# Patient Record
Sex: MALE | Race: BLACK OR AFRICAN AMERICAN | Employment: OTHER | URBAN - METROPOLITAN AREA
[De-identification: names, ages, dates, MRNs, and addresses within clinical notes are randomized per-mention and may not be internally consistent; named-entity substitution may affect disease eponyms.]

---

## 2017-06-19 PROBLEM — R10.30 LOWER ABDOMINAL PAIN: Status: ACTIVE | Noted: 2017-06-19

## 2017-06-28 PROBLEM — K76.0 FATTY LIVER: Status: ACTIVE | Noted: 2017-06-28

## 2017-08-15 PROBLEM — R11.0 NAUSEA: Status: ACTIVE | Noted: 2017-08-15

## 2017-10-23 PROBLEM — R14.0 ABDOMINAL BLOATING: Status: ACTIVE | Noted: 2017-10-23

## 2017-10-30 ENCOUNTER — HOSPITAL ENCOUNTER (OUTPATIENT)
Dept: LAB | Age: 82
Discharge: HOME | End: 2017-10-30

## 2017-10-30 PROCEDURE — 88305 TISSUE EXAM BY PATHOLOGIST: CPT | Performed by: INTERNAL MEDICINE

## 2017-10-30 PROCEDURE — 88312 SPECIAL STAINS GROUP 1: CPT | Performed by: INTERNAL MEDICINE

## 2017-11-09 ENCOUNTER — HOSPITAL ENCOUNTER (INPATIENT)
Age: 82
LOS: 13 days | Discharge: HOME | DRG: 057 | End: 2017-11-22
Attending: PHYSICAL MEDICINE & REHABILITATION | Admitting: PHYSICAL MEDICINE & REHABILITATION
Payer: MEDICARE

## 2017-11-09 DIAGNOSIS — R10.30 LOWER ABDOMINAL PAIN: ICD-10-CM

## 2017-11-09 DIAGNOSIS — M54.2 NECK PAIN: ICD-10-CM

## 2017-11-09 DIAGNOSIS — R14.0 ABDOMINAL BLOATING: ICD-10-CM

## 2017-11-09 DIAGNOSIS — I63.00 CEREBROVASCULAR ACCIDENT (CVA) DUE TO THROMBOSIS OF PRECEREBRAL ARTERY (HCC): ICD-10-CM

## 2017-11-09 DIAGNOSIS — E78.2 MIXED HYPERLIPIDEMIA: ICD-10-CM

## 2017-11-09 DIAGNOSIS — E11.9 TYPE 2 DIABETES MELLITUS WITHOUT COMPLICATION, WITHOUT LONG-TERM CURRENT USE OF INSULIN (HCC): Primary | ICD-10-CM

## 2017-11-09 DIAGNOSIS — J30.2 SEASONAL ALLERGIC RHINITIS, UNSPECIFIED CHRONICITY, UNSPECIFIED TRIGGER: ICD-10-CM

## 2017-11-09 DIAGNOSIS — I10 ESSENTIAL HYPERTENSION: ICD-10-CM

## 2017-11-09 DIAGNOSIS — R11.0 NAUSEA: ICD-10-CM

## 2017-11-09 DIAGNOSIS — K21.9 GASTROESOPHAGEAL REFLUX DISEASE, ESOPHAGITIS PRESENCE NOT SPECIFIED: ICD-10-CM

## 2017-11-09 DIAGNOSIS — J45.909 MILD ASTHMA WITHOUT COMPLICATION, UNSPECIFIED WHETHER PERSISTENT: ICD-10-CM

## 2017-11-09 DIAGNOSIS — K76.0 FATTY LIVER: ICD-10-CM

## 2017-11-09 DIAGNOSIS — F32.A DEPRESSION, UNSPECIFIED DEPRESSION TYPE: ICD-10-CM

## 2017-11-09 DIAGNOSIS — N40.1 BENIGN PROSTATIC HYPERPLASIA WITH NOCTURIA: ICD-10-CM

## 2017-11-09 DIAGNOSIS — G47.01 INSOMNIA DUE TO MEDICAL CONDITION: ICD-10-CM

## 2017-11-09 DIAGNOSIS — R35.1 BENIGN PROSTATIC HYPERPLASIA WITH NOCTURIA: ICD-10-CM

## 2017-11-09 PROBLEM — I63.9 STROKE (CEREBRUM) (HCC): Status: ACTIVE | Noted: 2017-11-09

## 2017-11-09 LAB
GLUCOSE BLD STRIP.AUTO-MCNC: 130 MG/DL (ref 65–100)
GLUCOSE BLD STRIP.AUTO-MCNC: 165 MG/DL (ref 65–100)
GLUCOSE BLD STRIP.AUTO-MCNC: 206 MG/DL (ref 65–100)

## 2017-11-09 PROCEDURE — 97116 GAIT TRAINING THERAPY: CPT

## 2017-11-09 PROCEDURE — 74011250637 HC RX REV CODE- 250/637: Performed by: PHYSICAL MEDICINE & REHABILITATION

## 2017-11-09 PROCEDURE — 65310000000 HC RM PRIVATE REHAB

## 2017-11-09 PROCEDURE — 97530 THERAPEUTIC ACTIVITIES: CPT

## 2017-11-09 PROCEDURE — 74011636637 HC RX REV CODE- 636/637: Performed by: PHYSICAL MEDICINE & REHABILITATION

## 2017-11-09 PROCEDURE — 99223 1ST HOSP IP/OBS HIGH 75: CPT | Performed by: PHYSICAL MEDICINE & REHABILITATION

## 2017-11-09 PROCEDURE — 82962 GLUCOSE BLOOD TEST: CPT

## 2017-11-09 PROCEDURE — 97162 PT EVAL MOD COMPLEX 30 MIN: CPT

## 2017-11-09 RX ORDER — TRAZODONE HYDROCHLORIDE 50 MG/1
25 TABLET ORAL
Status: DISCONTINUED | OUTPATIENT
Start: 2017-11-09 | End: 2017-11-11

## 2017-11-09 RX ORDER — AMOXICILLIN 500 MG/1
1000 CAPSULE ORAL EVERY 12 HOURS
Status: DISCONTINUED | OUTPATIENT
Start: 2017-11-09 | End: 2017-11-16

## 2017-11-09 RX ORDER — HYDRALAZINE HYDROCHLORIDE 25 MG/1
25 TABLET, FILM COATED ORAL
Status: DISCONTINUED | OUTPATIENT
Start: 2017-11-09 | End: 2017-11-22 | Stop reason: HOSPADM

## 2017-11-09 RX ORDER — ACETAMINOPHEN 325 MG/1
650 TABLET ORAL
Status: DISCONTINUED | OUTPATIENT
Start: 2017-11-09 | End: 2017-11-22 | Stop reason: HOSPADM

## 2017-11-09 RX ORDER — MAG HYDROX/ALUMINUM HYD/SIMETH 200-200-20
30 SUSPENSION, ORAL (FINAL DOSE FORM) ORAL
Status: DISCONTINUED | OUTPATIENT
Start: 2017-11-09 | End: 2017-11-22 | Stop reason: HOSPADM

## 2017-11-09 RX ORDER — PANTOPRAZOLE SODIUM 40 MG/1
40 TABLET, DELAYED RELEASE ORAL
Status: DISCONTINUED | OUTPATIENT
Start: 2017-11-09 | End: 2017-11-22 | Stop reason: HOSPADM

## 2017-11-09 RX ORDER — FACIAL-BODY WIPES
10 EACH TOPICAL DAILY PRN
Status: DISCONTINUED | OUTPATIENT
Start: 2017-11-09 | End: 2017-11-22 | Stop reason: HOSPADM

## 2017-11-09 RX ORDER — DOCUSATE SODIUM 100 MG/1
100 CAPSULE, LIQUID FILLED ORAL DAILY
Status: DISCONTINUED | OUTPATIENT
Start: 2017-11-10 | End: 2017-11-22 | Stop reason: HOSPADM

## 2017-11-09 RX ORDER — FINASTERIDE 5 MG/1
5 TABLET, FILM COATED ORAL DAILY
Status: DISCONTINUED | OUTPATIENT
Start: 2017-11-10 | End: 2017-11-22 | Stop reason: HOSPADM

## 2017-11-09 RX ORDER — LORATADINE 10 MG/1
10 TABLET ORAL EVERY EVENING
Status: DISCONTINUED | OUTPATIENT
Start: 2017-11-09 | End: 2017-11-22 | Stop reason: HOSPADM

## 2017-11-09 RX ORDER — ATORVASTATIN CALCIUM 40 MG/1
80 TABLET, FILM COATED ORAL DAILY
Status: DISCONTINUED | OUTPATIENT
Start: 2017-11-10 | End: 2017-11-22 | Stop reason: HOSPADM

## 2017-11-09 RX ORDER — INSULIN LISPRO 100 [IU]/ML
INJECTION, SOLUTION INTRAVENOUS; SUBCUTANEOUS
Status: DISCONTINUED | OUTPATIENT
Start: 2017-11-09 | End: 2017-11-22 | Stop reason: HOSPADM

## 2017-11-09 RX ORDER — GUAIFENESIN 100 MG/5ML
81 LIQUID (ML) ORAL DAILY
Status: DISCONTINUED | OUTPATIENT
Start: 2017-11-10 | End: 2017-11-22 | Stop reason: HOSPADM

## 2017-11-09 RX ORDER — ONDANSETRON 4 MG/1
4 TABLET, ORALLY DISINTEGRATING ORAL
Status: DISCONTINUED | OUTPATIENT
Start: 2017-11-09 | End: 2017-11-22 | Stop reason: HOSPADM

## 2017-11-09 RX ORDER — CLARITHROMYCIN 500 MG/1
500 TABLET, FILM COATED ORAL 2 TIMES DAILY
Status: DISCONTINUED | OUTPATIENT
Start: 2017-11-09 | End: 2017-11-16

## 2017-11-09 RX ORDER — FLUTICASONE PROPIONATE 50 MCG
2 SPRAY, SUSPENSION (ML) NASAL DAILY
Status: DISCONTINUED | OUTPATIENT
Start: 2017-11-10 | End: 2017-11-22 | Stop reason: HOSPADM

## 2017-11-09 RX ORDER — GLIPIZIDE 5 MG/1
20 TABLET ORAL
Status: DISCONTINUED | OUTPATIENT
Start: 2017-11-10 | End: 2017-11-22 | Stop reason: HOSPADM

## 2017-11-09 RX ORDER — ADHESIVE BANDAGE
30 BANDAGE TOPICAL DAILY PRN
Status: DISCONTINUED | OUTPATIENT
Start: 2017-11-09 | End: 2017-11-22 | Stop reason: HOSPADM

## 2017-11-09 RX ORDER — TAMSULOSIN HYDROCHLORIDE 0.4 MG/1
0.4 CAPSULE ORAL DAILY
Status: DISCONTINUED | OUTPATIENT
Start: 2017-11-10 | End: 2017-11-22 | Stop reason: HOSPADM

## 2017-11-09 RX ORDER — METFORMIN HYDROCHLORIDE 500 MG/1
500 TABLET ORAL
Status: DISCONTINUED | OUTPATIENT
Start: 2017-11-09 | End: 2017-11-22 | Stop reason: HOSPADM

## 2017-11-09 RX ORDER — SUCRALFATE 1 G/1
1 TABLET ORAL
Status: DISCONTINUED | OUTPATIENT
Start: 2017-11-09 | End: 2017-11-22 | Stop reason: HOSPADM

## 2017-11-09 RX ADMIN — LORATADINE 10 MG: 10 TABLET ORAL at 17:40

## 2017-11-09 RX ADMIN — HYDRALAZINE HYDROCHLORIDE 25 MG: 25 TABLET, FILM COATED ORAL at 15:43

## 2017-11-09 RX ADMIN — INSULIN LISPRO 4 UNITS: 100 INJECTION, SOLUTION INTRAVENOUS; SUBCUTANEOUS at 16:41

## 2017-11-09 RX ADMIN — PANTOPRAZOLE SODIUM 40 MG: 40 TABLET, DELAYED RELEASE ORAL at 15:46

## 2017-11-09 RX ADMIN — CLARITHROMYCIN 500 MG: 500 TABLET, FILM COATED ORAL at 17:41

## 2017-11-09 RX ADMIN — AMOXICILLIN 1000 MG: 500 CAPSULE ORAL at 20:24

## 2017-11-09 RX ADMIN — SUCRALFATE 1 G: 1 TABLET ORAL at 20:24

## 2017-11-09 RX ADMIN — METFORMIN HYDROCHLORIDE 500 MG: 500 TABLET, FILM COATED ORAL at 16:43

## 2017-11-09 RX ADMIN — INSULIN LISPRO 4 UNITS: 100 INJECTION, SOLUTION INTRAVENOUS; SUBCUTANEOUS at 21:31

## 2017-11-09 RX ADMIN — SUCRALFATE 1 G: 1 TABLET ORAL at 15:46

## 2017-11-09 NOTE — IP AVS SNAPSHOT
Gavin Oconnorshari 
 
 
 [address] [facility] [address] [facility] 
[phone] Patient: Miri Prescott MRN: [id number] EYE:[id number] About your hospitalization You were admitted on:  November 9, 2017 You last received care in the:  [facility] INPATIENT REHAB UNIT You were discharged on:  November 22, 2017 Why you were hospitalized Your primary diagnosis was:  Not on File Your diagnoses also included:  Stroke (Cerebrum) (Hcc) Things You Need To Do (next 8 weeks) [facility] with [facility] will contact you with appointment times. The appointment will probably be sometime the end of next week. Where:  [address] [facility]   (Also known [address]) [facility] 
[phone] Wednesday Dec 20, 2017 Go to Kelsey Calix MD  
Appt time-1:00pm with Nurse Practitioner Where:  [facility] [address] Thursday Dec 21, 2017 Office Visit with Rigo Leiva MD at 11:30 AM  
Where:  [facility] ([address] [place] [address]) Wednesday Jan 03, 2018 Follow Up with Darshan Butler MD at 11:00 AM  
[facility] Discharge Orders None A check deidre indicates which time of day the medication should be taken. My Medications STOP taking these medications   
 pravastatin 40 mg tablet Commonly known as:  PRAVACHOL  
   
  
  
TAKE these medications as instructed Instructions Each Dose to Equal  
 Morning Noon Evening Bedtime  
 amLODIPine 5 mg tablet Commonly known as:  Nadeen Eric Take 1 Tab by mouth daily. 5 mg  
    
  
   
   
   
  
 aspirin 81 mg chewable tablet Take 1 Tab by mouth daily. 81 mg  
    
  
   
   
   
  
 atorvastatin 80 mg tablet Commonly known as:  LIPITOR Take 1 Tab by mouth daily. 80 mg  
    
  
   
   
   
  
 escitalopram oxalate 10 mg tablet Commonly known as:  Wyn Stacie Take 1 Tab by mouth daily. 10 mg  
    
  
   
   
   
  
 finasteride 5 mg tablet Commonly known as:  PROSCAR  
   
 TAKE ONE TABLET BY MOUTH ONCE DAILY  
     
  
   
   
   
  
 glipiZIDE SR 10 mg CR tablet Commonly known as:  GLUCOTROL XL  
   
 TAKE TWO TABLETS BY MOUTH ONCE DAILY  
     
  
   
   
   
  
 glucose blood VI test strips strip Commonly known as:  FREESTYLE INSULINX Check sugars 1 time daily or as directed. lisinopril-hydroCHLOROthiazide 10-12.5 mg per tablet Commonly known as:  Floyce Plenty Take 1 Tab by mouth daily. TAKE ONE TABLET BY MOUTH ONCE DAILY 1 Tab  
    
  
   
   
   
  
 metFORMIN 500 mg tablet Commonly known as:  GLUCOPHAGE  
   
 TAKE ONE TABLET BY MOUTH THREE TIMES DAILY pantoprazole 40 mg tablet Commonly known as:  PROTONIX Take 1 Tab by mouth Before breakfast and dinner. Indications: gastroesophageal reflux disease 40 mg  
    
   
   
   
  
 sucralfate 1 gram tablet Commonly known as:  Charm Heber Take 1 Tab by mouth Before breakfast, lunch, dinner and at bedtime. 1 g  
    
  
   
   
   
  
 tamsulosin 0.4 mg capsule Commonly known as:  FLOMAX Take 2 Caps by mouth daily. 0.8 mg  
    
  
   
   
   
  
 triamcinolone 55 mcg nasal inhaler Commonly known as:  NASACORT AQ  
   
 2 Sprays daily. 2 Spray ZyrTEC 10 mg tablet Generic drug:  cetirizine Take  by mouth daily. Where to Get Your Medications These medications were sent to [address] [place] [address] Phone:  [phone]  
  amLODIPine 5 mg tablet  
 aspirin 81 mg chewable tablet atorvastatin 80 mg tablet  
 escitalopram oxalate 10 mg tablet  
 pantoprazole 40 mg tablet  
 sucralfate 1 gram tablet Discharge Instructions DISCHARGE SUMMARY from Nurse PATIENT INSTRUCTIONS: 
 
What to do at Home: 
Recommended activity: Follow activity recommendations of physical and occupational therapy. If you experience any of the following symptoms:  
· Excessive pain, swelling, redness or odor of or around the surgical area · Temperature over 100.5 · Nausea and vomiting lasting longer than 4 hours or if unable to take medications · Any signs of decreased circulation or nerve impairment to extremity: change in color, persistent  numbness, tingling, coldness or increase pain · Any questions Please follow up with your primary care provider. *  Please give a list of your current medications to your Primary Care Provider. *  Please update this list whenever your medications are discontinued, doses are 
    changed, or new medications (including over-the-counter products) are added. *  Please carry medication information at all times in case of emergency situations. These are general instructions for a healthy lifestyle: No smoking/ No tobacco products/ Avoid exposure to second hand smoke Surgeon General's Warning:  Quitting smoking now greatly reduces serious risk to your health. Obesity, smoking, and sedentary lifestyle greatly increases your risk for illness A healthy diet, regular physical exercise & weight monitoring are important for maintaining a healthy lifestyle You may be retaining fluid if you have a history of heart failure or if you experience any of the following symptoms:  Weight gain of 3 pounds or more overnight or 5 pounds in a week, increased swelling in our hands or feet or shortness of breath while lying flat in bed. Please call your doctor as soon as you notice any of these symptoms; do not wait until your next office visit. Recognize signs and symptoms of STROKE: 
 
F-face looks uneven A-arms unable to move or move unevenly S-speech slurred or non-existent T-time-call 911 as soon as signs and symptoms begin-DO NOT go Back to bed or wait to see if you get better-TIME IS BRAIN. Warning Signs of HEART ATTACK Call 911 if you have these symptoms: 
? Chest discomfort. Most heart attacks involve discomfort in the center of the chest that lasts more than a few minutes, or that goes away and comes back. It can feel like uncomfortable pressure, squeezing, fullness, or pain. ? Discomfort in other areas of the upper body. Symptoms can include pain or discomfort in one or both arms, the back, neck, jaw, or stomach. ? Shortness of breath with or without chest discomfort. ? Other signs may include breaking out in a cold sweat, nausea, or lightheadedness. Don't wait more than five minutes to call [address]! Fast action can save your life. Calling 911 is almost always the fastest way to get lifesaving treatment. Emergency Medical Services staff can begin treatment when they arrive  up to an hour sooner than if someone gets to the hospital by car. The discharge information has been reviewed with the patient. The patient verbalized understanding. Discharge medications reviewed with the patient and appropriate educational materials and side effects teaching were provided. ___________________________________________________________________________________________________________________________________ ACO Transitions of Care Introducing [employer] [address] Jasmin Brown offers a voluntary care coordination program to provide high quality service and care to [facility] fee-for-service beneficiaries. Luma Verduzco was designed to help you enhance your health and well-being through the following services: ? Transitions of Care  support for individuals who are transitioning from one care setting to another (example: Hospital to home). ? Chronic and Complex Care Coordination  support for individuals and caregivers of those with serious or chronic illnesses or with more than one chronic (ongoing) condition and those who take a number of different medications. If you meet specific medical criteria, a [facility] [address] may call you directly to coordinate your care with your primary care physician and your other care providers. For questions about the [facility] programs, please, contact your physicians office. For general questions or additional information about Accountable Care Organizations: 
Please visit www.medicare.gov/acos. html or call 1-800-MEDICARE (1-270.922.4038) TTY users should call 1-372.181.4898. at[employer]enu Announcement We are excited to announce that we are making your provider's discharge notes available to you in atVenu. You will see these notes when they are completed and signed by the physician that discharged you from your recent hospital stay. If you have any questions or concerns about any information you see in atVenu, please call the Health Information Department where you were seen or reach out to your Primary Care Provider for more information about your plan of care. Introducing [facility]! Arianna Jimenez introduces at[employer]enu patient portal. Now you can access parts of your medical record, email your doctor's office, and request medication refills online. 1. In your internet browser, go to [email or web address] 2. Click on the First Time User? Click Here link in the Sign In box. You will see the New Member Sign Up page. 3. Enter your at[employer] Access Code exactly as it appears below. You will not need to use this code after youve completed the sign-up process.  If you do not sign up before the expiration date, you must request a new code. · [employer] Access Code: [email or web address] Expires: 12/25/2017 10:45 AM 
 
4. Enter the last four digits of your Social Security Number (xxxx) and Date of Birth (mm/dd/yyyy) as indicated and click Submit. You will be taken to the next sign-up page. 5. Create a [employer] ID. This will be your [employer] login ID and cannot be changed, so think of one that is secure and easy to remember. 6. Create a [employer] password. You can change your password at any time. 7. Enter your Password Reset Question and Answer. This can be used at a later time if you forget your password. 8. Enter your e-mail address. You will receive e-mail notification when new information is available in [address] 9. Click Sign Up. You can now view and download portions of your medical record. 10. Click the Download Summary menu link to download a portable copy of your medical information. If you have questions, please visit the Frequently Asked Questions section of the [employer] website. Remember, [employer] is NOT to be used for urgent needs. For medical emergencies, dial 911. Now available from your iPhone and Android! Providers Seen During Your Hospitalization Provider Specialty Primary office phone [address] [facility] MD [facility] [phone] Your Primary Care Physician (PCP) Primary Care Physician Office Phone Office Fax [address] [place] [address], [address] [phone] You are allergic to the following Allergen Reactions Hay Fever And Allergy Relief Runny Nose Recent Documentation Height Weight BMI Smoking Status 1.88 m 89.4 kg 25.29 kg/m2 Former Smoker Emergency Contacts Name Discharge Info Relation Home Work Mobile Charity Zuleta  Spouse [4] [phone] Patient Belongings The following personal items are in your possession at time of discharge: Dental Appliances: None  Visual Aid: Glasses      Home Medications: None   Jewelry: Ring, Watch  Clothing: Footwear, Pants, Socks, Slippers, Shirt, Undergarments, With patient    Other Valuables: Cell Phone  Personal Items Sent to Safe: no 
 
  
  
 Please provide this summary of care documentation to your next provider. Signatures-by signing, you are acknowledging that this After Visit Summary has been reviewed with you and you have received a copy. Patient Signature:  ____________________________________________________________ Date:  ____________________________________________________________  
  
Juliann Herndon Provider Signature:  ____________________________________________________________ Date:  ____________________________________________________________

## 2017-11-09 NOTE — PROGRESS NOTES
TRANSFER - IN REPORT:  Della Rosa RN     received verbal report from Martir Fontaine RN on Vallie Medin  being received from Vladislav White (unit) for routine progression of care      Report consisted of patients Situation, Background, Assessment and   Recommendations(SBAR). Information from the following report(s) SBAR was reviewed with the receiving nurse. Opportunity for questions and clarification was provided. Assessment completed upon patients arrival to unit and care assumed.

## 2017-11-09 NOTE — H&P
[address], [address] [facility]  Tel: [phone]  Fax: [phone]      HISTORY AND PHYSICAL  IRC       Admit Date: 11/9/2017    Primary Care Physician: Fatou Brown MD  Specialty Group: Dr Caitlin Baxter  Neurology; Dr. Faith Negro    Chief Complaint : Gait dysfunction secondary to below. Admit Diagnosis: Left CVA; Stroke (cerebrum) (HCC)  Acute Rehab Dx:  Gait impairment/ gait dysfunction  Debility    deconditioning  Mobility and ambulation deficits  Self Care/ADL deficits    Medical Dx:  Past Medical History:   Diagnosis Date    Abnormal weight gain     Acute bronchitis     Acute sinusitis, unspecified     Allergic rhinitis, cause unspecified 2/25/2015    Backache, unspecified 2/25/2015    Bladder neck obstruction     Carpal tunnel syndrome     Conjunctivitis unspecified     Cough     Delusional disorder(297.1) 2/25/2015    Dermatophytosis of nail     Dizziness and giddiness     Dyspepsia and other specified disorders of function of stomach     Hemorrhage of rectum and anus     Hypertrophy of prostate with urinary obstruction and other lower urinary tract symptoms (LUTS) 2/25/2015    Impotence of organic origin     Lipoma of unspecified site     Nausea alone     Nocturia     Nonspecific abnormal finding in stool contents     Other and unspecified hyperlipidemia 2/25/2015    Other malaise and fatigue     Other persistent mental disorders due to conditions classified elsewhere     Pain in joint, lower leg     Rash and other nonspecific skin eruption     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 or other cause, without mention of thyrotoxic crisis or storm     Type II or unspecified type diabetes mellitus without mention of complication, not stated as uncontrolled 2/25/2015    Unspecified asthma(493.90) 2/25/2015    Unspecified essential hypertension 2/25/2015    Unspecified hereditary and idiopathic peripheral neuropathy     Unspecified paranoid state     Unspecified vertiginous syndromes and labyrinthine disorders     Urinary frequency     Urinary tract infection, site not specified 2/25/2015       Date of Evaluation:  November 9, 2017    HPI: Jeannie Sy is a 81 y.o. male patient at [address] [place] [address] who was admitted on 11/9/2017  by Karena Munroe MD with below mentioned medical history ,is being seen for Physical Medicine and Rehabilitation. Mr. Ana Luisa Courtney is an extremely pleasant right handed 82yo AA male with htn, peripheral neuropathy, vertigo, Type 2 DM, thyroid disease and asthma, recent dx of H pylori who was admitted to [facility] on 11/6 with right sided weakness and slurred speech. One week prior to presentation he had an EGD and was placed on carafate and prilosec. He noticed the weakness of 11/1 but though it was maybe a side effect of the new medications. The following day he was dragging his right foot more and had slurring speech. He started using a walker at home due to RLE weakness. He finally decided to present to the ED on 11/6 due to the fact that the symptoms had not improved. Head CT was negative. He received notice that day from his GIs office regarding H pylori and was started on amoxil and biaxin for 14d. MRI confirmed an acute left pontine ischemic stroke. He was placed on Lipitor and ASA. Carotid duplex revealed elevated flow velocities in the common carotid arteries suggesting proximal stenosis, thus a CT angio of the carotids was performed, revealing moderate atherosclerotic calcification involving thoracic aorta with ectasia of the descending aorta. ( this will require monitoring by PCP as an outpt.) There was no appreciable stenosis of the subclavian or common carotid arteries. There was no appreciable stenosis in either carotid artery.  I do not have the ECHO report documented in the records from [facility]. His BP has been poorly controlled and his bs have varied. He is tolerating a NDD3 diet with thin liquids. He is CGA to min A with mobility and ADLs. Physical therapy was initiated and patient was starting to mobilize. However, he shows significant functional deficits due to prolonged immobility and hospitalization. Our service was consulted for rehab needs and we recommended inpatient hospital rehabilitation is reasonable and necessary due to ongoing acute medical issues which have not changed since initial pre-admission evaluation. and rehab needs still likely best managed in IRU setting. The patient was evaluated by [facility] admissions coordinators. I reviewed the preadmission screening and have approved for admission to the [facility]. The patient was cleared for transfer to rehab today. Patient continues to have ongoing  medical issues outlined above requiring physician medical supervision and functional deficits which would benefit from continued intensive therapies. Current Level of Function: (evaluated by acute therapy staff, with bed mobility, transfers, balance personally observed post-admission in the IRF setting minutes prior to submission of document) CGA to min A with mobility and self care    Prior Level of Function/Home Situation:    , lives with spouse in a one story home with 1 step to enter. Patient normally ambulated at an independent level, he is normally ind with ADLs. He enjoys working on the lawn. Will return home with supportive wife. He has 2 sons locally?     Past Medical History:   Diagnosis Date    Abnormal weight gain     Acute bronchitis     Acute sinusitis, unspecified     Allergic rhinitis, cause unspecified 2/25/2015    Backache, unspecified 2/25/2015    Bladder neck obstruction     Carpal tunnel syndrome     Conjunctivitis unspecified     Cough     Delusional disorder(297.1) 2/25/2015    Dermatophytosis of nail     Dizziness and giddiness     Dyspepsia and other specified disorders of function of stomach     Hemorrhage of rectum and anus     Hypertrophy of prostate with urinary obstruction and other lower urinary tract symptoms (LUTS) 2/25/2015    Impotence of organic origin     Lipoma of unspecified site     Nausea alone     Nocturia     Nonspecific abnormal finding in stool contents     Other and unspecified hyperlipidemia 2/25/2015    Other malaise and fatigue     Other persistent mental disorders due to conditions classified elsewhere     Pain in joint, lower leg     Rash and other nonspecific skin eruption     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 or other cause, without mention of thyrotoxic crisis or storm     Type II or unspecified type diabetes mellitus without mention of complication, not stated as uncontrolled 2/25/2015    Unspecified asthma(493.90) 2/25/2015    Unspecified essential hypertension 2/25/2015    Unspecified hereditary and idiopathic peripheral neuropathy     Unspecified paranoid state     Unspecified vertiginous syndromes and labyrinthine disorders     Urinary frequency     Urinary tract infection, site not specified 2/25/2015      Past Surgical History:   Procedure Laterality Date    HX HEMORRHOIDECTOMY      HX OTHER SURGICAL      Keloid Surgery     HX OTHER SURGICAL      Hydrocele     Allergies   Allergen Reactions    Hay Fever And Allergy Relief Runny Nose      Family History   Problem Relation Age of Onset    Diabetes Mother      DM    Heart Disease Father      CHF    Heart Disease Sister      heart trouble of some sort    Cancer Sister      BREAST CANCER      Social History   Substance Use Topics    Smoking status: Former Smoker    Smokeless tobacco: Never Used    Alcohol use No      Comment: rare      Current Facility-Administered Medications   Medication Dose Route Frequency    alum-mag hydroxide-simeth (MYLANTA) oral suspension 30 mL  30 mL Oral Q4H PRN    magnesium hydroxide (MILK OF MAGNESIA) 400 mg/5 mL oral suspension 30 mL 30 mL Oral DAILY PRN    acetaminophen (TYLENOL) tablet 650 mg  650 mg Oral Q6H PRN    traZODone (DESYREL) tablet 25 mg  25 mg Oral QHS PRN    [START ON 11/10/2017] aspirin chewable tablet 81 mg  81 mg Oral DAILY    hydrALAZINE (APRESOLINE) tablet 25 mg  25 mg Oral QID PRN    [START ON 11/10/2017] docusate sodium (COLACE) capsule 100 mg  100 mg Oral DAILY    bisacodyl (DULCOLAX) suppository 10 mg  10 mg Rectal DAILY PRN    pantoprazole (PROTONIX) tablet 40 mg  40 mg Oral ACB&D    [START ON 11/10/2017] atorvastatin (LIPITOR) tablet 80 mg  80 mg Oral DAILY    amoxicillin (AMOXIL) capsule 1,000 mg  1,000 mg Oral Q12H    clarithromycin (BIAXIN) tablet 500 mg  500 mg Oral BID    [START ON 11/10/2017] finasteride (PROSCAR) tablet 5 mg  5 mg Oral DAILY    [START ON 11/10/2017] glipiZIDE (GLUCOTROL) tablet 20 mg  20 mg Oral ACB    [START ON 11/10/2017] benazepril/hydroCHLOROthiazide (LOTENSIN HCT) 10/12.5 mg   Oral DAILY    [START ON 11/10/2017] fluticasone (FLONASE) 50 mcg/actuation nasal spray 2 Spray  2 Spray Both Nostrils DAILY    loratadine (CLARITIN) tablet 10 mg  10 mg Oral QPM    sucralfate (CARAFATE) tablet 1 g  1 g Oral AC&HS    [START ON 11/10/2017] tamsulosin (FLOMAX) capsule 0.4 mg  0.4 mg Oral DAILY    metFORMIN (GLUCOPHAGE) tablet 500 mg  500 mg Oral TID WITH MEALS       Review of Systems:  Denies: fevers, chills, sweats, fatigue, malaise, anorexia, weight loss   Denies: blurry vision, loss of vision, eye pain, photophobia ; positive; blurred vision due to cataracts  Denies: ringing in the ears, earache, epistaxis; positive; [place]  Denies: chest pain, palpitations, syncope, orthopnea, paroxysmal nocturnal dyspnea, claudication   Denies: dysphagia, odynophagia, nausea, vomiting, diarrhea, constipation, abdominal pain, jaundice, melena Positive ; GERD and increased gas and feeling of fullness  Denies: frequency, dysuria,  urinary incontinence, stones, hematuria ; Positive; nocturia, BPH  Denies: polydipsia/polyuria, skin changes, temperature intolerance, unexpected weight gain   Denies: back pain, joint pain, joint swelling, muscle pain, Positive; muscle weakness   Denies: bleeding problems, blood transfusions, bruising, pallor, swollen lymph nodes   Denies: headache, diplopia,seizure Positive; right sided weakness, dysarthria, blurred vision      Objective:     Visit Vitals    /82    Pulse 76    Temp 97.8 °F (36.6 °C)    Resp 18    SpO2 96%      Physical Exam:   Psych: Patient was oriented to person, place, and time. Without hallucinations, abnormal affect or abnormal behaviors during the examination. (hx of delusions; not noted)   General:   Alert, appears a bit younger than stated age, No acute distress. WDWN AA male   HEENT:  Normocephalic, EOMI, right lower facial weaknes. Oral mucosa pink and moist. No nasal discharge. Lungs:  CTA Bilaterally,Respiration even and unlabored   No apparent use of accessory muscles for respiration. No nasal alar flaring. Heart:  Regular rate and rhythm, S1, S2, No obvious murmur, click, rub or gallop. Genitourinary: defered   Abdomen:  Soft, non-tender to palpation in all four quadrants. Bowel sounds present. No obvious masses palpated. Neuromuscular:  cataracts, EOMI  LUE     Shoulder abduction   4+/5              Elbow flexion:  5-/5               Wrist extension:   4/5              Finger flexion;   4/5   ADMQ:   5-/5  RUE    Shoulder abduction:  4/5                Elbow flexion:  4+ /5    Wrist extension:  3+/5   Finger flexion:   3+/5   ADMQ:  3 /5  LLE     Hip flexion:   4+/5              Knee extension:   5/5    Ankle dorsiflexion:   5/5   Ankle plantarflexion:   5/5        RLE     Hip flexion:   4/5   Knee extension:   4+/5    Ankle dorsiflexion:   5-/5   Ankle plantarflexion:   5/5  Sensory - intact  Plantars - down going  DTR 1+, toes down  Speech dysarthric, word finding deficits.  Oriented, fair wealth of knowledge x did initially tell me he only had one son, but soon self corrected and said 2. Fair historian. Recall 1/3 with vc     Skin:  Intact, dry, good skin turgor, age related changes present   Edema: none            Lab Review:    Recent Results (from the past 72 hour(s))   GLUCOSE, POC    Collection Time: 11/09/17 11:28 AM   Result Value Ref Range    Glucose (POC) 165 (H) 65 - 100 mg/dL     Condition on Admission: Good      Assessment: Left pontine CVA    The Post Assessment Physician Evaluation (ROWAN) found the current functional status to be comparable with the Pre-admission Screening. The Patient is a good candidate for acute inpatient rehabilitation. Nothing since the Pre-admission screen has changed that determination. Rehabilitation Plan  The patient has shown the ability to tolerate and benefit from 3 hours of therapy daily and is being admitted to a comprehensive acute inpatient rehabilitation program consisting of at least 3 hours of combined physical and occupational therapies. Begin intensive Physical Therapy for a minimum of 1.5 hours a day, at least 5 out of 7 days per week to address bed mobility, transfers, ambulation, strengthening, balance, and endurance. Begin intensive Occupational Therapy for a minimum of 1.5 hours a day, at least 5 out of 7 days per week to address ADL ( bathing, LE dressing, toileting) and adaptive equipment as needed. The patient will also require 24-hour skilled rehabilitation nursing for bowel and bladder management, skin care for decubitus ulcer prevention , pain management and ongoing medication administration    Continue ST for: dysarthria, impaired communication skills, vital stimulation for R. facial weakness.     Continue daily physician medical management:  Pneumonia prophylaxis- Incentive spirometer every hour while awake    Ischemic CVA; cont asa and lipitor; permissive htn    DVT risk / DVT Prophylaxis- Will require daily physician exam to assess for signs and symptoms as patient is at increased risk for of thromboembolism. Mobilization as tolerated. Intermittent pneumatic compression devices when in bed Thigh-high or knee-high thromboembolic deterrent hose when out of bed. Add subcut heparin     Pain Control: no current joint or muscle symptoms, essentially pain-free. Will require regular pain assessment and comprenhensive pain management,     Hypertension - BP uncontrolled, fluctuating, managed medically. Continue Lotensin and add prn hydralazine. His SBP upon arrival was >220 and DBP >110   monitor closely, consider adding metoprolol; want permissive htn for cerebral perfusion but goal 120-140s    Diabetes mellitus - Uncontrolled. poor glycemic control. Will require daily, close FSG monitoring and medication adjustment to optimize glycemic control in setting of acute illness and hospitalization. Cont metformin and glucotrol 20mg. Add SSI. Cont carb restricted diet, accuchecks qac and qhs    Urinary retention/ neurogenic bladder - schedule voids q6-8 hrs. Check post-void residual as needed; In and Out catheterize if post-void residual is more than 400 cc. Pt with hx of prostate issues . Cont flomax and proscar    bowel program - add colace and prn bowel program    GERD - resume PPI. At times may need additional antacids, Maalox prn. On omeprazole which is nonformulary, thus change to protonix 40mg bid.  -H pylori; cont Biaxin and amoxil and carafate. Will treat with dual abx for 2 weeks. Will f/u with GI post dc    Asthma/allergic rhinitis; cont flonase and zyrtec. The patient's prognosis for significant practical improvement within a reasonable period of time appears good and the estimated length of stay is 14-20 days and he is expected to return home with spouse and continued rehabilitation with home health therapy.      Given the patient's complex neurologic/medical condition and the risk of further medical complications including: DVT, PE, skin breakdown, pneumonia due to decreased mobility,   recurrent CVA, MI, cardiac arrhythmias due to HTN, increased risk of thromboembolism secondary to decreased blood flow due to immobility and hemiparesis  could potentially impact the IRF program with decreased cardiovascular efficiency, postural hypotension and cardiac arrhythmia. For these ongoing medical issues, rehabilitation services could not be safely provided at a lower level of care such as a skilled nursing facility or nursing home.     70 min    Signed By: Brittani Knapp MD     November 9, 2017

## 2017-11-09 NOTE — PROGRESS NOTES
End Of Shift Functional Summary, Nursing      TOILETING:  Does patient need assist with clothing management and/or pericare? Yes: Comment: SBA    TOILET TRANSFER:  Pt requires minimal assistance. Pt uses walker. BLADDER:  Pt does not have a plunkett catheter that staff manages. Pt does not take medication. Pt is continent. of bladder and voids in toilet  Pt has had 0 bladder accidents . (An accident is when the episode is not contained in a brief AND/OR the clothing/linen requires changing/cleaning up.)    BOWEL:  Pt does take medication. Pt is continent of bowel and uses toilet. Pt has had 0 bowel accidents during this shift  (An accident is when the episode is not contained in a brief AND/OR the clothing/linen requires changing/cleaning up.)    BED/CHAIR TRANSFER  Pt requires standby assistance/setup. Patient requires the assistance of 1 staff member(s). Pt uses walker        EATING  Pt requires setup. Pt does not wear dentures. TUBE FEEDINGS:  Pt does not  receive nutrition through tube feedings. Patient requires no assistance with feedings. Documentation reviewed and plan of care discussed/reviewed with   patient during the shift.

## 2017-11-09 NOTE — PROGRESS NOTES
PHYSICAL THERAPY EXAMINATION    Time in: [address]  Time out: 7[address] [facility]    Patient Name: Fiona Dinero  Patient Age: 81 y.o.   Past Medical History:   Past Medical History:   Diagnosis Date    Abnormal weight gain     Acute bronchitis     Acute sinusitis, unspecified     Allergic rhinitis, cause unspecified 2/25/2015    Backache, unspecified 2/25/2015    Bladder neck obstruction     Carpal tunnel syndrome     Conjunctivitis unspecified     Cough     Delusional disorder(297.1) 2/25/2015    Dermatophytosis of nail     Dizziness and giddiness     Dyspepsia and other specified disorders of function of stomach     Hemorrhage of rectum and anus     Hypertrophy of prostate with urinary obstruction and other lower urinary tract symptoms (LUTS) 2/25/2015    Impotence of organic origin     Lipoma of unspecified site     Nausea alone     Nocturia     Nonspecific abnormal finding in stool contents     Other and unspecified hyperlipidemia 2/25/2015    Other malaise and fatigue     Other persistent mental disorders due to conditions classified elsewhere     Pain in joint, lower leg     Rash and other nonspecific skin eruption     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 or other cause, without mention of thyrotoxic crisis or storm     Type II or unspecified type diabetes mellitus without mention of complication, not stated as uncontrolled 2/25/2015    Unspecified asthma(493.90) 2/25/2015    Unspecified essential hypertension 2/25/2015    Unspecified hereditary and idiopathic peripheral neuropathy     Unspecified paranoid state     Unspecified vertiginous syndromes and labyrinthine disorders     Urinary frequency     Urinary tract infection, site not specified 2/25/2015       Medical Diagnosis:  Left CVA  Stroke (cerebrum) (HCC) <principal problem not specified>    Precautions at Admission: Other (comment) (Fall risk)    Therapy Diagnosis:   Difficulty with bed mobility  []     Difficulty with functional transfers  [x]     Difficulty with ambulation  [x]     Difficulty with stair negotiations  [x]       Problem List:    Decreased strength B LE  [x]     Decreased strength trunk/core  [x]     Decreased AROM   []     Decreased PROM  []    Decreased endurance  [x]     Decreased balance sitting  []     Decreased balance standing  [x]     Pain   []     Slow ambulation velocity  [x]    Decreased coordination  [x]    Decreased safety awareness  [x]      Functional Limitations:   Decreased independence with bed mobility  [x]     Decreased independence with functional transfers  [x]     Decreased independence with ambulation  [x]     Decreased independence with stair negotiation  [x]       Previous Functional Level: Patient ambulated without use of AD, operated motor vehicle, performed all self-care tasks independently    Home Environment: Home Environment: Private residence  # Steps to Enter: 1  One/Two Story Residence: One story  Living Alone: No  Support Systems: Child(jagdeep), Spouse/Significant Other/Partner  Patient Expects to be Discharged to[de-identified] Private residence  Current DME Used/Available at Home: Grab bars, Raised toilet seat, Cane, straight, Walker         Outcome Measures: Vital Signs:   Patient Vitals for the past 8 hrs:   Temp Pulse Resp BP SpO2   11/09/17 1543 - 69 - (!) 174/113 -   11/09/17 1513 97.9 °F (36.6 °C) 69 18 (!) 174/113 96 %   11/09/17 1130 97.8 °F (36.6 °C) 76 18 168/82 96 %           Pain level: No pain reported. Pain location: n/a  Pain interventions: n/a    Patient education: Oriented patient to [facility] and daily schedule.     Interdisciplinary Communication: Communicated with Norm Klinefelter, RN regarding patient's        MMT Initial Asssessment   Right Lower Extremity Left Lower Extremity   Hip Flexion 4- 4+   Knee Extension 4 4+   Knee Flexion 4- 4+   Ankle Dorsiflexion 4- 4+   0/5 No palpable muscle contraction  1/5 Palpable muscle contraction, no joint movement  2-/5 Less than full range of motion in gravity eliminated position  2/5 Able to complete full range of motion in gravity eliminated position  2+/5 Able to initiate movement against gravity  3-/5 More than half but not full range of motion against gravity  3/5 Able to complete full range of motion against gravity  3+/5 Completes full range of motion against gravity with minimal resistance  4-/5 Completes full range of motion against gravity with minimal-moderate resistance  4/5 Completes full range of motion against gravity with moderate resistance  4+/5 Completes full range of motion against gravity with moderate-maximum resistance  5/5 Completes full range of motion against gravity with maximum resistance     AROM: Generally decreased R LE, functional    FIM SCORES Initial Assessment   Bed/Chair/Wheelchair Transfers 4   Wheelchair Mobility 5   Walking [place] 1   Steps/Stairs 0 (Secondary to decreased dynamic standing balance.)   PRIMARY MODE OF LOCOMOTION: Ambulation  Please see C Interdisciplinary Eval: Coordination/Balance Section for details regarding FIM score description.     BED/CHAIR/WHEELCHAIR TRANSFERS Initial Assessment   Rolling Right 0 (Not tested)   Rolling Left 0 (Not tested)   Supine to Sit 0 (Not tested)   Sit to Stand Minimal assistance   Sit to Supine 0 (Not tested)   Transfer Assist Score 4   Transfer Type SPT with walker   Comments Lifting and steadying assist.   Car Transfer Not tested   Car Type         WHEELCHAIR MOBILITY/MANAGEMENT Initial Assessment   Able to Propel 165 feet   Functional Level 5   Curbs/ramps assistance required 0 (Not tested)   Wheelchair set up assistance required 0 (Not tested)   Wheelchair management         WALKING INDEPENDENCE Initial Assessment   Assistive device     Ambulation assistance - level surface 3 (Moderate assistance)   Distance 20 Feet (ft)   Functional Level 1   Comments Patient ambulated with decreased step length B LE, increased postural sway, altered center of gravity   Ambulation assistance - unlevel surface 0 (Not tested)     Patient ambulated with RW 65 ft with MIN A with decreased step clearance R LE, decreased step length B LE and mild forward head posture. STEPS/STAIRS Initial Assessment   Steps/Stairs ambulated 0   Rail Use     Functional Level 0 (Secondary to decreased dynamic standing balance.)   Comments     Curbs/Ramps         QUALITY INDICATOR ASSIST COMMENTS   Walk 10 feet MOD A    Walk 50 feet with 2 turns NT    Walk 150 feet NT    Walk 10 feet on uneven  NT    1 step/curb NT    4 steps NT    12 steps NT     object NT    Wheel 50' w/2 turns NT    Wheel 150' NT      Patient returned to room sitting in recliner with all needs in reach. PHYSICAL THERAPY PLAN OF CARE    Therapy Diagnosis:   Please see table above    Order received from MD for physical therapy services and chart reviewed. Pt to be seen at least 5 times per week for at least 1.5 hours of physical therapy per day for 10 days. Thank you for the referral.    LTGs:  LTG 1. Patient transfer supine<>sit with MOD I in 10 days  LTG 2. Patient transfer sit<>stand and perform stand pivot transfer with RW and modified independence in 10 days  LTG 3. Patient ambulate 200 feet with RW and MOD I in 10 days  LTG 4. Patient ambulate up/down 12 steps with handrails and MOD I in 10 days  LTG 5. Patient perform HEP with MOD I in 10 days. Pt would benefit from skilled physical therapy in order to improve independent functional mobility within the home. Interventions may include range of motion (AROM, PROM B LE/trunk), motor function (B LE/trunk strengthening/coordination), activity tolerance (vitals, oxygen saturation levels), bed mobility training, balance activities, gait training (progressive ambulation program), and functional transfer training. Please see IRC; Interdisciplinary Eval, Care Plan, and Patient Education for further information regarding physical therapy examination and plan of care.      Kasandra Salvador Dorothy Butts  11/9/2017

## 2017-11-09 NOTE — PROGRESS NOTES
Patient received to room 916, pt informed of safety precautions and nurse call light. Pt voiced understanding and demonstrated how to use nurse call light. Pt is a Left CVA with RT side weakness. Pt ambulates with 1 person assist. Dual Skin assessment performed by Sadi Colon and Mami MCNAIR. Pt skin is intact, no breakdown seen on patient skin RT inner  Arm/wrist has old scaring where pt had a tattoo removed. Pt is A/O x 4,, Pt denies pain, denies shortness of breath and is on RA. Pt encourage to call for any needs. Call light with  Reach. Family at bedside.

## 2017-11-09 NOTE — PROGRESS NOTES
Pt sitting in recliner, voices no needs, takes medication whole with sips of water. Pt encourage to call for any needs. Call light with in reach.

## 2017-11-10 LAB
ALBUMIN SERPL-MCNC: 3.5 G/DL (ref 3.2–4.6)
ALBUMIN/GLOB SERPL: 0.9 {RATIO} (ref 1.2–3.5)
ALP SERPL-CCNC: 65 U/L (ref 50–136)
ALT SERPL-CCNC: 25 U/L (ref 12–65)
ANION GAP SERPL CALC-SCNC: 8 MMOL/L (ref 7–16)
AST SERPL-CCNC: 13 U/L (ref 15–37)
BILIRUB SERPL-MCNC: 0.4 MG/DL (ref 0.2–1.1)
BUN SERPL-MCNC: 12 MG/DL (ref 8–23)
CALCIUM SERPL-MCNC: 8.8 MG/DL (ref 8.3–10.4)
CHLORIDE SERPL-SCNC: 102 MMOL/L (ref 98–107)
CO2 SERPL-SCNC: 30 MMOL/L (ref 21–32)
CREAT SERPL-MCNC: 1.17 MG/DL (ref 0.8–1.5)
ERYTHROCYTE [DISTWIDTH] IN BLOOD BY AUTOMATED COUNT: 12.9 % (ref 11.9–14.6)
GLOBULIN SER CALC-MCNC: 3.9 G/DL (ref 2.3–3.5)
GLUCOSE BLD STRIP.AUTO-MCNC: 108 MG/DL (ref 65–100)
GLUCOSE BLD STRIP.AUTO-MCNC: 138 MG/DL (ref 65–100)
GLUCOSE BLD STRIP.AUTO-MCNC: 140 MG/DL (ref 65–100)
GLUCOSE BLD STRIP.AUTO-MCNC: 179 MG/DL (ref 65–100)
GLUCOSE BLD STRIP.AUTO-MCNC: 194 MG/DL (ref 65–100)
GLUCOSE BLD STRIP.AUTO-MCNC: 50 MG/DL (ref 65–100)
GLUCOSE BLD STRIP.AUTO-MCNC: 78 MG/DL (ref 65–100)
GLUCOSE SERPL-MCNC: 166 MG/DL (ref 65–100)
HCT VFR BLD AUTO: 41 % (ref 41.1–50.3)
HGB BLD-MCNC: 14.2 G/DL (ref 13.6–17.2)
MAGNESIUM SERPL-MCNC: 2.4 MG/DL (ref 1.8–2.4)
MCH RBC QN AUTO: 26.8 PG (ref 26.1–32.9)
MCHC RBC AUTO-ENTMCNC: 34.6 G/DL (ref 31.4–35)
MCV RBC AUTO: 77.4 FL (ref 79.6–97.8)
PLATELET # BLD AUTO: 238 K/UL (ref 150–450)
PMV BLD AUTO: 10.1 FL (ref 10.8–14.1)
POTASSIUM SERPL-SCNC: 3.7 MMOL/L (ref 3.5–5.1)
PROT SERPL-MCNC: 7.4 G/DL (ref 6.3–8.2)
RBC # BLD AUTO: 5.3 M/UL (ref 4.23–5.67)
SODIUM SERPL-SCNC: 140 MMOL/L (ref 136–145)
WBC # BLD AUTO: 7.1 K/UL (ref 4.3–11.1)

## 2017-11-10 PROCEDURE — 99232 SBSQ HOSP IP/OBS MODERATE 35: CPT | Performed by: PHYSICAL MEDICINE & REHABILITATION

## 2017-11-10 PROCEDURE — 36415 COLL VENOUS BLD VENIPUNCTURE: CPT | Performed by: PHYSICAL MEDICINE & REHABILITATION

## 2017-11-10 PROCEDURE — 97530 THERAPEUTIC ACTIVITIES: CPT

## 2017-11-10 PROCEDURE — 74011250637 HC RX REV CODE- 250/637: Performed by: PHYSICAL MEDICINE & REHABILITATION

## 2017-11-10 PROCEDURE — 96125 COGNITIVE TEST BY HC PRO: CPT

## 2017-11-10 PROCEDURE — 74011636637 HC RX REV CODE- 636/637: Performed by: PHYSICAL MEDICINE & REHABILITATION

## 2017-11-10 PROCEDURE — 97116 GAIT TRAINING THERAPY: CPT

## 2017-11-10 PROCEDURE — 97535 SELF CARE MNGMENT TRAINING: CPT

## 2017-11-10 PROCEDURE — 97165 OT EVAL LOW COMPLEX 30 MIN: CPT

## 2017-11-10 PROCEDURE — 85027 COMPLETE CBC AUTOMATED: CPT | Performed by: PHYSICAL MEDICINE & REHABILITATION

## 2017-11-10 PROCEDURE — 80053 COMPREHEN METABOLIC PANEL: CPT | Performed by: PHYSICAL MEDICINE & REHABILITATION

## 2017-11-10 PROCEDURE — 65310000000 HC RM PRIVATE REHAB

## 2017-11-10 PROCEDURE — 82962 GLUCOSE BLOOD TEST: CPT

## 2017-11-10 PROCEDURE — 92610 EVALUATE SWALLOWING FUNCTION: CPT

## 2017-11-10 PROCEDURE — 83735 ASSAY OF MAGNESIUM: CPT | Performed by: PHYSICAL MEDICINE & REHABILITATION

## 2017-11-10 PROCEDURE — 97110 THERAPEUTIC EXERCISES: CPT

## 2017-11-10 RX ORDER — AMLODIPINE BESYLATE 5 MG/1
5 TABLET ORAL DAILY
Status: DISCONTINUED | OUTPATIENT
Start: 2017-11-10 | End: 2017-11-22 | Stop reason: HOSPADM

## 2017-11-10 RX ADMIN — AMOXICILLIN 1000 MG: 500 CAPSULE ORAL at 21:04

## 2017-11-10 RX ADMIN — SUCRALFATE 1 G: 1 TABLET ORAL at 06:08

## 2017-11-10 RX ADMIN — FLUTICASONE PROPIONATE 2 SPRAY: 50 SPRAY, METERED NASAL at 08:53

## 2017-11-10 RX ADMIN — SUCRALFATE 1 G: 1 TABLET ORAL at 16:58

## 2017-11-10 RX ADMIN — AMLODIPINE BESYLATE 5 MG: 5 TABLET ORAL at 12:03

## 2017-11-10 RX ADMIN — ASPIRIN 81 MG CHEWABLE TABLET 81 MG: 81 TABLET CHEWABLE at 08:49

## 2017-11-10 RX ADMIN — SUCRALFATE 1 G: 1 TABLET ORAL at 21:05

## 2017-11-10 RX ADMIN — TRAZODONE HYDROCHLORIDE 25 MG: 50 TABLET ORAL at 23:12

## 2017-11-10 RX ADMIN — CLARITHROMYCIN 500 MG: 500 TABLET, FILM COATED ORAL at 08:50

## 2017-11-10 RX ADMIN — PANTOPRAZOLE SODIUM 40 MG: 40 TABLET, DELAYED RELEASE ORAL at 16:58

## 2017-11-10 RX ADMIN — METFORMIN HYDROCHLORIDE 500 MG: 500 TABLET, FILM COATED ORAL at 16:55

## 2017-11-10 RX ADMIN — GLIPIZIDE 20 MG: 5 TABLET ORAL at 07:53

## 2017-11-10 RX ADMIN — DOCUSATE SODIUM 100 MG: 100 CAPSULE, LIQUID FILLED ORAL at 08:50

## 2017-11-10 RX ADMIN — ATORVASTATIN CALCIUM 80 MG: 40 TABLET, FILM COATED ORAL at 08:50

## 2017-11-10 RX ADMIN — METFORMIN HYDROCHLORIDE 500 MG: 500 TABLET, FILM COATED ORAL at 07:54

## 2017-11-10 RX ADMIN — INSULIN LISPRO 2 UNITS: 100 INJECTION, SOLUTION INTRAVENOUS; SUBCUTANEOUS at 21:20

## 2017-11-10 RX ADMIN — LORATADINE 10 MG: 10 TABLET ORAL at 17:44

## 2017-11-10 RX ADMIN — FINASTERIDE 5 MG: 5 TABLET, FILM COATED ORAL at 08:50

## 2017-11-10 RX ADMIN — TAMSULOSIN HYDROCHLORIDE 0.4 MG: 0.4 CAPSULE ORAL at 08:50

## 2017-11-10 RX ADMIN — AMOXICILLIN 1000 MG: 500 CAPSULE ORAL at 08:50

## 2017-11-10 RX ADMIN — METFORMIN HYDROCHLORIDE 500 MG: 500 TABLET, FILM COATED ORAL at 12:03

## 2017-11-10 RX ADMIN — SUCRALFATE 1 G: 1 TABLET ORAL at 12:03

## 2017-11-10 RX ADMIN — PANTOPRAZOLE SODIUM 40 MG: 40 TABLET, DELAYED RELEASE ORAL at 06:07

## 2017-11-10 RX ADMIN — CLARITHROMYCIN 500 MG: 500 TABLET, FILM COATED ORAL at 17:44

## 2017-11-10 RX ADMIN — HYDROCHLOROTHIAZIDE: 12.5 CAPSULE ORAL at 08:49

## 2017-11-10 NOTE — PROGRESS NOTES
Patient had a restless night and did not sleep; refused offer of sleep medication. Ambulated to the bathroom twice and urinated well. BS was 78 during the night; a snack was provided and BS vito to 140 this morning. Hourly rounds were performed throughout the shift. All needs met at this time. Report given to oncoming nurse.

## 2017-11-10 NOTE — PROGRESS NOTES
End Of Shift Functional Summary, Nursing      TOILETING:  Does patient need assist with clothing management and/or pericare? Yes: Comment: SBA     TOILET TRANSFER:  Pt requires standby assistance/setup. Pt uses grab bars and walker . BLADDER:  Pt does not have a plunkett catheter that staff manages. Pt does take medication. Pt is continent. of bladder and voids in toilet  Pt has had 0 bladder accidents . (An accident is when the episode is not contained in a brief AND/OR the clothing/linen requires changing/cleaning up.)    BOWEL:  Pt does take medication. Pt is continent of bowel and uses toilet. Pt has had 0 bowel accidents during this shift  . (An accident is when the episode is not contained in a brief AND/OR the clothing/linen requires changing/cleaning up.)      EATING  Pt requires no assistance. Pt does not wear dentures. TUBE FEEDINGS:  Pt does not  receive nutrition through tube feedings. Patient requires no assistance with feedings. Documentation reviewed and plan of care discussed/reviewed with   patient during the shift.

## 2017-11-10 NOTE — PROGRESS NOTES
Problem: Nutrition Deficit  Goal: *Optimize nutritional status  Nutrition:  Malnutrition Screening Tool Referral for 14-23 pound weight loss without trying and eating poorly due to decreased appetite. Assessment:  Anthropometrics:  Ht - 6'0\", wgt - 89.5 kg (9th floor standing scale 11/10/17), BMI - 26.7 c/w \"overweight\", edema - none reported. Macronutrient Needs:  Estimated calorie needs - 2837-8197 myles/day (20-25 myles/kg/day)   Estimated protein needs - 81-97 gm pro/day (1-1.2 gm pro/kgIBW/day) (GFR >60 ml/min)  Intake/Comparative Standards: This patient's average intake of Concurrent ThinkingO mechanical soft diet for the past 2 days/4 meals: 100%. This potentially meets 98% of calorie and 100% of protein goals. Pertinent Labs:   AM glucose 166; POC glucose (11/10) 140,138,108. Pertinent Medications:   Glucophage, Glucotrol, SSI coverage. Diet:   CCHO mechanical soft. Food/Nutrition History:   81year old gentleman with a h/o diabetes and stroke admitted to rehab with reported weight loss. He reports that he lost his appetite, however it appears that his current intake is close to adequate. He could not recall his UBW. He reports no change in his sense of taste or smell. Diagnosis (Nutrition):  No acute nutrition diagnosis. Intervention:  Meals and Snacks: CCHO mechanical soft. Medical Food Supplement Therapy: Commercial Beverage: Butter pecan Glucerna Shake with all trays. Nutrition Discharge Plan: Too soon to determine. Kartik Brock  [phone]

## 2017-11-10 NOTE — PROGRESS NOTES
PHYSICAL THERAPY DAILY NOTE  Time In: 1430  Time Out: 1530  Patient Seen For: PM;Balance activities;Gait training; Therapeutic exercise;Transfer training    Subjective: \"I am happy to do whatever you want me to do\"         Objective:  Vital Signs:    Patient Vitals for the past 12 hrs:   Temp Pulse Resp BP SpO2   11/10/17 1203 - 70 - 134/73 -   11/10/17 0807 99.7 °F (37.6 °C) 77 16 174/86 96 %   11/10/17 0354 98.1 °F (36.7 °C) 71 18 162/79 96 %       Pain level: Patient had no complaints of pain in therapy this afternoon. Patient education: safety with ambulation using the r/walker and fall prevention - calling for assistance. Other (comment) (Fall risk)    TRANSFERS Daily Assessment   Verbal cues needed at times to ensure safety with turning and maneuvering r/walker during transfers. Transfer Type: SPT with walker  Other: toilet  Transfer Assistance : 4 (Contact guard assistance)  Sit to Stand Assistance: Minimal assistance  Car Transfers: Not tested       GAIT Daily Assessment   Patient can get distracted during ambulation and loose his focus on staying closer to the r/walker as he walks and slowing down his gia. With RLE quad fatigue the patient's knee tends to go back into extension more. Practiced ambulation without the r/walker with manual assist working on forward/backward and side stepping with minimal assist.   Amount of Assistance: 4 (Minimal assistance)  Distance (ft): 150 Feet (ft) (150 x 2 including carpet incline)  Assistive Device: Walker, rolling    Ambulated 4 x 5' forward/backward/side stepping with manual minimal assist.       BALANCE Daily Assessment    Sitting - Static: Good (unsupported)  Sitting - Dynamic: Good (unsupported)  Standing - Static: Fair  Standing - Dynamic : Impaired       LOWER EXTREMITY EXERCISES Daily Assessment   Patient showed improved RLE timing with step up exercise this afternoon.    Extremity: Both  Exercise Type #1: Seated lower extremity strengthening (1.5# for knee ext and hip flexion)  Sets Performed: 2  Reps Performed: 10  Level of Assist: Supervision  Exercise Type #2: Standing lower extremity strengthening (Alternate r/l step up exercises)  Sets Performed: 3  Reps Performed: 10  Level of Assist: Contact guard assistance  Exercise Type #3: Seated lower extremity strengthening (Motomed x 10 level 3)  Sets Performed: 2  Reps Performed: 10  Level of Assist: Supervision          Assessment: Patient appears to be making good progress and is improving his gait endurance and quality of ambulation. Patient was taken to his room in his w/c and was assisted to the toilet using the r/walker with CGA. Once complete he was assisted to the recliner chair and set up for comfort with the call light within reach. Plan of Care: Continue with his current POC.       Aden Hernadez, [address] [place] [address]  11/10/2017

## 2017-11-10 NOTE — PROGRESS NOTES
OT Daily Note  Time In 1115   Time Out 1202     Subjective: \"You were the one who asked me all those questions? \"   Pain: none reported  Education: re-educated on OT Role and POC, yellow sock policy, use of call bell   Interdisciplinary Communication: with   Precautions: Other (comment) (Fall risk)  Location on arrival: up in recliner    BUE Assessment and Interview Daily Assessment   Completed BUE assessment; see IRC for details. Completed interview portion of evaluation; see IRC for details. Noted RUE coordination impairments; would benefit from RUE gross/fine motor coordination tasks to address impairments. Most notably, 9 Hole Peg test:   L: 34 seconds  R: 4:49     Education Daily Assessment   Educated patient on using RUE as much as possible during functional tasks such as eating and reaching for paper towels after washing hands. Good understanding but would benefit from further education and training. Self-Care Daily Assessment   Self Care Task: Toileting  Level of Assist: 5 (Supervision) (close SBA to stand to urinate )  Adaptive Equipment:    Required toileting; see above. Patient stood for duration of urination. Good progress noted. Patient was left seated up in recliner with call light/all needs in reach and in no distress. Assessment: See above. Patient is a great candidate for therapy. Plan: Continue OT POC with focus on ADL/IADL skills, functional transfers, functional mobility, coordination, strength, static and dynamic balance, and activity tolerance to maximize safety and independence with ADLs and functional transfers.      Olamide Franks MS, OTR/L  11/10/2017

## 2017-11-10 NOTE — PROGRESS NOTES
Subjective \"I want to do things to get out of here. \"   Activity Evaluation   Strength/Endurance Patient with right sided weakness and with rest breaks needed. Balance Sit<->stand:  Min (A) with RW   Social Interaction Patient was friendly and conversational during the session. Cognitive A&O X4   Comments Patient was assited to [facility] PT at the end of the session. Patient's Recreational Therapy evaluation completed under the [facility] navigation tool on 11/10/17. Please see for specifics regarding plan of care and goals. Patient prefers to be called \"Omega. \" Thank you for the referral.  Perez [place], CTRS

## 2017-11-10 NOTE — PROGRESS NOTES
[facility] OCCUPATIONAL THERAPY INITIAL EVALUATION    Time In: 0700  Time Out: 0745    Precautions: Falls    Vitals:   Patient Vitals for the past 8 hrs:   Temp Pulse Resp BP SpO2   11/10/17 1203 - 70 - 134/73 -   11/10/17 0807 99.7 °F (37.6 °C) 77 16 174/86 96 %         Pain: Patient rated pain 0 out of 10. History of Presenting Illness (per previous reports): Mr. Tosin Chen is an extremely pleasant right handed 82yo AA male with htn, peripheral neuropathy, vertigo, Type 2 DM, thyroid disease and asthma, recent dx of H pylori who was admitted to [facility] on 11/6 with right sided weakness and slurred speech. One week prior to presentation he had an EGD and was placed on carafate and prilosec. He noticed the weakness of 11/1 but though it was maybe a side effect of the new medications. The following day he was dragging his right foot more and had slurring speech. He started using a walker at home due to RLE weakness. He finally decided to present to the ED on 11/6 due to the fact that the symptoms had not improved. Head CT was negative. He received notice that day from his GIs office regarding H pylori and was started on amoxil and biaxin for 14d. MRI confirmed an acute left pontine ischemic stroke. He was placed on Lipitor and ASA. Carotid duplex revealed elevated flow velocities in the common carotid arteries suggesting proximal stenosis, thus a CT angio of the carotids was performed, revealing moderate atherosclerotic calcification involving thoracic aorta with ectasia of the descending aorta. ( this will require monitoring by PCP as an outpt.) There was no appreciable stenosis of the subclavian or common carotid arteries. There was no appreciable stenosis in either carotid artery. I do not have the ECHO report documented in the records from [facility]. His BP has been poorly controlled and his bs have varied. He is tolerating a NDD3 diet with thin liquids.  He is CGA to min A with mobility and ADLs.    Past Medical History/ Co-morbidities:   Past Medical History:   Diagnosis Date    Abnormal weight gain     Acute bronchitis     Acute sinusitis, unspecified     Allergic rhinitis, cause unspecified 2/25/2015    Backache, unspecified 2/25/2015    Bladder neck obstruction     Carpal tunnel syndrome     Conjunctivitis unspecified     Cough     Delusional disorder(297.1) 2/25/2015    Dermatophytosis of nail     Dizziness and giddiness     Dyspepsia and other specified disorders of function of stomach     Hemorrhage of rectum and anus     Hypertrophy of prostate with urinary obstruction and other lower urinary tract symptoms (LUTS) 2/25/2015    Impotence of organic origin     Lipoma of unspecified site     Nausea alone     Nocturia     Nonspecific abnormal finding in stool contents     Other and unspecified hyperlipidemia 2/25/2015    Other malaise and fatigue     Other persistent mental disorders due to conditions classified elsewhere     Pain in joint, lower leg     Rash and other nonspecific skin eruption     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 or other cause, without mention of thyrotoxic crisis or storm     Type II or unspecified type diabetes mellitus without mention of complication, not stated as uncontrolled 2/25/2015    Unspecified asthma(493.90) 2/25/2015    Unspecified essential hypertension 2/25/2015    Unspecified hereditary and idiopathic peripheral neuropathy     Unspecified paranoid state     Unspecified vertiginous syndromes and labyrinthine disorders     Urinary frequency     Urinary tract infection, site not specified 2/25/2015       Patient's Goal: \"Get outta here! \"     Previous Level of Function: Patient was previously independent with all ADL and IADL tasks. Patient's wife assisted wtih cooking/cleaning. Patient completed own medication management. Patient ambulated without AE. Patient was driving prior to admission.      Home Situation    Environment Comments   House Situation Private residence    Lives Alone No    Support Systems Spouse/Significant Other/Partner, Family member(s) (Patient's sister and sister-in-law live close and can assist)    Shower Situation Shower    Current DME Walker, Grab bars, Glucometer, Blood pressure cuff (grab bars in shower and by toilet)    Lift Chair      Stairs to [employer] 1       Rails         W/C Ramp No    Interior Steps        Upper Extremity Function   LUE RUNAV   FMC  Intact  Impaired   GMC  Intact  Intact   Light Touch No apparent deficit Partial deficit (intact testing but patient reports sensation is \"different\" than LUE )   Sharp/Dull Discrimination Not tested Not tested   Hot/Cold No apparent deficit Not tested   Proprioception No apparent deficit No apparent deficit   Stereognosis No apparent deficit     9 Hole Peg Test Normal (34 seconds) Impaired (4:49 )   General Comments        LARRY MAC   General Evalutaion       AROM Within defined limits Within defined limits   Shoulder Flexion 5 4-   Shoulder Extension  5 4+   Shoulder Abduction 5 4-   Shoulder Adduction 5 4+   Elbow Flexion 5 3+   Elbow Extension  5 4   Wrist Flexion/Extension        5 (79 pounds) 4- (21 pounds)   General Comments     0/5 No palpable muscle contraction  1/5 Palpable muscle contraction, no joint movement  2-/5 Less than full range of motion in gravity eliminated position  2/5 Able to complete full range of motion in gravity eliminated position  2+/5 Able to initiate movement against gravity  3-/5 More than half but not full range of motion against gravity  3/5 Able to complete full range of motion against gravity  3+/5 Completes full range of motion against gravity with minimal resistance  4-/5 Completes full range of motion against gravity with minimal-moderate resistance  4/5 Completes full range of motion against gravity with moderate resistance  4+/5 Completes full range of motion against gravity with moderate-maximum resistance  5/5 Completes full range of motion against gravity with maximum resistance    Cognition   Performance Comments   Orientation Level      Comprehension Level 7 Mode: Auditory  Hearing Aid:None  Glasses:Glasses (glasses and reading glasses)   Expression (Native Language) 5 Mode: Verbal  some difficulty with articulation; had to repeat some words    Social Interaction/Pragmatics 7 very pleasant    Problem Solving 6     Memory 5 difficulty recalling people frequently seen; recalled 3/3 words   Comments       Activities of Daily Living    Score Comments   Self-Feeding 5 verbal cues to use RUE; setup assist to open packages    Grooming 5 Tasks completed by patient: Washed face, Washed hands  setup    Bathing 4 Body Parts Bathe: Abdomen, Arm, left, Arm, right, Buttocks, Chest, Lower leg and foot, left, Mena area, Lower leg and foot, right, Thigh, left, Thigh, right  CGA when standing for mena care    Tub/Shower Transfer [place] 4 Type of Shower: Shower  Adaptive  Equipment:Tub transfer bench, Grab bars and Walker   Upper Body  Dressing/Undressing 5 Items Applied:Pullover (4 steps)  setup assist; able to identify and correct problem of putting RUE through head hole    Lower Body Dressing/Undressing 4 Items Applied:Elastic waist pants (3 steps), Sock, left (1 step), Sock, right (1 step), Underpants (3 steps)  CGA when standing for clothing management up    Toileting 5 SBA   Toilet Transfer 4 CGA with RW      Vision/Perception: No changes from pre-morbid function where patient had B cataracts, wore glasses to drive, and wore reading glasses. Petersen appear intact; tracking appears intact. No nystagmus or diplopia noted. .    Instrumental Activities of Daily Living   Performance Comments   Meal Preperation Maximum assistance    Homemaking Maximum assistance    Medication Management Moderate assistance    Financial Management Moderate assistance        Session: Patient seated up in recliner on arrival to room. Agreeable to OT. Completed ADL; see above for FIMs. Interdisciplinary Communication: with PT regarding LOS and functional status. Patient/Family Education: Patient was/were educated On the role of OT, On POC, On IRC expectations and yellow sock policy, use of call bell. Problem List: [facility], [address], Activity Tolerance, Safety Awareness, Strength, Standing Balance and Cognition    Functional Limitations: ADL, IADL, Functional Transfers and Functional Mobility    Goals: Please see Care Plan    OT order received and chart reviewed. OT orders have been acknowledged. Patient will benefit from skilled OT services to address ADL, functional transfers, UE strength, UE coordination, balance, cognition and activity tolerance to maximize functional performance with daily self-care tasks and functional mobility. Treatment is likely to include ADL, Balance, Strength, Activity tolerance, Neuro-muscular re-education, Cognitive, DME, AE, Family  and Safety awareness training to increase independence with self-care. Patient will be seen for 1.5-2 hours of skilled OT services 5-6 days a week.      Yemi Wallace MS, OTR/L  11/10/2017

## 2017-11-10 NOTE — PROGRESS NOTES
PHYSICAL THERAPY DAILY NOTE  Time In: 0915  Time Out: 1000    Subjective: \"I just got here yesterday and have been really busy\"         Objective:  Vital Signs:    Patient Vitals for the past 12 hrs:   Temp Pulse Resp BP SpO2   11/10/17 0807 99.7 °F (37.6 °C) 77 16 174/86 96 %   11/10/17 0354 98.1 °F (36.7 °C) 71 18 162/79 96 %       Pain level:  Patient had no complaints of pain during therapy this morning. Patient education:  Continue with patient education regarding gait training with RW,energy conservation techniques and fall precautions    Interdisciplinary Communication:  Discussed initial eval with evaluating PT. Other (comment) (Fall risk)    TRANSFERS Daily Assessment    Transfer Type: SPT with walker  Other: Toilet  Transfer Assistance : 4 (Contact guard assistance)  Sit to Stand Assistance: Minimal assistance  Car Transfers: Not tested       GAIT Daily Assessment   Patient continues to need verbal cues for speed control, upright posture and staying closer to the r/walker during gait. Amount of Assistance: 4 (Minimal assistance)  Distance (ft): 75 Feet (ft) (75 x 3)  Assistive Device: Walker, rolling         BALANCE Daily Assessment   Patient is steady with standing balance with BUE support. Sitting - Static: Good (unsupported)  Sitting - Dynamic: Good (unsupported)  Standing - Static: Fair to Good         LOWER EXTREMITY EXERCISES Daily Assessment    Extremity: Both  Exercise Type #1: Seated lower extremity strengthening (1.5# used for knee ext and hip flexion)  Sets Performed: 2  Reps Performed: 10  Level of Assist: Supervision  Exercise Type #2: Seated lower extremity strengthening (NuStep x 10 at level 3)  Level of Assist: Supervision  Exercise Type #3: Standing lower extremity strengthening (Step exercise up/down left/right)  Sets Performed: 2  Reps Performed: 10  Level of Assist: Contact guard assistance          Assessment: Patient participated and tolerated therapy well today.   Need to focus more on safety with ambulation in that he tends to move fast with his activities at times without regard for his safety. Patient is very pleasant and cooperative. Patient was ambulated back to his room with the r/walker with CGA and verbal cues for speed control and upright posture. Plan of Care: Continue to progress as indicated.     Dev Lima, [address] [facility]  11/10/2017

## 2017-11-10 NOTE — PROGRESS NOTES
11/10/17 0908   Time Spent With Patient   Time In 0833   Time Out 0908   Mental Status   Neurologic State Alert   Orientation Level Oriented X4   Cognition Appropriate decision making;Memory loss; Follows commands   Perception Appears intact   Perseveration No perseveration noted   Safety/Judgement Fall prevention   Psychosocial   Patient Behaviors Calm   Reading Comprehension   Visual Impairment No impairment   Pre-Morbid Reading Status Literate   Overall Impairment Severity None   Written Expression   Pre-Morbid Dominant Hand Unknown/unable to assess      11/10/17 0909   Verbal Expression   Primary Mode of Expression Verbal   Initiation No impairment   Automatic Speech Task No impairment   Repetition No impairment   Naming No impairment   Conversation Dysarthric   Overall Impairment Mild   FIM Score (Verbal Expression) 5   Oral-Motor Structure/Motor Speech   Face No impairment   Labial Right droop; Decreased rate   Oral Hygiene adequate   Lingual Decreased rate   Apraxic Characteristics None   Dysarthric Characteristics Blended word boundaries   Overall Impairment Severity Mild   Neuro-Linguistics/Cognitive Function   Reasoning  No impairment   Organizational No impairment   Problem Solving No impairment   FIM Score (Problem Solving) 6   Mathematics No impairment   Memory  Short-term   FIM Score (Memory) 3   Attention  No impairment   Overall Impairment Severity Mild-moderate   Pragmatics   Pragmatics Impairment No impairment   Pragmatics Impairment Severity None   FIM Score (Pragmatics/Social Interaction) 7      11/10/17 0910   Mental Status   Neurologic State Alert   Orientation Level Oriented X4   Cognition Memory loss   Perception Appears intact   Perseveration No perseveration noted   Safety/Judgement Fall prevention   Oral Assessment   Labial Right droop   Oral Hygiene adequate   Lingual Decreased rate   PO Trials   Assessment Method(s) Observation   Patient Position up in recliner   Vocal Quality No impairment   Consistency Presented Puree; Solid; Thin liquid;Mixed consistency;Pill/Tablet   How Presented Straw;Spoon;Cup/sip; Self-fed/presented   Bolus Acceptance No impairment   Bolus Formation/Control Impaired   Propulsion No impairment   Oral Residue Less than 10% of bolus   Initiation of Swallow No impairment   Aspiration Signs/Symptoms Delayed cough/throat clear   Pharyngeal Phase Characteristics No impairment, issues, or problems    Effective Modifications Cup/sip   Findings and Recommendations   Oral Phase Severity Mild   Pharyngeal Phase Severity  Mild   Treatment Diagnosis   Treatment Diagnosis R13.12 oropharyngeal phase dysphagia     Pt completed basic cognitive-linguistic evaluation with performance as follows: simple yes/no questions 5/5, complex yes/no questions 5/5, 3 step commands 5/5, named 8 items per minute in food & animal categories, problem solving questions 5/5, reasoning questions 5/5 and recalled 1/3 items in short term memory task. Pt presents with noted cognitive-linguistic deficits in memory. Pt would benefit from ST 5 times a week to address this cognitive-linguistic deficits and assess higher level cognitive abilities secondary to prior level of function. Pt with mild dysarthria. Will complete oral motor exercises. Pt with positive signs/sx of aspiration with thin liquids via a straw and mixed; cough. Pt tolerated thin liquids via a cup. Pt tolerated pureed, solid and multiple medications with thin liquid via cup with no overt signs/sx of aspiration. Slow mastication with chewable textures. Recommend mechanical soft textures with no mixed with thin liquids with no straws.       Christin Tabor MA/CCC/SLP

## 2017-11-10 NOTE — PROGRESS NOTES
OT Daily Note  Time In 1300   Time Out 1340     Subjective: \"Sure, I'm ready. \"   Pain: none reported  Education: PRN medications not going into medication minder; hand placement during transfers; Saturday therapy schedule  Interdisciplinary Communication: nurse aware that patient out to therapy  Precautions: Other (comment) (Fall risk)  Location on arrival: up in recliner    Transfers Daily Assessment   Patient transferred recliner <>toilet<> WC using SPT with CGA. Progressing well with transfers. Improved stability since ADL transfers/mobility this AM.      Coordination Daily Assessment   Patient completed simulated medication management task x 5 medications, with focus on using RUE for fine motor coordination. Completed with one error for twice a day medication, but verbalized understanding after discussing how to manage twice a day medicine. Patient replaced all beads using RUE with good quality. Required increased time for task but completed with good quality. Good performance with RUE coordination during task. Patient was left seated up in recliner with call light/all needs in reach and in no distress. Assessment: Making great progress. Plan: Continue OT POC with focus on ADL/IADL skills, functional transfers, functional mobility, coordination, strength, static and dynamic balance, and activity tolerance to maximize safety and independence with ADLs and functional transfers.      Yemi Wallace MS, OTR/L  11/10/2017

## 2017-11-10 NOTE — PROGRESS NOTES
Pt sitting in recliner eating breakfast, he denies pain, shortness of breath. He has active bowel sounds in quadrants. Pt needs no assist with breakfast, open his own package of syrup, pt shows no signs fo swallowing difficulty. Pt ambulates with walker and SBA. Pt encourage to call for any needs. Call light within reach.

## 2017-11-10 NOTE — PROGRESS NOTES
William Li MD,   Medical Director  [address] [facility], [address] [facility]  Tel: [phone]       D PROGRESS NOTE    Fiona Dinero  Admit Date: 11/9/2017  Admit Diagnosis: Left CVA; Stroke (cerebrum) (HCC)    Subjective     A bit confused. Didn't rest well last night. No complaints otherwise. \" I think I need to shave, my razor is right there. \" ( there was nothing there)    Objective:     Current Facility-Administered Medications   Medication Dose Route Frequency    alum-mag hydroxide-simeth (MYLANTA) oral suspension 30 mL  30 mL Oral Q4H PRN    magnesium hydroxide (MILK OF MAGNESIA) 400 mg/5 mL oral suspension 30 mL  30 mL Oral DAILY PRN    acetaminophen (TYLENOL) tablet 650 mg  650 mg Oral Q6H PRN    traZODone (DESYREL) tablet 25 mg  25 mg Oral QHS PRN    aspirin chewable tablet 81 mg  81 mg Oral DAILY    hydrALAZINE (APRESOLINE) tablet 25 mg  25 mg Oral QID PRN    docusate sodium (COLACE) capsule 100 mg  100 mg Oral DAILY    bisacodyl (DULCOLAX) suppository 10 mg  10 mg Rectal DAILY PRN    pantoprazole (PROTONIX) tablet 40 mg  40 mg Oral ACB&D    atorvastatin (LIPITOR) tablet 80 mg  80 mg Oral DAILY    amoxicillin (AMOXIL) capsule 1,000 mg  1,000 mg Oral Q12H    clarithromycin (BIAXIN) tablet 500 mg  500 mg Oral BID    finasteride (PROSCAR) tablet 5 mg  5 mg Oral DAILY    glipiZIDE (GLUCOTROL) tablet 20 mg  20 mg Oral ACB    benazepril/hydroCHLOROthiazide (LOTENSIN HCT) 10/12.5 mg   Oral DAILY    fluticasone (FLONASE) 50 mcg/actuation nasal spray 2 Spray  2 Spray Both Nostrils DAILY    loratadine (CLARITIN) tablet 10 mg  10 mg Oral QPM    sucralfate (CARAFATE) tablet 1 g  1 g Oral AC&HS    tamsulosin (FLOMAX) capsule 0.4 mg  0.4 mg Oral DAILY    metFORMIN (GLUCOPHAGE) tablet 500 mg  500 mg Oral TID WITH MEALS    insulin lispro (HUMALOG) injection   SubCUTAneous AC&HS    ondansetron (ZOFRAN ODT) tablet 4 mg  4 mg Oral Q6H PRN Review of Systems:Denies chest pain, shortness of breath, cough, headache, visual problems, abdominal pain, dysurea, calf pain. Pertinent positives are as noted in the medical records and unremarkable otherwise. Visit Vitals    /86    Pulse 77    Temp 99.7 °F (37.6 °C)    Resp 16    SpO2 96%        Physical Exam:   General: Alert and oriented x3  No acute cardio respiratory distress. HEENT: Normocephalic,no scleral icterus  Oral mucosa moist without cyanosis; right facial weakness   Lungs: Clear to auscultation  bilaterally. Respiration even and unlabored   Heart: Regular rate and rhythm, S1, S2   No  murmurs, clicks, rub or gallops   Abdomen: Soft, non-tender, nondistended. Bowel sounds present. No organomegaly. Genitourinary: Benign . Neuromuscular:      RLE 4 to 4-  RUE 4-, dysmetric, pronator drift  Right facial weakness with noted dysarthria, EOMI   Skin/extremity: No rashes, no erythema. No calf tenderness BLE  No edema. Functional Assessment:          Balance  Sitting - Static: Good (unsupported) (11/09/17 1600)  Sitting - Dynamic: Good (unsupported) (11/09/17 1600)  Standing - Static: Fair (11/09/17 1600)                     [facility] Fall Risk Assessment:  [facility] Fall Risk  Mobility: Ambulates or transfers with assist devices or assistance/unsteady gait (11/09/17 1943)  Mobility Interventions: Communicate number of staff needed for ambulation/transfer;Patient to call before getting OOB;Utilize walker, cane, or other assitive device;Utilize gait belt for transfers/ambulation (11/09/17 1943)  Mentation: Alert, oriented x 3 (11/09/17 1943)  Medication: Patient receiving anticonvulsants, sedatives(tranquilizers), psychotropics or hypnotics, hypoglycemics, narcotics, sleep aids, antihypertensives, laxatives, or diuretics (11/09/17 1943)  Medication Interventions: Bed/chair exit alarm; Patient to call before getting OOB;Teach patient to arise slowly;Utilize gait belt for transfers/ambulation (11/09/17 1943)  Elimination: Needs assistance with toileting (11/09/17 1943)  Elimination Interventions: Call light in reach; Patient to call for help with toileting needs; Toilet paper/wipes in reach; Toileting schedule/hourly rounds;Urinal in reach (11/09/17 1943)  Prior Fall History: No (11/09/17 1943)  Total Score: 3 (11/09/17 1943)  Standard Fall Precautions: Yes (11/09/17 1132)  High Fall Risk: Yes (11/09/17 1943)     Speech Assessment:  Aspiration Signs/Symptoms: Delayed cough/throat clear (11/10/17 0910)      Ambulation:  Gait  Distance (ft): 20 Feet (ft) (11/09/17 1600)     Labs/Studies:  Recent Results (from the past 72 hour(s))   GLUCOSE, POC    Collection Time: 11/09/17 11:28 AM   Result Value Ref Range    Glucose (POC) 165 (H) 65 - 100 mg/dL   GLUCOSE, POC    Collection Time: 11/09/17  3:57 PM   Result Value Ref Range    Glucose (POC) 206 (H) 65 - 100 mg/dL   GLUCOSE, POC    Collection Time: 11/09/17  8:39 PM   Result Value Ref Range    Glucose (POC) 130 (H) 65 - 100 mg/dL   GLUCOSE, POC    Collection Time: 11/10/17  1:27 AM   Result Value Ref Range    Glucose (POC) 78 65 - 100 mg/dL   GLUCOSE, POC    Collection Time: 11/10/17  6:06 AM   Result Value Ref Range    Glucose (POC) 140 (H) 65 - 100 mg/dL   GLUCOSE, POC    Collection Time: 11/10/17  7:38 AM   Result Value Ref Range    Glucose (POC) 138 (H) 65 - 100 mg/dL   CBC W/O DIFF    Collection Time: 11/10/17  8:13 AM   Result Value Ref Range    WBC 7.1 4.3 - 11.1 K/uL    RBC 5.30 4.23 - 5.67 M/uL    HGB 14.2 13.6 - 17.2 g/dL    HCT 41.0 (L) 41.1 - 50.3 %    MCV 77.4 (L) 79.6 - 97.8 FL    MCH 26.8 26.1 - 32.9 PG    MCHC 34.6 31.4 - 35.0 g/dL    RDW 12.9 11.9 - 14.6 %    PLATELET 041 337 - 851 K/uL    MPV 10.1 (L) 10.8 - 14.1 FL       Assessment:     Problem List as of 11/10/2017  Date Reviewed: 10/23/2017          Codes Class Noted - Resolved    Stroke (cerebrum) ([facility] ICD-10-CM: I63.9  ICD-9-CM: 434.91  11/9/2017 - Present        Abdominal bloating ICD-10-CM: R14.0  ICD-9-CM: 787.3  10/23/2017 - Present        Nausea ICD-10-CM: R11.0  ICD-9-CM: 787.02  8/15/2017 - Present        Fatty liver ICD-10-CM: K76.0  ICD-9-CM: 571.8  6/28/2017 - Present        Lower abdominal pain ICD-10-CM: R10.30  ICD-9-CM: 789.09  6/19/2017 - Present        Neck pain ICD-10-CM: M54.2  ICD-9-CM: 723.1  12/27/2016 - Present        GERD (gastroesophageal reflux disease) ICD-10-CM: K21.9  ICD-9-CM: 530.81  6/23/2016 - Present        Type 2 diabetes mellitus ([facility] ICD-10-CM: E11.9  ICD-9-CM: 250.00  2/25/2015 - Present        Allergic rhinitis ICD-10-CM: J30.9  ICD-9-CM: 477.9  2/25/2015 - Present        HLD (hyperlipidemia) ICD-10-CM: E78.5  ICD-9-CM: 272.4  2/25/2015 - Present        Enlarged prostate with lower urinary tract symptoms (LUTS) ICD-10-CM: N40.1  ICD-9-CM: 600.01  2/25/2015 - Present        Essential hypertension ICD-10-CM: I10  ICD-9-CM: 401.9  2/25/2015 - Present        Asthma ICD-10-CM: J45.909  ICD-9-CM: 493.90  2/25/2015 - Present        RESOLVED: Del[facility] ([facility] ICD-10-CM: F22  ICD-9-CM: 297.1  2/25/2015 - 6/28/2017        RESOLVED: Backache, unspecified ICD-10-CM: M54.9  ICD-9-CM: 724.5  2/25/2015 - 10/23/2017        RESOLVED: Urinary tract infection, site not specified ICD-10-CM: N39.0  ICD-9-CM: 599.0  2/25/2015 - 10/23/2017            Pontine Infarct, ischemic       Continue daily physician medical management:  Pneumonia prophylaxis- Incentive spirometer every hour while awake     Ischemic CVA; cont asa and lipitor; permissive htn     DVT risk / DVT Prophylaxis- Will require daily physician exam to assess for signs and symptoms as patient is at increased risk for of thromboembolism. Mobilization as tolerated. Intermittent pneumatic compression devices when in bed Thigh-high or knee-high thromboembolic deterrent hose when out of bed.  Add subcut heparin      Pain Control: no current joint or muscle symptoms, essentially pain-free. Will require regular pain assessment and comprenhensive pain management,      Hypertension - BP uncontrolled, fluctuating, managed medically. Continue Lotensin and add prn hydralazine. His SBP upon arrival was >220 and DBP >110   monitor closely, consider adding metoprolol; want permissive htn for cerebral perfusion but goal 120-140s  -11/10 174/86 add norvasc     Diabetes mellitus - Uncontrolled. poor glycemic control. Will require daily, close FSG monitoring and medication adjustment to optimize glycemic control in setting of acute illness and hospitalization. Cont metformin and glucotrol 20mg. Add SSI. Cont carb restricted diet, accuchecks qac and qhs  -11/10 BS fine     Urinary retention/ neurogenic bladder - schedule voids q6-8 hrs. Check post-void residual as needed; In and Out catheterize if post-void residual is more than 400 cc. Pt with hx of prostate issues . Cont flomax and proscar     bowel program - add colace and prn bowel program     GERD - resume PPI. At times may need additional antacids, Maalox prn. On omeprazole which is nonformulary, thus change to protonix 40mg bid.  -H pylori; cont Biaxin and amoxil and carafate. Will treat with dual abx for 2 weeks. Will f/u with GI post dc     Asthma/allergic rhinitis; cont flonase and zyrtec.          Time spent was 25 minutes with over 1/2 in direct patient care/examination, consultation and coordination of care.      Signed By: Karena Munroe MD     November 10, 2017

## 2017-11-10 NOTE — PROGRESS NOTES
Problem: Falls - Risk of  Goal: *Absence of Falls  Document Jaiden Fall Risk and appropriate interventions in the flowsheet.    Outcome: Progressing Towards Goal  Fall Risk Interventions:  Mobility Interventions: Communicate number of staff needed for ambulation/transfer, Patient to call before getting OOB, Utilize walker, cane, or other assitive device, Utilize gait belt for transfers/ambulation         Medication Interventions: Bed/chair exit alarm, Patient to call before getting OOB, Teach patient to arise slowly, Utilize gait belt for transfers/ambulation    Elimination Interventions: Call light in reach, Patient to call for help with toileting needs, Toilet paper/wipes in reach, Toileting schedule/hourly rounds, Urinal in reach

## 2017-11-10 NOTE — PROGRESS NOTES
End Of Shift Functional Summary, Nursing      TOILETING:  Does patient need assist with clothing management and/or pericare? No    TOILET TRANSFER:  Pt requires moderate assistance. Pt uses walker. BLADDER:  Pt does not have a plunkett catheter that staff manages. Pt does not take medication. Pt is continent. of bladder and voids in toilet   Pt has had 0 bladder accidents during this shift  (An accident is when the episode is not contained in a brief AND/OR the clothing/linen requires changing/cleaning up.)        BED/CHAIR TRANSFER  Pt requires moderate assistance. Patient requires the assistance of 1 staff member(s). Pt uses walker        EATING  Pt requires no assistance. Pt does not wear dentures. TUBE FEEDINGS:  Pt does not  receive nutrition through tube feedings. Documentation reviewed and plan of care discussed/reviewed with   patient assistant during the shift.

## 2017-11-10 NOTE — PROGRESS NOTES
End Of Shift Functional Summary, Nursing      TOILETING:  Does patient need assist with clothing management and/or pericare? yes    TOILET TRANSFER:  Pt requires minimal assistance. Pt uses walker. BLADDER:  Pt does not have a plunkett catheter that staff manages. Pt is continent. of bladder and voids in toilet. Pt has had 0 bladder accidents during this shift.(An accident is when the episode is not contained in a brief AND/OR the clothing/linen requires changing/cleaning up.)    BOWEL:   Pt is continent of bowel and uses toilet. Pt. Has not had a bm             Documentation reviewed and plan of care discussed/reviewed with   patient assistant during the shift.

## 2017-11-11 LAB
GLUCOSE BLD STRIP.AUTO-MCNC: 111 MG/DL (ref 65–100)
GLUCOSE BLD STRIP.AUTO-MCNC: 121 MG/DL (ref 65–100)
GLUCOSE BLD STRIP.AUTO-MCNC: 134 MG/DL (ref 65–100)
GLUCOSE BLD STRIP.AUTO-MCNC: 138 MG/DL (ref 65–100)
GLUCOSE BLD STRIP.AUTO-MCNC: 165 MG/DL (ref 65–100)

## 2017-11-11 PROCEDURE — 97116 GAIT TRAINING THERAPY: CPT

## 2017-11-11 PROCEDURE — 74011636637 HC RX REV CODE- 636/637: Performed by: PHYSICAL MEDICINE & REHABILITATION

## 2017-11-11 PROCEDURE — 65310000000 HC RM PRIVATE REHAB

## 2017-11-11 PROCEDURE — 82962 GLUCOSE BLOOD TEST: CPT

## 2017-11-11 PROCEDURE — 97530 THERAPEUTIC ACTIVITIES: CPT

## 2017-11-11 PROCEDURE — 97535 SELF CARE MNGMENT TRAINING: CPT

## 2017-11-11 PROCEDURE — 74011250637 HC RX REV CODE- 250/637: Performed by: PHYSICAL MEDICINE & REHABILITATION

## 2017-11-11 RX ORDER — TRAZODONE HYDROCHLORIDE 50 MG/1
50 TABLET ORAL
Status: DISCONTINUED | OUTPATIENT
Start: 2017-11-11 | End: 2017-11-12

## 2017-11-11 RX ADMIN — INSULIN LISPRO 2 UNITS: 100 INJECTION, SOLUTION INTRAVENOUS; SUBCUTANEOUS at 12:52

## 2017-11-11 RX ADMIN — GLIPIZIDE 20 MG: 5 TABLET ORAL at 09:28

## 2017-11-11 RX ADMIN — SUCRALFATE 1 G: 1 TABLET ORAL at 12:52

## 2017-11-11 RX ADMIN — FLUTICASONE PROPIONATE 2 SPRAY: 50 SPRAY, METERED NASAL at 12:53

## 2017-11-11 RX ADMIN — ATORVASTATIN CALCIUM 80 MG: 40 TABLET, FILM COATED ORAL at 09:29

## 2017-11-11 RX ADMIN — AMOXICILLIN 1000 MG: 500 CAPSULE ORAL at 09:29

## 2017-11-11 RX ADMIN — SUCRALFATE 1 G: 1 TABLET ORAL at 17:06

## 2017-11-11 RX ADMIN — FINASTERIDE 5 MG: 5 TABLET, FILM COATED ORAL at 09:27

## 2017-11-11 RX ADMIN — TAMSULOSIN HYDROCHLORIDE 0.4 MG: 0.4 CAPSULE ORAL at 09:29

## 2017-11-11 RX ADMIN — AMOXICILLIN 1000 MG: 500 CAPSULE ORAL at 22:20

## 2017-11-11 RX ADMIN — METFORMIN HYDROCHLORIDE 500 MG: 500 TABLET, FILM COATED ORAL at 09:28

## 2017-11-11 RX ADMIN — CLARITHROMYCIN 500 MG: 500 TABLET, FILM COATED ORAL at 09:29

## 2017-11-11 RX ADMIN — METFORMIN HYDROCHLORIDE 500 MG: 500 TABLET, FILM COATED ORAL at 17:06

## 2017-11-11 RX ADMIN — METFORMIN HYDROCHLORIDE 500 MG: 500 TABLET, FILM COATED ORAL at 12:52

## 2017-11-11 RX ADMIN — LORATADINE 10 MG: 10 TABLET ORAL at 17:07

## 2017-11-11 RX ADMIN — DOCUSATE SODIUM 100 MG: 100 CAPSULE, LIQUID FILLED ORAL at 09:28

## 2017-11-11 RX ADMIN — AMLODIPINE BESYLATE 5 MG: 5 TABLET ORAL at 09:29

## 2017-11-11 RX ADMIN — SUCRALFATE 1 G: 1 TABLET ORAL at 05:47

## 2017-11-11 RX ADMIN — PANTOPRAZOLE SODIUM 40 MG: 40 TABLET, DELAYED RELEASE ORAL at 05:47

## 2017-11-11 RX ADMIN — HYDROCHLOROTHIAZIDE: 12.5 CAPSULE ORAL at 09:27

## 2017-11-11 RX ADMIN — ASPIRIN 81 MG CHEWABLE TABLET 81 MG: 81 TABLET CHEWABLE at 09:29

## 2017-11-11 RX ADMIN — SUCRALFATE 1 G: 1 TABLET ORAL at 22:20

## 2017-11-11 RX ADMIN — PANTOPRAZOLE SODIUM 40 MG: 40 TABLET, DELAYED RELEASE ORAL at 17:07

## 2017-11-11 RX ADMIN — TRAZODONE HYDROCHLORIDE 50 MG: 50 TABLET ORAL at 22:20

## 2017-11-11 RX ADMIN — CLARITHROMYCIN 500 MG: 500 TABLET, FILM COATED ORAL at 17:06

## 2017-11-11 NOTE — PROGRESS NOTES
End Of Shift Functional Summary, Nursing      TOILETING:  Does patient need assist with clothing management and/or pericare? No    TOILET TRANSFER:  Pt requires minimal assistance. Pt uses walker. BLADDER:  Pt does not have a plunkett catheter that staff manages. Pt does not take medication. Pt is continent. of bladder and voids in toilet  Pt requires staff to change brief Pt has had 0 bladder accidents during this shift       Documentation reviewed and plan of care discussed/reviewed with   patient during the shift.

## 2017-11-11 NOTE — PROGRESS NOTES
End Of Shift Functional Summary, Nursing      TOILETING:  Does patient need assist with clothing management and/or pericare?yes    TOILET TRANSFER:  Pt requires standby assistance/setup. Pt uses walker. BLADDER:  Pt does not  have a plunkett catheter that staff manages. Pt is continent. of bladder and voids in toilet. Pt has had 0 bladder accidents during this shift.   (An accident is when the episode is not contained in a brief AND/OR the clothing/linen requires changing/cleaning up.)    BOWEL:    Pt is continent of bowel and uses toilet. Pt has had 0 bowel accidents during this shift   (An accident is when the episode is not contained in a brief AND/OR the clothing/linen requires changing/cleaning up.)            Documentation reviewed and plan of care discussed/reviewed with   patient assistant during the shift.

## 2017-11-11 NOTE — PROGRESS NOTES
End Of Shift Functional Summary, Nursing      TOILETING:  Does patient need assist with clothing management and/or pericare? yes    TOILET TRANSFER:  Pt requires standby assistance/setup. Pt uses walker. BLADDER:  Pt does not have a plunkett catheter that staff manages. Pt does take medication. Pt is continent. of bladder and voids in toilet  Pt requires staff to position device Pt has had 0 bladder accidents during this shift requiring standby assistance/setup to clean up. (An accident is when the episode is not contained in a brief AND/OR the clothing/linen requires changing/cleaning up.)    BOWEL:  Pt does take medication. Pt is continent of bowel and uses toilet. Pt requires staff to position device    Pt has had 0 bowel accidents during this shift requiring standby assistance/setup from staff to clean up.  (An accident is when the episode is not contained in a brief AND/OR the clothing/linen requires changing/cleaning up.)

## 2017-11-11 NOTE — PROGRESS NOTES
11/11/17 1231   Time Spent With Patient   Time In 0951   Time Out 1043   Patient Seen For: AM;ADLs   Feeding/Eating   Feeding/Eating Assistance NT   Grooming   Grooming Assistance  SBA   Comments SBA when standing to brush teeth, rolling walker   Upper Body Bathing   Bathing Assistance, Upper S   Adaptive Equipment Tub bench;Grab bar   Comments set up for supplies, seated   Lower Body Bathing   Bathing Assistance, Lower  SBA   Position Performed Bending forward method;Seated in chair   Adaptive Equipment Tub bench;Grab bar   Comments SBA when standing in shower to wash buttocks   Toileting   Toileting Assistance (FIM Score) S   Cues (SBA for balance and safety)   Adaptive Equipment Walker;Grab bars;Elevated seat   Upper Body Dressing    Dressing Assistance  S   Comments set up for pull over shirt   Lower Body Dressing    Dressing Assistance  Min A   Leg Crossed Method Used Yes   Adaptive Equipment Used Walker   Don/Doff Anti-Embolic Stockings NT   Comments assistance to don socks, SBA for balance when standing   Functional Transfers   Toilet Transfer  Grab bars;Stand pivot transfer with walker   Amount of Assistance Required SBA   Amount of Assistance Required CGA   Adaptive Equipment Grab bars; Walker (comment); Tub transfer bench     Patient agreeable to shower. See grid above for FIM scores. At the end of ADL issued patient a blue, resistant sponge to improve  strength of right hand while in room. Patient demonstrated good understanding of  and digit strength exercises. At the end of session patient was sitting up in recliner with all essentials within reach.     Bethanie Peñaloza OTR/L

## 2017-11-11 NOTE — PROGRESS NOTES
Lenka Snow MD,   Medical Director  [address] [facility], [place], [address] [facility]  Tel: [phone]       [facility] PROGRESS NOTE    Librado Evans  Admit Date: 11/9/2017  Admit Diagnosis: Left CVA  Stroke (cerebrum) ([address]    Subjective     A bit confused. Didn't rest well last night. No complaints otherwise. \" I think I need to shave, my razor is right there. \" ( there was nothing there)    Patient seen and examined. Reports poor sleep overnight with low dose trazodone given. + aphasic. Denies pain, lightheadedness, palpations, SOB, nausea or abdominal pain. Participating in am therapy.     Objective:     Current Facility-Administered Medications   Medication Dose Route Frequency    amLODIPine (NORVASC) tablet 5 mg  5 mg Oral DAILY    alum-mag hydroxide-simeth (MYLANTA) oral suspension 30 mL  30 mL Oral Q4H PRN    magnesium hydroxide (MILK OF MAGNESIA) 400 mg/5 mL oral suspension 30 mL  30 mL Oral DAILY PRN    acetaminophen (TYLENOL) tablet 650 mg  650 mg Oral Q6H PRN    traZODone (DESYREL) tablet 25 mg  25 mg Oral QHS PRN    aspirin chewable tablet 81 mg  81 mg Oral DAILY    hydrALAZINE (APRESOLINE) tablet 25 mg  25 mg Oral QID PRN    docusate sodium (COLACE) capsule 100 mg  100 mg Oral DAILY    bisacodyl (DULCOLAX) suppository 10 mg  10 mg Rectal DAILY PRN    pantoprazole (PROTONIX) tablet 40 mg  40 mg Oral ACB&D    atorvastatin (LIPITOR) tablet 80 mg  80 mg Oral DAILY    amoxicillin (AMOXIL) capsule 1,000 mg  1,000 mg Oral Q12H    clarithromycin (BIAXIN) tablet 500 mg  500 mg Oral BID    finasteride (PROSCAR) tablet 5 mg  5 mg Oral DAILY    glipiZIDE (GLUCOTROL) tablet 20 mg  20 mg Oral ACB    benazepril/hydroCHLOROthiazide (LOTENSIN HCT) 10/12.5 mg   Oral DAILY    fluticasone (FLONASE) 50 mcg/actuation nasal spray 2 Spray  2 Spray Both Nostrils DAILY    loratadine (CLARITIN) tablet 10 mg  10 mg Oral QPM    sucralfate (CARAFATE) tablet 1 g  1 g Oral AC&HS    tamsulosin (FLOMAX) capsule 0.4 mg  0.4 mg Oral DAILY    metFORMIN (GLUCOPHAGE) tablet 500 mg  500 mg Oral TID WITH MEALS    insulin lispro (HUMALOG) injection   SubCUTAneous AC&HS    ondansetron (ZOFRAN ODT) tablet 4 mg  4 mg Oral Q6H PRN     Review of Systems:Denies chest pain, shortness of breath, cough, headache, visual problems, abdominal pain, dysurea, calf pain. Pertinent positives are as noted in the medical records and unremarkable otherwise. Visit Vitals    /85 (BP 1 Location: Right arm)    Pulse 77    Temp 97.8 °F (36.6 °C)    Resp 16    Ht 6' (1.829 m)    Wt 89.4 kg (197 lb)    SpO2 97%    BMI 26.72 kg/m2        Physical Exam:   General: Alert and oriented x3  No acute cardio respiratory distress. HEENT: Normocephalic,no scleral icterus  Oral mucosa moist without cyanosis; right facial weakness   Lungs: Clear to auscultation  bilaterally. Respiration even and unlabored   Heart: Regular rate and rhythm, S1, S2   No  murmurs, clicks, rub or gallops   Abdomen: Soft, non-tender, nondistended. Bowel sounds present. No organomegaly. Genitourinary: Benign . Neuromuscular:      RLE 4 to 4-  RUE 4-, dysmetric, pronator drift  Right facial weakness with noted dysarthria, EOMI   Skin/extremity: No rashes, no erythema. No calf tenderness BLE  No edema.                                                                             Functional Assessment:          Balance  Sitting - Static: Good (unsupported) (11/10/17 1500)  Sitting - Dynamic: Good (unsupported) (11/10/17 1500)  Standing - Static: Fair (11/10/17 1500)  Standing - Dynamic : Impaired (11/10/17 1500)                     Nancy Curry Fall Risk Assessment:  Nancy Curry Fall Risk  Mobility: Ambulates or transfers with assist devices or assistance/unsteady gait (11/11/17 0045)  Mobility Interventions: Patient to call before getting OOB (11/11/17 0045)  Mentation: Alert, oriented x 3 (11/11/17 0045)  Medication: Patient receiving anticonvulsants, sedatives(tranquilizers), psychotropics or hypnotics, hypoglycemics, narcotics, sleep aids, antihypertensives, laxatives, or diuretics (11/11/17 0045)  Medication Interventions: Patient to call before getting OOB (11/11/17 0045)  Elimination: Needs assistance with toileting (11/11/17 0045)  Elimination Interventions: Call light in reach (11/11/17 0045)  Prior Fall History: No (11/11/17 0045)  Total Score: 3 (11/11/17 0045)  Standard Fall Precautions: Yes (11/09/17 1132)  High Fall Risk: Yes (11/11/17 0045)     Speech Assessment:  Aspiration Signs/Symptoms: Delayed cough/throat clear (11/10/17 0910)      Ambulation:  Gait  Distance (ft): 150 Feet (ft) (150 x 2 including carpet incline) (11/10/17 1500)  Assistive Device: Walker, rolling (11/10/17 1500)     Labs/Studies:  Recent Results (from the past 72 hour(s))   GLUCOSE, POC    Collection Time: 11/09/17 11:28 AM   Result Value Ref Range    Glucose (POC) 165 (H) 65 - 100 mg/dL   GLUCOSE, POC    Collection Time: 11/09/17  3:57 PM   Result Value Ref Range    Glucose (POC) 206 (H) 65 - 100 mg/dL   GLUCOSE, POC    Collection Time: 11/09/17  8:39 PM   Result Value Ref Range    Glucose (POC) 130 (H) 65 - 100 mg/dL   GLUCOSE, POC    Collection Time: 11/10/17  1:27 AM   Result Value Ref Range    Glucose (POC) 78 65 - 100 mg/dL   GLUCOSE, POC    Collection Time: 11/10/17  6:06 AM   Result Value Ref Range    Glucose (POC) 140 (H) 65 - 100 mg/dL   GLUCOSE, POC    Collection Time: 11/10/17  7:38 AM   Result Value Ref Range    Glucose (POC) 138 (H) 65 - 100 mg/dL   CBC W/O DIFF    Collection Time: 11/10/17  8:13 AM   Result Value Ref Range    WBC 7.1 4.3 - 11.1 K/uL    RBC 5.30 4.23 - 5.67 M/uL    HGB 14.2 13.6 - 17.2 g/dL    HCT 41.0 (L) 41.1 - 50.3 %    MCV 77.4 (L) 79.6 - 97.8 FL    MCH 26.8 26.1 - 32.9 PG    MCHC 34.6 31.4 - 35.0 g/dL    RDW 12.9 11.9 - 14.6 %    PLATELET 078 356 - 638 K/uL    MPV 10.1 (L) 10.8 - 65.3 FL   METABOLIC PANEL, COMPREHENSIVE Collection Time: 11/10/17  8:13 AM   Result Value Ref Range    Sodium 140 136 - 145 mmol/L    Potassium 3.7 3.5 - 5.1 mmol/L    Chloride 102 98 - 107 mmol/L    CO2 30 21 - 32 mmol/L    Anion gap 8 7 - 16 mmol/L    Glucose 166 (H) 65 - 100 mg/dL    BUN 12 8 - 23 MG/DL    Creatinine 1.17 0.8 - 1.5 MG/DL    GFR est AA >60 >60 ml/min/1.73m2    GFR est non-AA >60 >60 ml/min/1.73m2    Calcium 8.8 8.3 - 10.4 MG/DL    Bilirubin, total 0.4 0.2 - 1.1 MG/DL    ALT (SGPT) 25 12 - 65 U/L    AST (SGOT) 13 (L) 15 - 37 U/L    Alk.  phosphatase 65 50 - 136 U/L    Protein, total 7.4 6.3 - 8.2 g/dL    Albumin 3.5 3.2 - 4.6 g/dL    Globulin 3.9 (H) 2.3 - 3.5 g/dL    A-G Ratio 0.9 (L) 1.2 - 3.5     MAGNESIUM    Collection Time: 11/10/17  8:13 AM   Result Value Ref Range    Magnesium 2.4 1.8 - 2.4 mg/dL   GLUCOSE, POC    Collection Time: 11/10/17 11:21 AM   Result Value Ref Range    Glucose (POC) 108 (H) 65 - 100 mg/dL   GLUCOSE, POC    Collection Time: 11/10/17  4:33 PM   Result Value Ref Range    Glucose (POC) 50 (L) 65 - 100 mg/dL   GLUCOSE, POC    Collection Time: 11/10/17  7:23 PM   Result Value Ref Range    Glucose (POC) 194 (H) 65 - 100 mg/dL   GLUCOSE, POC    Collection Time: 11/10/17  8:41 PM   Result Value Ref Range    Glucose (POC) 179 (H) 65 - 100 mg/dL   GLUCOSE, POC    Collection Time: 11/11/17  1:08 AM   Result Value Ref Range    Glucose (POC) 138 (H) 65 - 100 mg/dL   GLUCOSE, POC    Collection Time: 11/11/17  7:13 AM   Result Value Ref Range    Glucose (POC) 134 (H) 65 - 100 mg/dL       Assessment:     Problem List as of 11/11/2017  Date Reviewed: 10/23/2017          Codes Class Noted - Resolved    Stroke (cerebrum) ([facility] ICD-10-CM: I63.9  ICD-9-CM: 434.91  11/9/2017 - Present        Abdominal bloating ICD-10-CM: R14.0  ICD-9-CM: 787.3  10/23/2017 - Present        Nausea ICD-10-CM: R11.0  ICD-9-CM: 787.02  8/15/2017 - Present        Fatty liver ICD-10-CM: K76.0  ICD-9-CM: 571.8  6/28/2017 - Present        Lower abdominal pain ICD-10-CM: R10.30  ICD-9-CM: 789.09  6/19/2017 - Present        Neck pain ICD-10-CM: M54.2  ICD-9-CM: 723.1  12/27/2016 - Present        GERD (gastroesophageal reflux disease) ICD-10-CM: K21.9  ICD-9-CM: 530.81  6/23/2016 - Present        Type 2 diabetes mellitus ([facility] ICD-10-CM: E11.9  ICD-9-CM: 250.00  2/25/2015 - Present        Allergic rhinitis ICD-10-CM: J30.9  ICD-9-CM: 477.9  2/25/2015 - Present        HLD (hyperlipidemia) ICD-10-CM: E78.5  ICD-9-CM: 272.4  2/25/2015 - Present        Enlarged prostate with lower urinary tract symptoms (LUTS) ICD-10-CM: N40.1  ICD-9-CM: 600.01  2/25/2015 - Present        Essential hypertension ICD-10-CM: I10  ICD-9-CM: 401.9  2/25/2015 - Present        Asthma ICD-10-CM: J45.909  ICD-9-CM: 493.90  2/25/2015 - Present        RESOLVED: Delusional disorder ([facility] ICD-10-CM: F22  ICD-9-CM: 297.1  2/25/2015 - 6/28/2017        RESOLVED: Backache, unspecified ICD-10-CM: M54.9  ICD-9-CM: 724.5  2/25/2015 - 10/23/2017        RESOLVED: Urinary tract infection, site not specified ICD-10-CM: N39.0  ICD-9-CM: 599.0  2/25/2015 - 10/23/2017            Pontine Infarct, ischemic       Continue daily physician medical management:  Pneumonia prophylaxis- Incentive spirometer every hour while awake     Ischemic CVA; cont asa and lipitor; permissive htn     DVT risk / DVT Prophylaxis- Will require daily physician exam to assess for signs and symptoms as patient is at increased risk for of thromboembolism. Mobilization as tolerated. Intermittent pneumatic compression devices when in bed Thigh-high or knee-high thromboembolic deterrent hose when out of bed. Add subcut heparin      Pain Control: no current joint or muscle symptoms, essentially pain-free. Will require regular pain assessment and comprenhensive pain management,      Hypertension - BP uncontrolled, fluctuating, managed medically. Continue Lotensin and add prn hydralazine.  His SBP upon arrival was >220 and DBP >110   monitor closely, consider adding metoprolol; want permissive htn for cerebral perfusion but goal 120-140s  -11/10 174/86 add norvasc  - 11/11 SBP- 159 this am, improving, norvasc and lotensin begun     Diabetes mellitus - Uncontrolled. poor glycemic control. Will require daily, close FSG monitoring and medication adjustment to optimize glycemic control in setting of acute illness and hospitalization. Cont metformin and glucotrol 20mg. Add SSI. Cont carb restricted diet, accuchecks qac and qhs  -11/10 BS fine  - BG - 138, 134 this am     Urinary retention/ neurogenic bladder - schedule voids q6-8 hrs. Check post-void residual as needed; In and Out catheterize if post-void residual is more than 400 cc. Pt with hx of prostate issues . Cont flomax and proscar     bowel program - add colace and prn bowel program     GERD - resume PPI. At times may need additional antacids, Maalox prn. On omeprazole which is nonformulary, thus change to protonix 40mg bid.  -H pylori; cont Biaxin and amoxil and carafate. Will treat with dual abx for 2 weeks. Will f/u with GI post dc     Asthma/allergic rhinitis; cont flonase and zyrtec. Sleep mgmt-  will increase prn trazodone to 50mg, MR x 1    Time spent was 15 minutes with over 1/2 in direct patient care/examination, consultation and coordination of care.      Signed By: Kacy Douglas MD     November 11, 2017

## 2017-11-11 NOTE — PROGRESS NOTES
Problem: Falls - Risk of  Goal: *Absence of Falls  Document Jaiden Fall Risk and appropriate interventions in the flowsheet.    Outcome: Progressing Towards Goal  Fall Risk Interventions:  Mobility Interventions: Patient to call before getting OOB         Medication Interventions: Patient to call before getting OOB    Elimination Interventions: Call light in reach

## 2017-11-11 NOTE — PROGRESS NOTES
Care Management Interventions  PCP Verified by CM: Yes  Transition of Care Consult (CM Consult): Discharge Planning  Physical Therapy Consult: Yes  Occupational Therapy Consult: Yes  Speech Therapy Consult: Yes  Current Support Network: Lives with Spouse, Own Home, Family Lives Nearby  Plan discussed with Pt/Family/Caregiver: Yes  Freedom of Choice Offered: Yes  Discharge Location  Discharge Placement: Home with family assistance (continued therapy)    Pt admitted to rehab from [place] [address]. CVA. Pt to stroke pt was independent with self care and ambulaiton - was driving. Lives with his spouse - Homero Au [phone]. She will be available at dc to assist.  Pt has never been in rehab; has not utilized home health or outpatient therapy. Has a standard walker at home. Pt has Medicare, [employer] [place] and RX coverage. Anticipated length of stay 7 to 10 days. Role of SW discussed - will follow for dc planning.

## 2017-11-11 NOTE — PROGRESS NOTES
PHYSICAL THERAPY DAILY NOTE  Time In: 3189  Time Out: 8429  Patient Seen For: AM;Gait training; Therapeutic exercise;Transfer training    Subjective: \"I was ready to have therapy this morning\"         Objective:  Vital Signs:     Patient Vitals for the past 12 hrs:   Temp Pulse Resp BP SpO2   11/11/17 0706 97.8 °F (36.6 °C) 77 16 159/85 97 %       Pain level:  Patient had no complaints of pain during therapy this morning. Patient education:  Fall precautions reveiwed    Interdisciplinary Communication:  Coordination for morning PT with nursing and OT    Other (comment) (Fall risk)      TRANSFERS Daily Assessment   Improved safety awareness with SPT with and without use of r/walker, Transfer Type: SPT with walker  Other: toilet  Transfer Assistance : 4 (Contact guard assistance)  Sit to Stand Assistance: Contact guard assistance       GAIT Daily Assessment   Improved upright posture and use of the r/walker keeping it closer to him. Still needs verbal cues for speed control at times. Once RLE knee fatigues he has a soft knee extension that occurs at times. Amount of Assistance: 4 (Contact guard assistance)  Distance (ft): 150 Feet (ft) (100 2nd walk)  Assistive Device: Walker, rolling       STEPS or STAIRS Daily Assessment   1 step at a a time and verbal cues needed to ensure proper sequence. Steps/Stairs Ambulated (#): 8  Level of Assist : 4 (Contact guard assistance)  Rail Use: Both       BALANCE Daily Assessment    Sitting - Static: Good (unsupported)  Sitting - Dynamic: Good (unsupported)  Standing - Static: Good;Constant support  Standing - Dynamic : Impaired         LOWER EXTREMITY EXERCISES Daily Assessment   Improved timing and coordination with BLE alternating step exercise today.    Extremity: Both  Exercise Type #1: Seated lower extremity strengthening (1.5# weight used for knee ext and hip flexion)  Sets Performed: 3  Reps Performed: 10  Level of Assist: Supervision  Exercise Type #2: Standing lower extremity strengthening (Step exercise for r/l alternating up/down and step up/down)  Sets Performed: 1  Reps Performed: 10  Level of Assist: Contact guard assistance  Exercise Type #3: Seated lower extremity strengthening (NuStep x 10 minutes level 3)  Level of Assist: Supervision          Assessment: Patient making steady progress towards his goals with improved safety standing and mobility. Patient was taken back back to his room in his w/c and was assisted to the toilet for voiding. He was then assisted over into his recliner chair, positioned for comfort with his call light within reach. Plan of Care: Continue to progress as indicated.       Naun Conde [place]  11/11/2017 97

## 2017-11-12 LAB
GLUCOSE BLD STRIP.AUTO-MCNC: 121 MG/DL (ref 65–100)
GLUCOSE BLD STRIP.AUTO-MCNC: 135 MG/DL (ref 65–100)
GLUCOSE BLD STRIP.AUTO-MCNC: 140 MG/DL (ref 65–100)
GLUCOSE BLD STRIP.AUTO-MCNC: 223 MG/DL (ref 65–100)

## 2017-11-12 PROCEDURE — 82962 GLUCOSE BLOOD TEST: CPT

## 2017-11-12 PROCEDURE — 74011636637 HC RX REV CODE- 636/637: Performed by: PHYSICAL MEDICINE & REHABILITATION

## 2017-11-12 PROCEDURE — 74011250637 HC RX REV CODE- 250/637: Performed by: PHYSICAL MEDICINE & REHABILITATION

## 2017-11-12 PROCEDURE — 65310000000 HC RM PRIVATE REHAB

## 2017-11-12 RX ORDER — TRAZODONE HYDROCHLORIDE 50 MG/1
100 TABLET ORAL
Status: DISCONTINUED | OUTPATIENT
Start: 2017-11-12 | End: 2017-11-13

## 2017-11-12 RX ADMIN — INSULIN LISPRO 4 UNITS: 100 INJECTION, SOLUTION INTRAVENOUS; SUBCUTANEOUS at 12:42

## 2017-11-12 RX ADMIN — LORATADINE 10 MG: 10 TABLET ORAL at 16:25

## 2017-11-12 RX ADMIN — TRAZODONE HYDROCHLORIDE 100 MG: 50 TABLET ORAL at 22:13

## 2017-11-12 RX ADMIN — METFORMIN HYDROCHLORIDE 500 MG: 500 TABLET, FILM COATED ORAL at 12:00

## 2017-11-12 RX ADMIN — SUCRALFATE 1 G: 1 TABLET ORAL at 20:22

## 2017-11-12 RX ADMIN — FINASTERIDE 5 MG: 5 TABLET, FILM COATED ORAL at 09:03

## 2017-11-12 RX ADMIN — AMLODIPINE BESYLATE 5 MG: 5 TABLET ORAL at 09:03

## 2017-11-12 RX ADMIN — AMOXICILLIN 1000 MG: 500 CAPSULE ORAL at 09:03

## 2017-11-12 RX ADMIN — METFORMIN HYDROCHLORIDE 500 MG: 500 TABLET, FILM COATED ORAL at 08:00

## 2017-11-12 RX ADMIN — PANTOPRAZOLE SODIUM 40 MG: 40 TABLET, DELAYED RELEASE ORAL at 16:26

## 2017-11-12 RX ADMIN — FLUTICASONE PROPIONATE 2 SPRAY: 50 SPRAY, METERED NASAL at 09:08

## 2017-11-12 RX ADMIN — ASPIRIN 81 MG CHEWABLE TABLET 81 MG: 81 TABLET CHEWABLE at 09:04

## 2017-11-12 RX ADMIN — CLARITHROMYCIN 500 MG: 500 TABLET, FILM COATED ORAL at 09:03

## 2017-11-12 RX ADMIN — SUCRALFATE 1 G: 1 TABLET ORAL at 16:25

## 2017-11-12 RX ADMIN — DOCUSATE SODIUM 100 MG: 100 CAPSULE, LIQUID FILLED ORAL at 09:03

## 2017-11-12 RX ADMIN — ATORVASTATIN CALCIUM 80 MG: 40 TABLET, FILM COATED ORAL at 09:04

## 2017-11-12 RX ADMIN — CLARITHROMYCIN 500 MG: 500 TABLET, FILM COATED ORAL at 16:35

## 2017-11-12 RX ADMIN — METFORMIN HYDROCHLORIDE 500 MG: 500 TABLET, FILM COATED ORAL at 16:25

## 2017-11-12 RX ADMIN — TAMSULOSIN HYDROCHLORIDE 0.4 MG: 0.4 CAPSULE ORAL at 09:04

## 2017-11-12 RX ADMIN — GLIPIZIDE 20 MG: 5 TABLET ORAL at 09:03

## 2017-11-12 RX ADMIN — HYDROCHLOROTHIAZIDE: 12.5 CAPSULE ORAL at 09:03

## 2017-11-12 RX ADMIN — AMOXICILLIN 1000 MG: 500 CAPSULE ORAL at 20:22

## 2017-11-12 RX ADMIN — SUCRALFATE 1 G: 1 TABLET ORAL at 06:15

## 2017-11-12 RX ADMIN — PANTOPRAZOLE SODIUM 40 MG: 40 TABLET, DELAYED RELEASE ORAL at 06:15

## 2017-11-12 RX ADMIN — SUCRALFATE 1 G: 1 TABLET ORAL at 11:00

## 2017-11-12 NOTE — PROGRESS NOTES
Florencia Stern MD,   Medical Director  [address] [facility], [address] [facility]  Tel: [phone]       D PROGRESS NOTE    Beatrice Anton  Admit Date: 11/9/2017  Admit Diagnosis: Left CVA  Stroke (cerebrum) ([address]    Subjective     A bit confused. Didn't rest well last night. No complaints otherwise. \" I think I need to shave, my razor is right there. \" ( there was nothing there)    Patient seen and examined. Reports improved sleep overnight with increased trazodone dose given. Still with ongoing insomnia. + aphasic. Denies pain, lightheadedness, palpations, SOB or nausea. Participating in am therapy.     Objective:     Current Facility-Administered Medications   Medication Dose Route Frequency    traZODone (DESYREL) tablet 50 mg  50 mg Oral QHS PRN    amLODIPine (NORVASC) tablet 5 mg  5 mg Oral DAILY    alum-mag hydroxide-simeth (MYLANTA) oral suspension 30 mL  30 mL Oral Q4H PRN    magnesium hydroxide (MILK OF MAGNESIA) 400 mg/5 mL oral suspension 30 mL  30 mL Oral DAILY PRN    acetaminophen (TYLENOL) tablet 650 mg  650 mg Oral Q6H PRN    aspirin chewable tablet 81 mg  81 mg Oral DAILY    hydrALAZINE (APRESOLINE) tablet 25 mg  25 mg Oral QID PRN    docusate sodium (COLACE) capsule 100 mg  100 mg Oral DAILY    bisacodyl (DULCOLAX) suppository 10 mg  10 mg Rectal DAILY PRN    pantoprazole (PROTONIX) tablet 40 mg  40 mg Oral ACB&D    atorvastatin (LIPITOR) tablet 80 mg  80 mg Oral DAILY    amoxicillin (AMOXIL) capsule 1,000 mg  1,000 mg Oral Q12H    clarithromycin (BIAXIN) tablet 500 mg  500 mg Oral BID    finasteride (PROSCAR) tablet 5 mg  5 mg Oral DAILY    glipiZIDE (GLUCOTROL) tablet 20 mg  20 mg Oral ACB    benazepril/hydroCHLOROthiazide (LOTENSIN HCT) 10/12.5 mg   Oral DAILY    fluticasone (FLONASE) 50 mcg/actuation nasal spray 2 Spray  2 Spray Both Nostrils DAILY    loratadine (CLARITIN) tablet 10 mg  10 mg Oral QPM    sucralfate (CARAFATE) tablet 1 g  1 g Oral AC&HS    tamsulosin (FLOMAX) capsule 0.4 mg  0.4 mg Oral DAILY    metFORMIN (GLUCOPHAGE) tablet 500 mg  500 mg Oral TID WITH MEALS    insulin lispro (HUMALOG) injection   SubCUTAneous AC&HS    ondansetron (ZOFRAN ODT) tablet 4 mg  4 mg Oral Q6H PRN     Review of Systems:Denies chest pain, shortness of breath, cough, headache, visual problems, abdominal pain, dysurea, calf pain. Pertinent positives are as noted in the medical records and unremarkable otherwise. Visit Vitals    /77 (BP 1 Location: Left arm)    Pulse 80    Temp 98.8 °F (37.1 °C)    Resp 18    Ht 6' (1.829 m)    Wt 89.4 kg (197 lb)    SpO2 96%    BMI 26.72 kg/m2        Physical Exam:   General: Alert and oriented x3  No acute cardio respiratory distress. HEENT: Normocephalic,no scleral icterus  Oral mucosa moist without cyanosis; right facial weakness   Lungs: Clear to auscultation  bilaterally. Respiration even and unlabored   Heart: Regular rate and rhythm, S1, S2   No  murmurs, clicks, rub or gallops   Abdomen: Soft, non-tender, nondistended. Bowel sounds present. No organomegaly. Genitourinary: Benign . Neuromuscular:      RLE 4 to 4-  RUE 4-, dysmetric, pronator drift  Right facial weakness with noted dysarthria, EOMI   Skin/extremity: No rashes, no erythema. No calf tenderness BLE  No edema.                                                                             Functional Assessment:          Balance  Sitting - Static: Good (unsupported) (11/11/17 1100)  Sitting - Dynamic: Good (unsupported) (11/11/17 1100)  Standing - Static: Good;Constant support (11/11/17 1100)  Standing - Dynamic : Impaired (11/11/17 1100)           Toileting  Cues:  (SBA for balance and safety) (11/11/17 1231)  Adaptive Equipment: Walker;Grab bars;Elevated seat (11/11/17 1231)         Brian Crain Fall Risk Assessment:  Brian Crain Fall Risk  Mobility: Ambulates or transfers with assist devices or assistance/unsteady gait (11/11/17 2346)  Mobility Interventions: Patient to call before getting OOB (11/11/17 2346)  Mentation: Alert, oriented x 3 (11/11/17 2346)  Medication: Patient receiving anticonvulsants, sedatives(tranquilizers), psychotropics or hypnotics, hypoglycemics, narcotics, sleep aids, antihypertensives, laxatives, or diuretics (11/11/17 2346)  Medication Interventions: Patient to call before getting OOB (11/11/17 2346)  Elimination: Needs assistance with toileting (11/11/17 2346)  Elimination Interventions: Call light in reach (11/11/17 2346)  Prior Fall History: No (11/11/17 2346)  Total Score: 3 (11/11/17 2346)  Standard Fall Precautions: Yes (11/09/17 1132)  High Fall Risk: Yes (11/11/17 2346)     Speech Assessment:  Aspiration Signs/Symptoms: Delayed cough/throat clear (11/10/17 0910)      Ambulation:  Gait  Distance (ft): 150 Feet (ft) (100 2nd walk) (11/11/17 1100)  Assistive Device: Walker, rolling (11/11/17 1100)  Rail Use: Both (11/11/17 1100)     Labs/Studies:  Recent Results (from the past 72 hour(s))   GLUCOSE, POC    Collection Time: 11/09/17 11:28 AM   Result Value Ref Range    Glucose (POC) 165 (H) 65 - 100 mg/dL   GLUCOSE, POC    Collection Time: 11/09/17  3:57 PM   Result Value Ref Range    Glucose (POC) 206 (H) 65 - 100 mg/dL   GLUCOSE, POC    Collection Time: 11/09/17  8:39 PM   Result Value Ref Range    Glucose (POC) 130 (H) 65 - 100 mg/dL   GLUCOSE, POC    Collection Time: 11/10/17  1:27 AM   Result Value Ref Range    Glucose (POC) 78 65 - 100 mg/dL   GLUCOSE, POC    Collection Time: 11/10/17  6:06 AM   Result Value Ref Range    Glucose (POC) 140 (H) 65 - 100 mg/dL   GLUCOSE, POC    Collection Time: 11/10/17  7:38 AM   Result Value Ref Range    Glucose (POC) 138 (H) 65 - 100 mg/dL   CBC W/O DIFF    Collection Time: 11/10/17  8:13 AM   Result Value Ref Range    WBC 7.1 4.3 - 11.1 K/uL    RBC 5.30 4.23 - 5.67 M/uL    HGB 14.2 13.6 - 17.2 g/dL    HCT 41.0 (L) 41.1 - 50.3 %    MCV 77.4 (L) 79.6 - 97.8 FL MCH 26.8 26.1 - 32.9 PG    MCHC 34.6 31.4 - 35.0 g/dL    RDW 12.9 11.9 - 14.6 %    PLATELET 647 603 - 057 K/uL    MPV 10.1 (L) 10.8 - 63.3 FL   METABOLIC PANEL, COMPREHENSIVE    Collection Time: 11/10/17  8:13 AM   Result Value Ref Range    Sodium 140 136 - 145 mmol/L    Potassium 3.7 3.5 - 5.1 mmol/L    Chloride 102 98 - 107 mmol/L    CO2 30 21 - 32 mmol/L    Anion gap 8 7 - 16 mmol/L    Glucose 166 (H) 65 - 100 mg/dL    BUN 12 8 - 23 MG/DL    Creatinine 1.17 0.8 - 1.5 MG/DL    GFR est AA >60 >60 ml/min/1.73m2    GFR est non-AA >60 >60 ml/min/1.73m2    Calcium 8.8 8.3 - 10.4 MG/DL    Bilirubin, total 0.4 0.2 - 1.1 MG/DL    ALT (SGPT) 25 12 - 65 U/L    AST (SGOT) 13 (L) 15 - 37 U/L    Alk.  phosphatase 65 50 - 136 U/L    Protein, total 7.4 6.3 - 8.2 g/dL    Albumin 3.5 3.2 - 4.6 g/dL    Globulin 3.9 (H) 2.3 - 3.5 g/dL    A-G Ratio 0.9 (L) 1.2 - 3.5     MAGNESIUM    Collection Time: 11/10/17  8:13 AM   Result Value Ref Range    Magnesium 2.4 1.8 - 2.4 mg/dL   GLUCOSE, POC    Collection Time: 11/10/17 11:21 AM   Result Value Ref Range    Glucose (POC) 108 (H) 65 - 100 mg/dL   GLUCOSE, POC    Collection Time: 11/10/17  4:33 PM   Result Value Ref Range    Glucose (POC) 50 (L) 65 - 100 mg/dL   GLUCOSE, POC    Collection Time: 11/10/17  7:23 PM   Result Value Ref Range    Glucose (POC) 194 (H) 65 - 100 mg/dL   GLUCOSE, POC    Collection Time: 11/10/17  8:41 PM   Result Value Ref Range    Glucose (POC) 179 (H) 65 - 100 mg/dL   GLUCOSE, POC    Collection Time: 11/11/17  1:08 AM   Result Value Ref Range    Glucose (POC) 138 (H) 65 - 100 mg/dL   GLUCOSE, POC    Collection Time: 11/11/17  7:13 AM   Result Value Ref Range    Glucose (POC) 134 (H) 65 - 100 mg/dL   GLUCOSE, POC    Collection Time: 11/11/17 11:25 AM   Result Value Ref Range    Glucose (POC) 165 (H) 65 - 100 mg/dL   GLUCOSE, POC    Collection Time: 11/11/17  4:45 PM   Result Value Ref Range    Glucose (POC) 111 (H) 65 - 100 mg/dL   GLUCOSE, POC    Collection Time: 11/11/17  9:19 PM   Result Value Ref Range    Glucose (POC) 121 (H) 65 - 100 mg/dL   GLUCOSE, POC    Collection Time: 11/12/17  7:11 AM   Result Value Ref Range    Glucose (POC) 121 (H) 65 - 100 mg/dL       Assessment:     Problem List as of 11/12/2017  Date Reviewed: 10/23/2017          Codes Class Noted - Resolved    Stroke (cerebrum) ([facility] ICD-10-CM: I63.9  ICD-9-CM: 434.91  11/9/2017 - Present        Abdominal bloating ICD-10-CM: R14.0  ICD-9-CM: 787.3  10/23/2017 - Present        Nausea ICD-10-CM: R11.0  ICD-9-CM: 787.02  8/15/2017 - Present        Fatty liver ICD-10-CM: K76.0  ICD-9-CM: 571.8  6/28/2017 - Present        Lower abdominal pain ICD-10-CM: R10.30  ICD-9-CM: 789.09  6/19/2017 - Present        Neck pain ICD-10-CM: M54.2  ICD-9-CM: 723.1  12/27/2016 - Present        GERD (gastroesophageal reflux disease) ICD-10-CM: K21.9  ICD-9-CM: 530.81  6/23/2016 - Present        Type 2 diabetes mellitus ([facility] ICD-10-CM: E11.9  ICD-9-CM: 250.00  2/25/2015 - Present        Allergic rhinitis ICD-10-CM: J30.9  ICD-9-CM: 477.9  2/25/2015 - Present        HLD (hyperlipidemia) ICD-10-CM: E78.5  ICD-9-CM: 272.4  2/25/2015 - Present        Enlarged prostate with lower urinary tract symptoms (LUTS) ICD-10-CM: N40.1  ICD-9-CM: 600.01  2/25/2015 - Present        Essential hypertension ICD-10-CM: I10  ICD-9-CM: 401.9  2/25/2015 - Present        Asthma ICD-10-CM: J45.909  ICD-9-CM: 493.90  2/25/2015 - Present        RESOLVED: Delusional disorder ([address] ICD-10-CM: F22  ICD-9-CM: 297.1  2/25/2015 - 6/28/2017        RESOLVED: Backache, unspecified ICD-10-CM: M54.9  ICD-9-CM: 724.5  2/25/2015 - 10/23/2017        RESOLVED: Urinary tract infection, site not specified ICD-10-CM: N39.0  ICD-9-CM: 599.0  2/25/2015 - 10/23/2017            Pontine Infarct, ischemic       Continue daily physician medical management:  Pneumonia prophylaxis- Incentive spirometer every hour while awake     Ischemic CVA; cont asa and lipitor; permissive htn     DVT risk / DVT Prophylaxis- Will require daily physician exam to assess for signs and symptoms as patient is at increased risk for of thromboembolism. Mobilization as tolerated. Intermittent pneumatic compression devices when in bed Thigh-high or knee-high thromboembolic deterrent hose when out of bed. Add subcut heparin      Pain Control: no current joint or muscle symptoms, essentially pain-free. Will require regular pain assessment and comprenhensive pain management,      Hypertension - BP uncontrolled, fluctuating, managed medically. Continue Lotensin and add prn hydralazine. His SBP upon arrival was >220 and DBP >110   monitor closely, consider adding metoprolol; want permissive htn for cerebral perfusion but goal 120-140s  -11/10 174/86 add norvasc  - 11/11 SBP- 143 this am, continues to improve, on norvasc and lotensin     Diabetes mellitus - Uncontrolled. poor glycemic control. Will require daily, close FSG monitoring and medication adjustment to optimize glycemic control in setting of acute illness and hospitalization. Cont metformin and glucotrol 20mg. Add SSI. Cont carb restricted diet, accuchecks qac and qhs  -11/10 BS fine  - BG - 121 this am, yesterday 121- 165 range     Urinary retention/ neurogenic bladder - schedule voids q6-8 hrs. Check post-void residual as needed; In and Out catheterize if post-void residual is more than 400 cc. Pt with hx of prostate issues . Cont flomax and proscar     bowel program - add colace and prn bowel program     GERD - resume PPI. At times may need additional antacids, Maalox prn. On omeprazole which is nonformulary, thus change to protonix 40mg bid.  -H pylori; cont Biaxin and amoxil and carafate. Will treat with dual abx for 2 weeks. Will f/u with GI post dc     Asthma/allergic rhinitis; cont flonase and zyrtec.      Sleep mgmt-  will continue to increase prn trazodone to 100mg    Time spent was 15 minutes with over 1/2 in direct patient care/examination, consultation and coordination of care.      Signed By: Fabiano Murrieta MD     November 12, 2017

## 2017-11-12 NOTE — PROGRESS NOTES
Patient requested sleep medication before bed. Trazadone was given about 10:30 pm; somewhat effective. Ambulated to the bathroom several times during the night and urinated well. Hourly rounds were performed throughout the shift. All needs met at this time. Report given to oncoming nurse.

## 2017-11-13 LAB
GLUCOSE BLD STRIP.AUTO-MCNC: 107 MG/DL (ref 65–100)
GLUCOSE BLD STRIP.AUTO-MCNC: 123 MG/DL (ref 65–100)
GLUCOSE BLD STRIP.AUTO-MCNC: 124 MG/DL (ref 65–100)
GLUCOSE BLD STRIP.AUTO-MCNC: 146 MG/DL (ref 65–100)

## 2017-11-13 PROCEDURE — 74011250637 HC RX REV CODE- 250/637: Performed by: PHYSICAL MEDICINE & REHABILITATION

## 2017-11-13 PROCEDURE — 97535 SELF CARE MNGMENT TRAINING: CPT

## 2017-11-13 PROCEDURE — 97110 THERAPEUTIC EXERCISES: CPT

## 2017-11-13 PROCEDURE — 82962 GLUCOSE BLOOD TEST: CPT

## 2017-11-13 PROCEDURE — 97530 THERAPEUTIC ACTIVITIES: CPT

## 2017-11-13 PROCEDURE — 65310000000 HC RM PRIVATE REHAB

## 2017-11-13 PROCEDURE — 99232 SBSQ HOSP IP/OBS MODERATE 35: CPT | Performed by: PHYSICAL MEDICINE & REHABILITATION

## 2017-11-13 PROCEDURE — 97116 GAIT TRAINING THERAPY: CPT

## 2017-11-13 PROCEDURE — 92526 ORAL FUNCTION THERAPY: CPT

## 2017-11-13 RX ORDER — TEMAZEPAM 15 MG/1
30 CAPSULE ORAL
Status: DISCONTINUED | OUTPATIENT
Start: 2017-11-13 | End: 2017-11-21

## 2017-11-13 RX ORDER — DIPHENHYDRAMINE HCL 25 MG
25 CAPSULE ORAL
Status: DISCONTINUED | OUTPATIENT
Start: 2017-11-13 | End: 2017-11-20

## 2017-11-13 RX ADMIN — FLUTICASONE PROPIONATE 2 SPRAY: 50 SPRAY, METERED NASAL at 08:52

## 2017-11-13 RX ADMIN — TEMAZEPAM 30 MG: 15 CAPSULE ORAL at 21:58

## 2017-11-13 RX ADMIN — SUCRALFATE 1 G: 1 TABLET ORAL at 05:52

## 2017-11-13 RX ADMIN — SUCRALFATE 1 G: 1 TABLET ORAL at 12:06

## 2017-11-13 RX ADMIN — FINASTERIDE 5 MG: 5 TABLET, FILM COATED ORAL at 08:49

## 2017-11-13 RX ADMIN — ASPIRIN 81 MG CHEWABLE TABLET 81 MG: 81 TABLET CHEWABLE at 08:49

## 2017-11-13 RX ADMIN — DOCUSATE SODIUM 100 MG: 100 CAPSULE, LIQUID FILLED ORAL at 08:48

## 2017-11-13 RX ADMIN — LORATADINE 10 MG: 10 TABLET ORAL at 17:25

## 2017-11-13 RX ADMIN — AMLODIPINE BESYLATE 5 MG: 5 TABLET ORAL at 08:48

## 2017-11-13 RX ADMIN — METFORMIN HYDROCHLORIDE 500 MG: 500 TABLET, FILM COATED ORAL at 12:06

## 2017-11-13 RX ADMIN — METFORMIN HYDROCHLORIDE 500 MG: 500 TABLET, FILM COATED ORAL at 17:25

## 2017-11-13 RX ADMIN — AMOXICILLIN 1000 MG: 500 CAPSULE ORAL at 08:48

## 2017-11-13 RX ADMIN — GLIPIZIDE 20 MG: 5 TABLET ORAL at 08:48

## 2017-11-13 RX ADMIN — HYDROCHLOROTHIAZIDE: 12.5 CAPSULE ORAL at 08:49

## 2017-11-13 RX ADMIN — CLARITHROMYCIN 500 MG: 500 TABLET, FILM COATED ORAL at 08:49

## 2017-11-13 RX ADMIN — TAMSULOSIN HYDROCHLORIDE 0.4 MG: 0.4 CAPSULE ORAL at 08:49

## 2017-11-13 RX ADMIN — METFORMIN HYDROCHLORIDE 500 MG: 500 TABLET, FILM COATED ORAL at 08:49

## 2017-11-13 RX ADMIN — PANTOPRAZOLE SODIUM 40 MG: 40 TABLET, DELAYED RELEASE ORAL at 17:25

## 2017-11-13 RX ADMIN — ATORVASTATIN CALCIUM 80 MG: 40 TABLET, FILM COATED ORAL at 08:48

## 2017-11-13 RX ADMIN — SUCRALFATE 1 G: 1 TABLET ORAL at 21:58

## 2017-11-13 RX ADMIN — AMOXICILLIN 1000 MG: 500 CAPSULE ORAL at 21:58

## 2017-11-13 RX ADMIN — CLARITHROMYCIN 500 MG: 500 TABLET, FILM COATED ORAL at 17:25

## 2017-11-13 RX ADMIN — SUCRALFATE 1 G: 1 TABLET ORAL at 17:25

## 2017-11-13 RX ADMIN — PANTOPRAZOLE SODIUM 40 MG: 40 TABLET, DELAYED RELEASE ORAL at 05:52

## 2017-11-13 NOTE — PROGRESS NOTES
PHYSICAL THERAPY DAILY NOTE  Time In: 0165  Time Out: 1004  Patient Seen For: AM;Other (see progress notes);Gait training; Therapeutic exercise;Transfer training    Subjective: Patient had no complaints. Objective: No pain noted. He did say he had no sleep last night. /69. Other (comment) (Fall risk)  GROSS ASSESSMENT Daily Assessment            BED/MAT MOBILITY Daily Assessment    Supine to Sit : 0 (Not tested)       TRANSFERS Daily Assessment    Transfer Type: SPT with walker  Transfer Assistance : 4 (Contact guard assistance)  Sit to Stand Assistance: Contact guard assistance       GAIT Daily Assessment    Amount of Assistance: 4 (Contact guard assistance)  Distance (ft): 150 Feet (ft)  Assistive Device: Walker, rolling       STEPS or STAIRS Daily Assessment    Level of Assist : 0 (Not tested)       BALANCE Daily Assessment            WHEELCHAIR MOBILITY Daily Assessment            LOWER EXTREMITY EXERCISES Daily Assessment    Extremity: Both  Exercise Type #1: Supine lower extremity strengthening  Sets Performed: 1  Reps Performed: 20  Level of Assist: Supervision  Exercise Type #2: Other (comment) (standing exs with weight shifting)  Sets Performed: 2  Reps Performed: 10  Level of Assist: Contact guard assistance          Assessment: Patient making good progress but did seem tired. Plan of Care: Continue with plan of care to reach PT goals. Returned to room with call colin at reach.     Wendy Schumacher, MATT  11/13/2017

## 2017-11-13 NOTE — PROGRESS NOTES
11/13/17 1035   Time Spent With Patient   Time In 1004   Time Out 1030   Patient Seen For: AM;Oral-motor exercises; Verbal activities   Mental Status   Neurologic State Alert   Orientation Level Oriented X4   Cognition Appropriate decision making   Perception Appears intact   Perseveration No perseveration noted   Safety/Judgement Fall prevention   Pt completed oral motor exercises 2 x 10 with min cues with 80% accuracy. Pt completed dysarthria tasks with 90% intelligibility.     Farhan Laboy MA/JAJA/SLP

## 2017-11-13 NOTE — PROGRESS NOTES
11/13/17 1010   Time Spent With Patient   Time In 0700   Time Out 0745   Patient Seen For: AM;ADLs   Feeding/Eating   Feeding/Eating Assistance S   Comments setup assist to open drink containers    Grooming   Grooming Assistance  CGA   Comments CGA when standing to brush teeth    Upper Body Bathing   Bathing Assistance, Upper S   Comments setup assist    Lower Body Bathing   Bathing Assistance, Lower  SBA   Adaptive Equipment Grab bar   Position Performed Standing   Comments SBA when standing for buttocks/mena care    [address] (FIM Score) Min A  (CGA standing at toilet to urinate )   Upper Body Dressing    Dressing Assistance  S   Comments setup assist    Lower Body Dressing    Dressing Assistance  SBA   Leg Crossed Method Used No   Position Performed Seated in chair;Standing   Comments SBA during clothing management up    Functional Transfers   Toilet Transfer  Elevated seat;Grab bars;Stand pivot transfer with walker   Amount of Assistance Required CGA   Tub or Shower Type Shower   Amount of Assistance Required CGA   Adaptive Equipment Tub transfer bench;Walker (comment);Grab bars   S: \"I feel pretty good today. \"   O: Patient presented seated up in recliner. Agreeable to treatment. Patient completed ADL; see above for FIMs. Required verbal and tactile cues to extend RLE during standing to brush teeth. A: Patient is making good progress but required increased cues for RLE knee extension when standing to brush teeth. Patient tolerated session well with no complaint of pain. P: Continue OT POC with focus on ADL/IADL skills, functional transfers, functional mobility, coordination, strength, static and dynamic balance, and activity tolerance to maximize safety and independence with ADLs and functional transfers. Patient was left seated up in recliner with call bell/all other needs in reach.      Interdisciplinary communication: Collaborated with physician regarding sleep medication issues this weekend.       Tyler Castorena MS, OTR/L  11/13/2017

## 2017-11-13 NOTE — PROGRESS NOTES
PHYSICAL THERAPY DAILY NOTE  Time In: 5280  Time Out: 7305  Patient Seen For: PM;Other (see progress notes);Gait training; Therapeutic exercise;Transfer training    Subjective: Patient had no complaints. Objective: Other (comment) (Fall risk)  GROSS ASSESSMENT Daily Assessment            BED/MAT MOBILITY Daily Assessment    Supine to Sit : 0 (Not tested)  Sit to Supine : 0 (Not tested)       TRANSFERS Daily Assessment    Transfer Type: SPT with walker  Transfer Assistance : 4 (Contact guard assistance)  Sit to Stand Assistance: Contact guard assistance       GAIT Daily Assessment    Amount of Assistance: 4 (Contact guard assistance)  Distance (ft): 150 Feet (ft)  Assistive Device: Walker, rolling       STEPS or STAIRS Daily Assessment    Level of Assist : 0 (Not tested)       BALANCE Daily Assessment            WHEELCHAIR MOBILITY Daily Assessment            LOWER EXTREMITY EXERCISES Daily Assessment    Extremity: Both  Exercise Type #1: Supine lower extremity strengthening  Sets Performed: 1  Reps Performed: 20  Level of Assist: Supervision  Exercise Type #2: Other (comment) (standing exs with weight shifting)  Sets Performed: 2  Reps Performed: 10  Level of Assist: Contact guard assistance          Assessment: Patient making good progress. Plan of Care: Continue with plan of care to reach PT goals. Returned to room with call colin at [facility].     Kitty So, PTA  11/13/2017

## 2017-11-13 NOTE — PROGRESS NOTES
End Of Shift Functional Summary, Nursing      TOILETING:  Does patient need assist with clothing management and/or pericare? No      BLADDER:  Pt does not have a plunkett catheter that staff manages. Pt does take medication. Pt is continent. of bladder and voids in toilet  Pt has had 0 bladder accidents during this shift.  (An accident is when the episode is not contained in a brief AND/OR the clothing/linen requires changing/cleaning up.)    BOWEL:  Pt does take medication. Pt is continent of bowel and uses toilet. Pt has had 0 bowel accidents during this shift. (An accident is when the episode is not contained in a brief AND/OR the clothing/linen requires changing/cleaning up.)    EATING  Pt requires no assistance. Pt does not wear dentures. TUBE FEEDINGS:  Pt does not  receive nutrition through tube feedings. Patient requires no assistance with feedings. Documentation reviewed and plan of care discussed/reviewed with   oncoming nurse during the shift.

## 2017-11-13 NOTE — PROGRESS NOTES
Patient's Trazodone was increased to 100 mg today. He requested it at 10:15 pm because he was unable to sleep. At 11:15 pm, patient became confused, restless, and was trying to get OOB. His speech was slurred and he said that he felt like he was going to \"pass out\". 11:15 pm:  /57 (122/79 at 8 pm)  HR 52 monitor/63 manual (94 at 8 pm)  RR 16 (18 at 8 pm)  O2 100 (93 at 8 pm)    Patient was put back to bed and made comfortable. 12:15 am:  /73  HR 78  RR 18  O2 96    Patient continues to be wakeful and feel like he's going to \"pass out\". 3 am: Pt continues to be sedated and unsteady, yet restless, sleeping intermittently. He is concerned about his ability to participate in therapy. 5 am:  Was able to ambulate to the toilet; was steady and much less sedated; is not compliant in asking and waiting for help; resting quietly in recliner. Trazodone is not effective and patient does not want to take it again. 6 am:  Pt aspirated water several times while swallowing pills. Hourly rounds were performed throughout the shift. All needs met at this time. Report given to oncoming nurse.

## 2017-11-13 NOTE — PROGRESS NOTES
OT Daily Note  Time In 1300   Time Out 1345     Subjective: \"I had enough. Too much! \" [when asked about lunch]  Pain: none reported  Education: positioning and foot placement when standing to wash hands at sink   Interdisciplinary Communication: with PT regarding performance during ADL   Precautions: Other (comment) (fall risk)  Location on arrival: up in recliner    Self-Care Daily Assessment   Self Care Task: Toileting  Level of Assist: 5 (Supervision)  Adaptive Equipment: Elevated seat, Grab bars  Patient required toileting; see above for FIMs. Facilitated weightshift to R and correct foot positioning when patient stood at sink to wash B hands. Patient initially stood with weight mostly on LLE and with pelvis rotated to L. Continues to require cues for positioning to stand at sink to wash hands and for pelvic alignment. Coordination Daily Assessment   Patient performed simple home management task of folding clothes, including zippers and buttons with excellent quality and extra time, with focus on fine motor coordination for managing buttons and bilateral coordination. Patient stood x 3:31 and folded one pair of pants, but complained of feeling like he had \"shaky knees\" so standing task was terminated. Before sitting, facilitated weightshift to R and B feet in correct alignment. Patient completed fine motor coordination task using RUE only to  coins of various sizes and place through small slit in lidded container. Patient was able to correctly generate an amount of change without cues. Focus was on RUE coordination during functional task. Would benefit from further weightbearing through RLE and practice weightshifting to R in standing. Patient was left seated up in [facility] in gym for PT. Assessment: Very pleasant and agreeable throughout session. RUE coordination is improving.    Plan: Continue OT POC with focus on ADL/IADL skills, functional transfers, functional mobility, coordination, strength, static and dynamic balance, and activity tolerance to maximize safety and independence with ADLs and functional transfers.      Melanie Bernardo MS, OTR/L  11/13/2017

## 2017-11-13 NOTE — PROGRESS NOTES
End Of Shift Functional Summary, Nursing      TOILETING:  Does patient need assist with clothing management and/or pericare? No    TOILET TRANSFER:  Pt requires moderate assistance. Pt uses walker. BLADDER:  Pt does not have a plunkett catheter that staff manages. Pt does not take medication. Pt is continent. of bladder and voids in toilet Pt has had 0 bladder accidents during this shift  (An accident is when the episode is not contained in a brief AND/OR the clothing/linen requires changing/cleaning up.)    BOWEL:  Pt does take medication. Pt is continent of bowel and uses toilet. Pt has had 0 bowel accidents during this shift  (An accident is when the episode is not contained in a brief AND/OR the clothing/linen requires changing/cleaning up.)    BED/CHAIR TRANSFER  Pt requires moderate assistance. Patient requires the assistance of 1 staff member(s). Pt uses walker      EATING  Pt requires no assistance. Pt does not wear dentures. TUBE FEEDINGS:  Pt does not  receive nutrition through tube feedings. Documentation reviewed and plan of care discussed/reviewed with   patient assistant during the shift.

## 2017-11-13 NOTE — PROGRESS NOTES
Problem: Falls - Risk of  Goal: *Absence of Falls  Document Jaiden Fall Risk and appropriate interventions in the flowsheet.    Outcome: Progressing Towards Goal  Fall Risk Interventions:  Mobility Interventions: Patient to call before getting OOB, Strengthening exercises (ROM-active/passive), Utilize walker, cane, or other assitive device    Mentation Interventions: Bed/chair exit alarm, Door open when patient unattended, Increase mobility    Medication Interventions: Bed/chair exit alarm, Evaluate medications/consider consulting pharmacy, Patient to call before getting OOB    Elimination Interventions: Call light in reach    History of Falls Interventions: Bed/chair exit alarm, Consult care management for discharge planning, Evaluate medications/consider consulting pharmacy

## 2017-11-13 NOTE — PROGRESS NOTES
Pt up in recliner. Denies needs or concerns at this time. Alert and oriented x4. Lungs sounds clear bilaterally with respirations even and unlabored. Bowel sounds active in all quadrants. +2 pulses bilaterally in upper and lower extremities. RSW noted. Instructed to call for assistance. Call light in reach. Voiced understanding and identified call light.

## 2017-11-13 NOTE — PROGRESS NOTES
OT Daily Note  Time In 1030   Time Out 1121     Subjective: \"I think I'm doing okay. \"   Pain: none reported  Education: hand placement during transfers, foot positioning during handwashing at sink   Interdisciplinary Communication: nurse aware that patient out to therapy   Precautions: Other (comment) (fall risk)  Location on arrival: up in recliner    Transfers Daily Assessment   Patient transferred recliner <> WC using SPT with RW with CGA and minimal verbal cues. Progressing well. Coordination Daily Assessment   Patient completed fine motor coordination task completing nuts/bolts/washers x 10 sets with focus on RUE fine motor coordination and bilateral integration. Required most of session to complete 10 sets. RUE fine motor coordination is steadily improving, and patient self-corrected several times when he initially used LUE. Self-Care Daily Assessment   Self Care Task: Toileting  Level of Assist: 5 (Supervision) (SBA during clothing management )  Adaptive Equipment: Elevated seat, Grab bars   Patient required toileting; see above for FIMs. Patient required cues for R knee extension and foot and body positioning when washing hands at sink. Progressing but continues to require cues for positioning and posture in standing. Patient was left seated up in recliner with call light/all needs in reach and in no distress. Assessment: Progressing well. Plan: Continue OT POC with focus on ADL/IADL skills, functional transfers, functional mobility, coordination, strength, static and dynamic balance, and activity tolerance to maximize safety and independence with ADLs and functional transfers.      Mariam Martin MS, OTR/L  11/13/2017

## 2017-11-13 NOTE — PROGRESS NOTES
Ayah Adams MD,   Medical Director  [address] [facility], [address] [facility]  Tel: [phone]       D PROGRESS NOTE    Lucille Hoover  Admit Date: 11/9/2017  Admit Diagnosis: Left CVA; Stroke (cerebrum) (HCC)    Subjective     Up all noc again. RN reports increased trazadone made him confused and restless. Pt reports that he sleeps well at home but hasnt slept well here or a [facility].  Denies headache, nausea, b/b difficulties    Objective:     Current Facility-Administered Medications   Medication Dose Route Frequency    temazepam (RESTORIL) capsule 30 mg  30 mg Oral QHS    diphenhydrAMINE (BENADRYL) capsule 25 mg  25 mg Oral QHS PRN    amLODIPine (NORVASC) tablet 5 mg  5 mg Oral DAILY    alum-mag hydroxide-simeth (MYLANTA) oral suspension 30 mL  30 mL Oral Q4H PRN    magnesium hydroxide (MILK OF MAGNESIA) 400 mg/5 mL oral suspension 30 mL  30 mL Oral DAILY PRN    acetaminophen (TYLENOL) tablet 650 mg  650 mg Oral Q6H PRN    aspirin chewable tablet 81 mg  81 mg Oral DAILY    hydrALAZINE (APRESOLINE) tablet 25 mg  25 mg Oral QID PRN    docusate sodium (COLACE) capsule 100 mg  100 mg Oral DAILY    bisacodyl (DULCOLAX) suppository 10 mg  10 mg Rectal DAILY PRN    pantoprazole (PROTONIX) tablet 40 mg  40 mg Oral ACB&D    atorvastatin (LIPITOR) tablet 80 mg  80 mg Oral DAILY    amoxicillin (AMOXIL) capsule 1,000 mg  1,000 mg Oral Q12H    clarithromycin (BIAXIN) tablet 500 mg  500 mg Oral BID    finasteride (PROSCAR) tablet 5 mg  5 mg Oral DAILY    glipiZIDE (GLUCOTROL) tablet 20 mg  20 mg Oral ACB    benazepril/hydroCHLOROthiazide (LOTENSIN HCT) 10/12.5 mg   Oral DAILY    fluticasone (FLONASE) 50 mcg/actuation nasal spray 2 Spray  2 Spray Both Nostrils DAILY    loratadine (CLARITIN) tablet 10 mg  10 mg Oral QPM    sucralfate (CARAFATE) tablet 1 g  1 g Oral AC&HS    tamsulosin (FLOMAX) capsule 0.4 mg  0.4 mg Oral DAILY    metFORMIN (GLUCOPHAGE) tablet 500 mg  500 mg Oral TID WITH MEALS    insulin lispro (HUMALOG) injection   SubCUTAneous AC&HS    ondansetron (ZOFRAN ODT) tablet 4 mg  4 mg Oral Q6H PRN     Review of Systems:Denies chest pain, shortness of breath, cough, headache, visual problems, abdominal pain, dysurea, calf pain. Pertinent positives are as noted in the medical records and unremarkable otherwise. Visit Vitals    /68    Pulse 82    Temp 98.4 °F (36.9 °C)    Resp 16    Ht 6' (1.829 m)    Wt 197 lb (89.4 kg)    SpO2 99%    BMI 26.72 kg/m2        Physical Exam:   General: Alert and oriented to person , place , year, month and situation  No acute cardio respiratory distress. HEENT: Normocephalic,no scleral icterus  Oral mucosa moist without cyanosis; right lower facial weakness   Lungs: Clear to auscultation  bilaterally. Respiration even and unlabored   Heart: Regular rate and rhythm, S1, S2   No  murmurs, clicks, rub or gallops   Abdomen: Soft, non-tender, nondistended. Bowel sounds present. No organomegaly. Genitourinary: Benign . Neuromuscular:      Right UE 4-, dysmetria noted, + pronator drift  RLE 4 to 4- sensation intact  Dysarthric   Skin/extremity: No rashes, no erythema.  No calf tenderness BLE  No edema                                                                            Functional Assessment:          Balance  Sitting - Static: Good (unsupported) (11/11/17 1100)  Sitting - Dynamic: Good (unsupported) (11/11/17 1100)  Standing - Static: Good;Constant support (11/11/17 1100)  Standing - Dynamic : Impaired (11/11/17 1100)           Toileting  Cues:  (SBA for balance and safety) (11/11/17 1231)  Adaptive Equipment: Walker;Grab bars;Elevated seat (11/11/17 1231)         Maribel Lopez Fall Risk Assessment:  Maribel Lopez Fall Risk  Mobility: Ambulates or transfers with assist devices or assistance/unsteady gait (11/13/17 0032)  Mobility Interventions: Patient to call before getting OOB (11/13/17 1514)  Mentation: Alert, oriented x 3 (11/13/17 0032)  Medication: Patient receiving anticonvulsants, sedatives(tranquilizers), psychotropics or hypnotics, hypoglycemics, narcotics, sleep aids, antihypertensives, laxatives, or diuretics (11/13/17 0032)  Medication Interventions: Patient to call before getting OOB (11/13/17 0032)  Elimination: Needs assistance with toileting (11/13/17 0032)  Elimination Interventions: Call light in reach (11/13/17 0032)  Prior Fall History: No (11/13/17 0032)  Total Score: 3 (11/13/17 0032)  Standard Fall Precautions: Yes (11/09/17 1132)  High Fall Risk: Yes (11/13/17 0032)     Speech Assessment:  Aspiration Signs/Symptoms: Delayed cough/throat clear (11/10/17 0910)      Ambulation:  Gait  Distance (ft): 150 Feet (ft) (100 2nd walk) (11/11/17 1100)  Assistive Device: Walker, rolling (11/11/17 1100)  Rail Use: Both (11/11/17 1100)     Labs/Studies:  Recent Results (from the past 72 hour(s))   GLUCOSE, POC    Collection Time: 11/10/17 11:21 AM   Result Value Ref Range    Glucose (POC) 108 (H) 65 - 100 mg/dL   GLUCOSE, POC    Collection Time: 11/10/17  4:33 PM   Result Value Ref Range    Glucose (POC) 50 (L) 65 - 100 mg/dL   GLUCOSE, POC    Collection Time: 11/10/17  7:23 PM   Result Value Ref Range    Glucose (POC) 194 (H) 65 - 100 mg/dL   GLUCOSE, POC    Collection Time: 11/10/17  8:41 PM   Result Value Ref Range    Glucose (POC) 179 (H) 65 - 100 mg/dL   GLUCOSE, POC    Collection Time: 11/11/17  1:08 AM   Result Value Ref Range    Glucose (POC) 138 (H) 65 - 100 mg/dL   GLUCOSE, POC    Collection Time: 11/11/17  7:13 AM   Result Value Ref Range    Glucose (POC) 134 (H) 65 - 100 mg/dL   GLUCOSE, POC    Collection Time: 11/11/17 11:25 AM   Result Value Ref Range    Glucose (POC) 165 (H) 65 - 100 mg/dL   GLUCOSE, POC    Collection Time: 11/11/17  4:45 PM   Result Value Ref Range    Glucose (POC) 111 (H) 65 - 100 mg/dL   GLUCOSE, POC    Collection Time: 11/11/17  9:19 PM   Result Value Ref Range    Glucose (POC) 121 (H) 65 - 100 mg/dL   GLUCOSE, POC    Collection Time: 11/12/17  7:11 AM   Result Value Ref Range    Glucose (POC) 121 (H) 65 - 100 mg/dL   GLUCOSE, POC    Collection Time: 11/12/17 11:22 AM   Result Value Ref Range    Glucose (POC) 223 (H) 65 - 100 mg/dL   GLUCOSE, POC    Collection Time: 11/12/17  4:21 PM   Result Value Ref Range    Glucose (POC) 135 (H) 65 - 100 mg/dL   GLUCOSE, POC    Collection Time: 11/12/17  8:29 PM   Result Value Ref Range    Glucose (POC) 140 (H) 65 - 100 mg/dL   GLUCOSE, POC    Collection Time: 11/13/17  7:36 AM   Result Value Ref Range    Glucose (POC) 124 (H) 65 - 100 mg/dL       Assessment:     Problem List as of 11/13/2017  Date Reviewed: 10/23/2017          Codes Class Noted - Resolved    Stroke (cerebrum) ([facility] ICD-10-CM: I63.9  ICD-9-CM: 434.91  11/9/2017 - Present        Abdominal bloating ICD-10-CM: R14.0  ICD-9-CM: 787.3  10/23/2017 - Present        Nausea ICD-10-CM: R11.0  ICD-9-CM: 787.02  8/15/2017 - Present        Fatty liver ICD-10-CM: K76.0  ICD-9-CM: 571.8  6/28/2017 - Present        Lower abdominal pain ICD-10-CM: R10.30  ICD-9-CM: 789.09  6/19/2017 - Present        Neck pain ICD-10-CM: M54.2  ICD-9-CM: 723.1  12/27/2016 - Present        GERD (gastroesophageal reflux disease) ICD-10-CM: K21.9  ICD-9-CM: 530.81  6/23/2016 - Present        Type 2 diabetes mellitus ([facility].) ICD-10-CM: E11.9  ICD-9-CM: 250.00  2/25/2015 - Present        Allergic rhinitis ICD-10-CM: J30.9  ICD-9-CM: 477.9  2/25/2015 - Present        HLD (hyperlipidemia) ICD-10-CM: E78.5  ICD-9-CM: 272.4  2/25/2015 - Present        Enlarged prostate with lower urinary tract symptoms (LUTS) ICD-10-CM: N40.1  ICD-9-CM: 600.01  2/25/2015 - Present        Essential hypertension ICD-10-CM: I10  ICD-9-CM: 401.9  2/25/2015 - Present        Asthma ICD-10-CM: J45.909  ICD-9-CM: 493.90  2/25/2015 - Present        RESOLVED: Delusional disorder ([facility] ICD-10-CM: F22  ICD-9-CM: 297.1  2/25/2015 - 6/28/2017        RESOLVED: Backache, unspecified ICD-10-CM: M54.9  ICD-9-CM: 724.5  2/25/2015 - 10/23/2017        RESOLVED: Urinary tract infection, site not specified ICD-10-CM: N39.0  ICD-9-CM: 599.0  2/25/2015 - 10/23/2017            Pontine Infarct, ischemic         Continue daily physician medical management:  Pneumonia prophylaxis- Incentive spirometer every hour while awake      Ischemic CVA; cont asa and lipitor; permissive htn      DVT risk / DVT Prophylaxis- Will require daily physician exam to assess for signs and symptoms as patient is at increased risk for of thromboembolism. Mobilization as tolerated. Intermittent pneumatic compression devices when in bed Thigh-high or knee-high thromboembolic deterrent hose when out of bed. Add subcut heparin       Pain Control: no current joint or muscle symptoms, essentially pain-free. Will require regular pain assessment and comprenhensive pain management,       Hypertension - BP uncontrolled, fluctuating, managed medically. Continue Lotensin and add prn hydralazine. His SBP upon arrival was >220 and DBP >110   monitor closely, consider adding metoprolol; want permissive htn for cerebral perfusion but goal 120-140s  -11/10 174/86 add norvasc  - 11/11 SBP- 143 this am, continues to improve, on norvasc and lotensin      Diabetes mellitus - Uncontrolled. poor glycemic control. Will require daily, close FSG monitoring and medication adjustment to optimize glycemic control in setting of acute illness and hospitalization. Cont metformin and glucotrol 20mg. Add SSI. Cont carb restricted diet, accuchecks qac and qhs  -11/10 BS fine  - BG - 121 this am, yesterday 121- 165 range      Urinary retention/ neurogenic bladder - schedule voids q6-8 hrs. Check post-void residual as needed; In and Out catheterize if post-void residual is more than 400 cc. Pt with hx of prostate issues .  Cont flomax and proscar      bowel program - add colace and prn bowel program      GERD - resume PPI. At times may need additional antacids, Maalox prn. On omeprazole which is nonformulary, thus change to protonix 40mg bid.  -H pylori; cont Biaxin and amoxil and carafate. Will treat with dual abx for 2 weeks. Will f/u with GI post dc      Asthma/allergic rhinitis; cont flonase and zyrtec.      Sleep mgmt-  will continue to increase prn trazodone to 100mg  -11/13 had difficult noc with restlessness, confusion; dc trazadone and try benadryl and restoril; monitor for confusion; could consider remeron         Time spent was 25 minutes with over 1/2 in direct patient care/examination, consultation and coordination of care.      Signed By: Lavon Mckeon MD     November 13, 2017

## 2017-11-14 LAB
GLUCOSE BLD STRIP.AUTO-MCNC: 148 MG/DL (ref 65–100)
GLUCOSE BLD STRIP.AUTO-MCNC: 44 MG/DL (ref 65–100)
GLUCOSE BLD STRIP.AUTO-MCNC: 79 MG/DL (ref 65–100)
GLUCOSE BLD STRIP.AUTO-MCNC: 79 MG/DL (ref 65–100)
GLUCOSE BLD STRIP.AUTO-MCNC: 94 MG/DL (ref 65–100)

## 2017-11-14 PROCEDURE — 97530 THERAPEUTIC ACTIVITIES: CPT

## 2017-11-14 PROCEDURE — 92507 TX SP LANG VOICE COMM INDIV: CPT

## 2017-11-14 PROCEDURE — 82962 GLUCOSE BLOOD TEST: CPT

## 2017-11-14 PROCEDURE — 65310000000 HC RM PRIVATE REHAB

## 2017-11-14 PROCEDURE — 74011250637 HC RX REV CODE- 250/637: Performed by: PHYSICAL MEDICINE & REHABILITATION

## 2017-11-14 PROCEDURE — 97535 SELF CARE MNGMENT TRAINING: CPT

## 2017-11-14 PROCEDURE — 97116 GAIT TRAINING THERAPY: CPT

## 2017-11-14 PROCEDURE — 97110 THERAPEUTIC EXERCISES: CPT

## 2017-11-14 PROCEDURE — 99232 SBSQ HOSP IP/OBS MODERATE 35: CPT | Performed by: PHYSICAL MEDICINE & REHABILITATION

## 2017-11-14 RX ADMIN — METFORMIN HYDROCHLORIDE 500 MG: 500 TABLET, FILM COATED ORAL at 17:31

## 2017-11-14 RX ADMIN — LORATADINE 10 MG: 10 TABLET ORAL at 17:31

## 2017-11-14 RX ADMIN — PANTOPRAZOLE SODIUM 40 MG: 40 TABLET, DELAYED RELEASE ORAL at 17:31

## 2017-11-14 RX ADMIN — FLUTICASONE PROPIONATE 2 SPRAY: 50 SPRAY, METERED NASAL at 08:17

## 2017-11-14 RX ADMIN — GLIPIZIDE 20 MG: 5 TABLET ORAL at 08:16

## 2017-11-14 RX ADMIN — ATORVASTATIN CALCIUM 80 MG: 40 TABLET, FILM COATED ORAL at 08:14

## 2017-11-14 RX ADMIN — PANTOPRAZOLE SODIUM 40 MG: 40 TABLET, DELAYED RELEASE ORAL at 05:17

## 2017-11-14 RX ADMIN — HYDROCHLOROTHIAZIDE: 12.5 CAPSULE ORAL at 08:14

## 2017-11-14 RX ADMIN — TEMAZEPAM 30 MG: 15 CAPSULE ORAL at 22:16

## 2017-11-14 RX ADMIN — DOCUSATE SODIUM 100 MG: 100 CAPSULE, LIQUID FILLED ORAL at 08:15

## 2017-11-14 RX ADMIN — METFORMIN HYDROCHLORIDE 500 MG: 500 TABLET, FILM COATED ORAL at 13:02

## 2017-11-14 RX ADMIN — TAMSULOSIN HYDROCHLORIDE 0.4 MG: 0.4 CAPSULE ORAL at 08:14

## 2017-11-14 RX ADMIN — FINASTERIDE 5 MG: 5 TABLET, FILM COATED ORAL at 08:15

## 2017-11-14 RX ADMIN — AMOXICILLIN 1000 MG: 500 CAPSULE ORAL at 08:16

## 2017-11-14 RX ADMIN — CLARITHROMYCIN 500 MG: 500 TABLET, FILM COATED ORAL at 17:31

## 2017-11-14 RX ADMIN — SUCRALFATE 1 G: 1 TABLET ORAL at 20:44

## 2017-11-14 RX ADMIN — SUCRALFATE 1 G: 1 TABLET ORAL at 17:31

## 2017-11-14 RX ADMIN — AMOXICILLIN 1000 MG: 500 CAPSULE ORAL at 20:43

## 2017-11-14 RX ADMIN — SUCRALFATE 1 G: 1 TABLET ORAL at 13:02

## 2017-11-14 RX ADMIN — METFORMIN HYDROCHLORIDE 500 MG: 500 TABLET, FILM COATED ORAL at 08:16

## 2017-11-14 RX ADMIN — CLARITHROMYCIN 500 MG: 500 TABLET, FILM COATED ORAL at 08:14

## 2017-11-14 RX ADMIN — SUCRALFATE 1 G: 1 TABLET ORAL at 05:17

## 2017-11-14 RX ADMIN — ASPIRIN 81 MG CHEWABLE TABLET 81 MG: 81 TABLET CHEWABLE at 08:15

## 2017-11-14 RX ADMIN — AMLODIPINE BESYLATE 5 MG: 5 TABLET ORAL at 08:16

## 2017-11-14 NOTE — PROGRESS NOTES
11/14/17 1007   Time Spent With Patient   Time In 0920   Time Out 1000   Patient Seen For: AM;Neuro-linguistics   Mental Status   Neurologic State Alert   Orientation Level Oriented X4   Cognition Impaired decision making   Perception Appears intact   Perseveration No perseveration noted   Safety/Judgement Fall prevention   Pt completed subtest 1, 2 and 4 of the PADMINI with increased time and min cues with 85% Accuracy. Subtest 3 and 5 not assessed secondary to dominate hand impaired from CVA.    Malu Cabral MA/JAJA/SLP

## 2017-11-14 NOTE — PROGRESS NOTES
End Of Shift Functional Summary, Nursing      TOILETING:  Does patient need assist with clothing management and/or pericare? yes    TOILET TRANSFER:  Pt requires moderate assistance. Pt uses wheelchair when he has sleep medication and uses the walker on other occasions. BLADDER:  Pt does not have a plunkett catheter that staff manages. Pt does take medication. Pt is continent. of bladder and voids in toilet  Pt requires staff to position device Pt has had 0 bladder accidents during this shift requiring moderate assistance to clean up. (An accident is when the episode is not contained in a brief AND/OR the clothing/linen requires changing/cleaning up.)    BOWEL:  Pt does take medication. Pt is continent of bowel and uses toilet. Pt requires staff to position device    Pt has had 0 bowel accidents during this shift requiring moderate assistance from staff to clean up. (An accident is when the episode is not contained in a brief AND/OR the clothing/linen requires changing/cleaning up.)    BED/CHAIR TRANSFER  Pt requires moderate assistance. Patient requires the assistance of one staff member(s). Pt uses walker and wheelchair.

## 2017-11-14 NOTE — PROGRESS NOTES
Pt sitting in recliner talking on phone. Pt denies pain, respiration even and non labored. Call light with in reach.

## 2017-11-14 NOTE — PROGRESS NOTES
Low BS 44, pt given peanut butter crackers, pudding, and orange juice. Pt BS elevated to 79. Pt ate dinner, and family at bedside.

## 2017-11-14 NOTE — PROGRESS NOTES
Subjective \"I am doing alright. \"   Activity Table hockey & chinese checkers with the use of his RUE   Strength/Endurance Patient tolerated the session with extra time needed but was able to complete the tasks well. Balance NT   Social Interaction Patient was friendly and cooperative during the session. Cognitive A&O X4   Comments Patient was handed over to Robert foley PT at the end of the session.      Kika Kenney, SHANAES

## 2017-11-14 NOTE — PROGRESS NOTES
MD Julio,   Medical Director  [address] [facility], [address] [facility]  Tel: [phone]       D PROGRESS NOTE    Virginia Dexter  Admit Date: 11/9/2017  Admit Diagnosis: Left CVA; Stroke (cerebrum) (HCC)    Subjective     Enjoys therapies. Thinks he slept better. No physical complaints.  Doesn't want to be woken up at noc    Objective:     Current Facility-Administered Medications   Medication Dose Route Frequency    temazepam (RESTORIL) capsule 30 mg  30 mg Oral QHS    diphenhydrAMINE (BENADRYL) capsule 25 mg  25 mg Oral QHS PRN    amLODIPine (NORVASC) tablet 5 mg  5 mg Oral DAILY    alum-mag hydroxide-simeth (MYLANTA) oral suspension 30 mL  30 mL Oral Q4H PRN    magnesium hydroxide (MILK OF MAGNESIA) 400 mg/5 mL oral suspension 30 mL  30 mL Oral DAILY PRN    acetaminophen (TYLENOL) tablet 650 mg  650 mg Oral Q6H PRN    aspirin chewable tablet 81 mg  81 mg Oral DAILY    hydrALAZINE (APRESOLINE) tablet 25 mg  25 mg Oral QID PRN    docusate sodium (COLACE) capsule 100 mg  100 mg Oral DAILY    bisacodyl (DULCOLAX) suppository 10 mg  10 mg Rectal DAILY PRN    pantoprazole (PROTONIX) tablet 40 mg  40 mg Oral ACB&D    atorvastatin (LIPITOR) tablet 80 mg  80 mg Oral DAILY    amoxicillin (AMOXIL) capsule 1,000 mg  1,000 mg Oral Q12H    clarithromycin (BIAXIN) tablet 500 mg  500 mg Oral BID    finasteride (PROSCAR) tablet 5 mg  5 mg Oral DAILY    glipiZIDE (GLUCOTROL) tablet 20 mg  20 mg Oral ACB    benazepril/hydroCHLOROthiazide (LOTENSIN HCT) 10/12.5 mg   Oral DAILY    fluticasone (FLONASE) 50 mcg/actuation nasal spray 2 Spray  2 Spray Both Nostrils DAILY    loratadine (CLARITIN) tablet 10 mg  10 mg Oral QPM    sucralfate (CARAFATE) tablet 1 g  1 g Oral AC&HS    tamsulosin (FLOMAX) capsule 0.4 mg  0.4 mg Oral DAILY    metFORMIN (GLUCOPHAGE) tablet 500 mg  500 mg Oral TID WITH MEALS    insulin lispro (HUMALOG) injection   SubCUTAneous AC&HS    ondansetron (ZOFRAN ODT) tablet 4 mg  4 mg Oral Q6H PRN     Review of Systems:Denies chest pain, shortness of breath, cough, headache, visual problems, abdominal pain, dysurea, calf pain. Pertinent positives are as noted in the medical records and unremarkable otherwise. Visit Vitals    /71    Pulse 93    Temp 97.7 °F (36.5 °C)    Resp 16    Ht 6' (1.829 m)    Wt 197 lb (89.4 kg)    SpO2 96%    BMI 26.72 kg/m2        Physical Exam:   General: Alert , Ox3 but with vcs. \"I get confused when I first wake up\"  No acute cardio respiratory distress. HEENT: Normocephalic,no scleral icterus; right facial weakness  Oral mucosa moist without cyanosis   Lungs: Clear to auscultation  bilaterally. Respiration even and unlabored   Heart: Regular rate and rhythm, S1, S2   No  murmurs, clicks, rub or gallops   Abdomen: Soft, non-tender, nondistended. Bowel sounds present. No organomegaly. Genitourinary: Benign . Neuromuscular:      Right UE 4-, dysmetria noted, + pronator drift  RLE 4 to 4- sensation intact  Dysarthric; O x2 this a.m. Skin/extremity: No rashes, no erythema.  No calf tenderness BLE  No edema                                                                            Functional Assessment:          Balance  Sitting - Static: Good (unsupported) (11/11/17 1100)  Sitting - Dynamic: Good (unsupported) (11/11/17 1100)  Standing - Static: Good;Constant support (11/11/17 1100)  Standing - Dynamic : Impaired (11/11/17 1100)           Toileting  Cues:  (SBA for balance and safety) (11/11/17 1231)  Adaptive Equipment: Elevated seat;Grab bars (11/13/17 1353)         Latonya Hagen Fall Risk Assessment:  Latonya Hagen Fall Risk  Mobility: Ambulates or transfers with assist devices or assistance/unsteady gait (11/14/17 0000)  Mobility Interventions: Patient to call before getting OOB;Utilize walker, cane, or other assitive device (11/14/17 0000)  Mentation: Alert, oriented x 3 (11/14/17 0000)  Mentation Interventions: Bed/chair exit alarm (11/14/17 0000)  Medication: Patient receiving anticonvulsants, sedatives(tranquilizers), psychotropics or hypnotics, hypoglycemics, narcotics, sleep aids, antihypertensives, laxatives, or diuretics (11/14/17 0000)  Medication Interventions: Bed/chair exit alarm (11/14/17 0000)  Elimination: Needs assistance with toileting (11/14/17 0000)  Elimination Interventions: Call light in reach (11/14/17 0000)  Prior Fall History: No (11/14/17 0000)  History of Falls Interventions: Bed/chair exit alarm (11/14/17 0000)  Total Score: 3 (11/14/17 0000)  Standard Fall Precautions: Yes (11/09/17 1132)  High Fall Risk: Yes (11/14/17 0000)     Speech Assessment:  Aspiration Signs/Symptoms: Delayed cough/throat clear (11/10/17 0910)      Ambulation:  Gait  Distance (ft): 150 Feet (ft) (11/13/17 1609)  Assistive Device: Walker, rolling (11/13/17 1609)  Rail Use: Both (11/11/17 1100)     Labs/Studies:  Recent Results (from the past 72 hour(s))   GLUCOSE, POC    Collection Time: 11/11/17 11:25 AM   Result Value Ref Range    Glucose (POC) 165 (H) 65 - 100 mg/dL   GLUCOSE, POC    Collection Time: 11/11/17  4:45 PM   Result Value Ref Range    Glucose (POC) 111 (H) 65 - 100 mg/dL   GLUCOSE, POC    Collection Time: 11/11/17  9:19 PM   Result Value Ref Range    Glucose (POC) 121 (H) 65 - 100 mg/dL   GLUCOSE, POC    Collection Time: 11/12/17  7:11 AM   Result Value Ref Range    Glucose (POC) 121 (H) 65 - 100 mg/dL   GLUCOSE, POC    Collection Time: 11/12/17 11:22 AM   Result Value Ref Range    Glucose (POC) 223 (H) 65 - 100 mg/dL   GLUCOSE, POC    Collection Time: 11/12/17  4:21 PM   Result Value Ref Range    Glucose (POC) 135 (H) 65 - 100 mg/dL   GLUCOSE, POC    Collection Time: 11/12/17  8:29 PM   Result Value Ref Range    Glucose (POC) 140 (H) 65 - 100 mg/dL   GLUCOSE, POC    Collection Time: 11/13/17  7:36 AM   Result Value Ref Range    Glucose (POC) 124 (H) 65 - 100 mg/dL   GLUCOSE, POC    Collection Time: 11/13/17 11:37 AM   Result Value Ref Range    Glucose (POC) 146 (H) 65 - 100 mg/dL   GLUCOSE, POC    Collection Time: 11/13/17  4:22 PM   Result Value Ref Range    Glucose (POC) 123 (H) 65 - 100 mg/dL   GLUCOSE, POC    Collection Time: 11/13/17  8:27 PM   Result Value Ref Range    Glucose (POC) 107 (H) 65 - 100 mg/dL       Assessment:     Problem List as of 11/14/2017  Date Reviewed: 10/23/2017          Codes Class Noted - Resolved    Stroke (cerebrum) ([facility] ICD-10-CM: I63.9  ICD-9-CM: 434.91  11/9/2017 - Present        Abdominal bloating ICD-10-CM: R14.0  ICD-9-CM: 787.3  10/23/2017 - Present        Nausea ICD-10-CM: R11.0  ICD-9-CM: 787.02  8/15/2017 - Present        Fatty liver ICD-10-CM: K76.0  ICD-9-CM: 571.8  6/28/2017 - Present        Lower abdominal pain ICD-10-CM: R10.30  ICD-9-CM: 789.09  6/19/2017 - Present        Neck pain ICD-10-CM: M54.2  ICD-9-CM: 723.1  12/27/2016 - Present        GERD (gastroesophageal reflux disease) ICD-10-CM: K21.9  ICD-9-CM: 530.81  6/23/2016 - Present        Type 2 diabetes mellitus ([facility] ICD-10-CM: E11.9  ICD-9-CM: 250.00  2/25/2015 - Present        Allergic rhinitis ICD-10-CM: J30.9  ICD-9-CM: 477.9  2/25/2015 - Present        HLD (hyperlipidemia) ICD-10-CM: E78.5  ICD-9-CM: 272.4  2/25/2015 - Present        Enlarged prostate with lower urinary tract symptoms (LUTS) ICD-10-CM: N40.1  ICD-9-CM: 600.01  2/25/2015 - Present        Essential hypertension ICD-10-CM: I10  ICD-9-CM: 401.9  2/25/2015 - Present        Asthma ICD-10-CM: J45.909  ICD-9-CM: 493.90  2/25/2015 - Present        RESOLVED: Delusional disorder ([facility] ICD-10-CM: F22  ICD-9-CM: 297.1  2/25/2015 - 6/28/2017        RESOLVED: Backache, unspecified ICD-10-CM: M54.9  ICD-9-CM: 724.5  2/25/2015 - 10/23/2017        RESOLVED: Urinary tract infection, site not specified ICD-10-CM: N39.0  ICD-9-CM: 599.0  2/25/2015 - 10/23/2017            Pontine Infarct, ischemic          Continue daily physician medical management:  Pneumonia prophylaxis- Incentive spirometer every hour while awake      Ischemic CVA; cont asa and lipitor; permissive htn      DVT risk / DVT Prophylaxis- Will require daily physician exam to assess for signs and symptoms as patient is at increased risk for of thromboembolism. Mobilization as tolerated. Intermittent pneumatic compression devices when in bed Thigh-high or knee-high thromboembolic deterrent hose when out of bed. Add subcut heparin       Pain Control: no current joint or muscle symptoms, essentially pain-free. Will require regular pain assessment and comprenhensive pain management,       Hypertension - BP uncontrolled, fluctuating, managed medically. Continue Lotensin and add prn hydralazine. His SBP upon arrival was >220 and DBP >110   monitor closely, consider adding metoprolol; want permissive htn for cerebral perfusion but goal 120-140s  -11/10 174/86 add norvasc  - 11/11 SBP- 143 this am, continues to improve, on norvasc and lotensin      Diabetes mellitus - Uncontrolled. poor glycemic control. Will require daily, close FSG monitoring and medication adjustment to optimize glycemic control in setting of acute illness and hospitalization. Cont metformin and glucotrol 20mg. Add SSI. Cont carb restricted diet, accuchecks qac and qhs  -11/10 BS fine  - BG - 121 this am, yesterday 121- 165 range  -11/14 consider changing to bid bs      Urinary retention/ neurogenic bladder - schedule voids q6-8 hrs. Check post-void residual as needed; In and Out catheterize if post-void residual is more than 400 cc. Pt with hx of prostate issues . Cont flomax and proscar      bowel program - add colace and prn bowel program      GERD - resume PPI. At times may need additional antacids, Maalox prn. On omeprazole which is nonformulary, thus change to protonix 40mg bid.  -H pylori; cont Biaxin and amoxil and carafate. Will treat with dual abx for 2 weeks.  Will f/u with GI post dc  - no gi complaints    Asthma/allergic rhinitis; cont flonase and zyrtec.       Sleep mgmt-  will continue to increase prn trazodone to 100mg  -11/13 had difficult noc with restlessness, confusion; dc trazadone and try benadryl and restoril; monitor for confusion; could consider remeron  -11/14 a bit better noc            Time spent was 25 minutes with over 1/2 in direct patient care/examination, consultation and coordination of care.      Signed By: Nubia Hinton MD     November 14, 2017

## 2017-11-14 NOTE — PROGRESS NOTES
Restoril 30 mg given at 2158 po for sleep. Pt sleeping in recliner per pt request. Call bell within reach. 0100: When asked pt if he wanted to return to bed he asked to  get up to void. Pt very unsteady on feet. Transported to  via  to void. Returned to bed without incident. He states he did not know where he was when first asked if he would like to go back to bed.

## 2017-11-14 NOTE — PROGRESS NOTES
11/14/17 1009   Time Spent With Patient   Time In 0830   Time Out 0913   Patient Seen For: AM;ADLs   Grooming   Grooming Assistance  CGA   Comments verbal and tactile cues to stand upright during toothbrushing    Upper Body Bathing   Bathing Assistance, Upper S   Upper Body  Training to use affected extremity as a fine motor assistance   Lower Body Bathing   Bathing Assistance, Lower  SBA   Adaptive Equipment Grab bar   Position Performed Standing; Other (comment)  (seated on tub transfer bench )   Adaptive Equipment Tub bench   Comments verbal cue to use grab bar    Toileting   Toileting Assistance (FIM Score) S  (close SBA when standing to urinate )   Upper Body Dressing    Dressing Assistance  S   Comments setup assist    Lower Body Dressing    Dressing Assistance  SBA   Position Performed Seated in chair;Standing   Comments SBA when standing for clothing management up    Functional Transfers   Toilet Transfer  Elevated seat;Grab bars;Stand pivot transfer with walker   Amount of Assistance Required CGA   Tub or Shower Type Shower   Amount of Assistance Required CGA   Adaptive Equipment Tub transfer bench;Walker (comment);Grab bars   S: \"I was a little off this morning but I feel good now. \"   O: Patient presented seated up in recliner. Agreeable to treatment. Patient completed ADL; see above for FIMs. Required minimal cues for safety when standing. Continues to require cues to stand upright at sink when brushing teeth. A: Patient is making good progress. Patient tolerated session well with no complaint of pain. P: Continue OT POC with focus on ADL/IADL skills, functional transfers, functional mobility, coordination, strength, static and dynamic balance, and activity tolerance to maximize safety and independence with ADLs and functional transfers. Patient was left seated up in recliner with call bell/all other needs in reach.      Interdisciplinary communication: Collaborated with nurse regarding patient completing ADLs.      Georgina Patel MS, OTR/L  11/14/2017

## 2017-11-14 NOTE — PROGRESS NOTES
PHYSICAL THERAPY DAILY NOTE  Time In: 1139  Time Out: 5091  Patient Seen For: PM;Other (see progress notes);Gait training; Therapeutic exercise;Transfer training    Subjective: Patient had no complaints. Objective: No pain noted. Other (comment) (Fall risk)  GROSS ASSESSMENT Daily Assessment            BED/MAT MOBILITY Daily Assessment    Supine to Sit : 0 (Not tested)       TRANSFERS Daily Assessment    Transfer Type: SPT with walker  Transfer Assistance : 4 (Contact guard assistance)  Sit to Stand Assistance: Contact guard assistance  Car Transfers: Not tested       GAIT Daily Assessment    Amount of Assistance: 4 (Contact guard assistance)  Distance (ft): 150 Feet (ft)  Assistive Device: Walker, rolling;Gait belt       STEPS or STAIRS Daily Assessment    Level of Assist : 0 (Not tested)       BALANCE Daily Assessment            WHEELCHAIR MOBILITY Daily Assessment            LOWER EXTREMITY EXERCISES Daily Assessment    Extremity: Both  Exercise Type #1: Other (comment) (nustep x 10 minutes)  Sets Performed: 1  Reps Performed: 10  Level of Assist: Supervision          Assessment: Patient making good progress. Plan of Care: Continue with plan of care to reach PT goals. Returned to room with call colin at reach.     Mateus Barakat, PTA  11/14/2017

## 2017-11-14 NOTE — PROGRESS NOTES
PHYSICAL THERAPY DAILY NOTE  Time In: 1031  Time Out: 1116  Patient Seen For: AM;Transfer training; Therapeutic exercise;Gait training; Other (see progress notes)    Subjective: Patient had no complaints. \" I slept better last night. \"         Objective: Other (comment) (Fall risk)  GROSS ASSESSMENT Daily Assessment            BED/MAT MOBILITY Daily Assessment    Supine to Sit : 0 (Not tested)       TRANSFERS Daily Assessment    Transfer Type: SPT with walker  Transfer Assistance : 4 (Contact guard assistance)  Sit to Stand Assistance: Contact guard assistance  Car Transfers: Not tested       GAIT Daily Assessment    Amount of Assistance: 4 (Contact guard assistance)  Distance (ft): 150 Feet (ft)  Assistive Device: Walker, rolling;Gait belt       STEPS or STAIRS Daily Assessment    Level of Assist : 0 (Not tested)       BALANCE Daily Assessment            WHEELCHAIR MOBILITY Daily Assessment            LOWER EXTREMITY EXERCISES Daily Assessment    Extremity: Both  Exercise Type #1: Other (comment) (standing balance exs and weight shifting )  Sets Performed: 2  Reps Performed: 10  Level of Assist: Supervision          Assessment: Patient making good progress. He seems to feel much better today. Plan of Care: Continue with plan of care to reach PT goals. Returned to room with call colin at [facility].     Gisell Meraz, PTA  11/14/2017

## 2017-11-14 NOTE — PROGRESS NOTES
.End Of Shift Functional Summary, Nursing      TOILETING:  Does patient need assist with clothing management and/or pericare? Yes: Comment: SBA with walker     TOILET TRANSFER:  Pt requires standby assistance/setup. Pt uses walker. BLADDER:  Pt does not have a plunkett catheter that staff manages. Pt does take medication. Pt is continent. of bladder and voids in toilet   Pt has had 0 bladder accidents during this shift .  (An accident is when the episode is not contained in a brief AND/OR the clothing/linen requires changing/cleaning up.)    BOWEL:  Pt does take medication. Pt is continent of bowel and uses toilet. Pt has had 0 bowel accidents during this shift . (An accident is when the episode is not contained in a brief AND/OR the clothing/linen requires changing/cleaning up.)    BED/CHAIR TRANSFER  Pt requires minimal assistance. Patient requires the assistance of 1 staff member(s). Pt uses walker      EATING  Pt requires no assistance. Pt wears dentures. Pt does not  receive nutrition through tube feedings. Patient requires no assistance with feedings. Documentation reviewed and plan of care discussed/reviewed with   patient during the shift.

## 2017-11-14 NOTE — PROGRESS NOTES
Assessment completed, pt A/O x 4, he denies pain, and shortness of breath. Pt is on RA. Pt denies cough, lungs clear, bowel sounds active in all quadrants. Pt walks with walker, SBA.  VVS and no fever    Pt performs all 3 requirements. Pt takes medication whole with sips of water. Pt encourage to call for  Any needs. Call light with in reach.

## 2017-11-14 NOTE — PROGRESS NOTES
11/14/17 1354   Time Spent With Patient   Time In 1314   Time Out 1345   Patient Seen For: PM;Other (see progress notes); ADLs   Toileting   Toileting Assistance (FIM Score) S   Adaptive Equipment Walker   Functional Transfers   Toilet Transfer  Elevated seat;Grab bars;Stand pivot transfer with walker   Amount of Assistance Required SBA   S: \"I need to go to the bathroom first.\"   O: [facility] to [facility]. During transfer from [facility] to [facility], patient decided he needed to toilet; see above for FIMs. Educated patient on walker placement in front of sink to wash hands with improved foot placement and improved posture during handwashing. Patient completed transfer into [facility] with RW with CGA. In gym, patient completed visual perceptual reasoning task using bicolor blocks with and without visual guidelines x 6 patterns with guidelines, 3 patterns without guidelines, working on visual perceptual skills for improved safety and independence with functional transfers. A: Improved positioning and posture standing at sink with rolling walker placed perpendicular to sink. Patient tolerated session well with no complaint of pain. P: Continue OT POC with focus on ADL/IADL skills, functional transfers, functional mobility, coordination, strength, static and dynamic balance, and activity tolerance to maximize safety and independence with ADLs and functional transfers. Patient was left seated up at table for RT. Rafael Lopez Interdisciplinary communication: Collaborated with RT regarding schedule and patient being left up at table.      Eleazar Malik MS, OTR/L  11/14/2017

## 2017-11-14 NOTE — PROGRESS NOTES
11/14/17 1226   Time Spent With Patient   Time In 1117   Time Out 1159   Patient Seen For: AM;Other (see progress notes); ADLs   Grooming   Grooming Assistance  CGA   Comments standing to wash hands at sink    GLENROY Ardon Assistance (FIM Score) S   S: \"I'm doing good, I'm doing good. \"   O: Patient presented seated up at OT table following PT. Agreeable to treatment. Patient completed fine motor coordination task following written instructions using RUE only x 3 lines with good quality, requiring significant amount of extra time to complete. Completed with no errors and minimal assist with one line. Patient required toileting; see above for FIMs. Patient required minimal verbal and tactile cues for foot positioning and posture in standing at sink. A: Progressing well but remains with decreased RUE fine motor coordination. Patient tolerated session well with no complaint of pain. P: Continue OT POC with focus on ADL/IADL skills, functional transfers, functional mobility, coordination, strength, static and dynamic balance, and activity tolerance to maximize safety and independence with ADLs and functional transfers. Patient was left seated up in recliner with call bell/all other needs in reach. Interdisciplinary communication: Collaborated with PT and confirmed that patient is on track to meet goals.      Tyler Castorena, MS, OTR/L  11/14/2017

## 2017-11-15 LAB
GLUCOSE BLD STRIP.AUTO-MCNC: 104 MG/DL (ref 65–100)
GLUCOSE BLD STRIP.AUTO-MCNC: 117 MG/DL (ref 65–100)
GLUCOSE BLD STRIP.AUTO-MCNC: 171 MG/DL (ref 65–100)
GLUCOSE BLD STRIP.AUTO-MCNC: 77 MG/DL (ref 65–100)

## 2017-11-15 PROCEDURE — 65310000000 HC RM PRIVATE REHAB

## 2017-11-15 PROCEDURE — 97535 SELF CARE MNGMENT TRAINING: CPT

## 2017-11-15 PROCEDURE — 99232 SBSQ HOSP IP/OBS MODERATE 35: CPT | Performed by: PHYSICAL MEDICINE & REHABILITATION

## 2017-11-15 PROCEDURE — 82962 GLUCOSE BLOOD TEST: CPT

## 2017-11-15 PROCEDURE — 92507 TX SP LANG VOICE COMM INDIV: CPT

## 2017-11-15 PROCEDURE — 97530 THERAPEUTIC ACTIVITIES: CPT

## 2017-11-15 PROCEDURE — 74011250637 HC RX REV CODE- 250/637: Performed by: PHYSICAL MEDICINE & REHABILITATION

## 2017-11-15 PROCEDURE — 97116 GAIT TRAINING THERAPY: CPT

## 2017-11-15 PROCEDURE — 97110 THERAPEUTIC EXERCISES: CPT

## 2017-11-15 PROCEDURE — 74011636637 HC RX REV CODE- 636/637: Performed by: PHYSICAL MEDICINE & REHABILITATION

## 2017-11-15 RX ADMIN — AMOXICILLIN 1000 MG: 500 CAPSULE ORAL at 08:26

## 2017-11-15 RX ADMIN — METFORMIN HYDROCHLORIDE 500 MG: 500 TABLET, FILM COATED ORAL at 12:26

## 2017-11-15 RX ADMIN — ASPIRIN 81 MG CHEWABLE TABLET 81 MG: 81 TABLET CHEWABLE at 08:27

## 2017-11-15 RX ADMIN — LORATADINE 10 MG: 10 TABLET ORAL at 17:32

## 2017-11-15 RX ADMIN — PANTOPRAZOLE SODIUM 40 MG: 40 TABLET, DELAYED RELEASE ORAL at 16:59

## 2017-11-15 RX ADMIN — AMOXICILLIN 1000 MG: 500 CAPSULE ORAL at 21:31

## 2017-11-15 RX ADMIN — TAMSULOSIN HYDROCHLORIDE 0.4 MG: 0.4 CAPSULE ORAL at 08:25

## 2017-11-15 RX ADMIN — ATORVASTATIN CALCIUM 80 MG: 40 TABLET, FILM COATED ORAL at 08:27

## 2017-11-15 RX ADMIN — GLIPIZIDE 20 MG: 5 TABLET ORAL at 07:42

## 2017-11-15 RX ADMIN — METFORMIN HYDROCHLORIDE 500 MG: 500 TABLET, FILM COATED ORAL at 16:59

## 2017-11-15 RX ADMIN — TEMAZEPAM 30 MG: 15 CAPSULE ORAL at 21:31

## 2017-11-15 RX ADMIN — DOCUSATE SODIUM 100 MG: 100 CAPSULE, LIQUID FILLED ORAL at 08:26

## 2017-11-15 RX ADMIN — SUCRALFATE 1 G: 1 TABLET ORAL at 12:26

## 2017-11-15 RX ADMIN — PANTOPRAZOLE SODIUM 40 MG: 40 TABLET, DELAYED RELEASE ORAL at 07:47

## 2017-11-15 RX ADMIN — FINASTERIDE 5 MG: 5 TABLET, FILM COATED ORAL at 08:27

## 2017-11-15 RX ADMIN — FLUTICASONE PROPIONATE 2 SPRAY: 50 SPRAY, METERED NASAL at 08:28

## 2017-11-15 RX ADMIN — HYDROCHLOROTHIAZIDE: 12.5 CAPSULE ORAL at 08:25

## 2017-11-15 RX ADMIN — SUCRALFATE 1 G: 1 TABLET ORAL at 16:59

## 2017-11-15 RX ADMIN — SUCRALFATE 1 G: 1 TABLET ORAL at 21:31

## 2017-11-15 RX ADMIN — SUCRALFATE 1 G: 1 TABLET ORAL at 06:15

## 2017-11-15 RX ADMIN — METFORMIN HYDROCHLORIDE 500 MG: 500 TABLET, FILM COATED ORAL at 07:43

## 2017-11-15 RX ADMIN — CLARITHROMYCIN 500 MG: 500 TABLET, FILM COATED ORAL at 08:27

## 2017-11-15 RX ADMIN — CLARITHROMYCIN 500 MG: 500 TABLET, FILM COATED ORAL at 17:32

## 2017-11-15 RX ADMIN — AMLODIPINE BESYLATE 5 MG: 5 TABLET ORAL at 08:25

## 2017-11-15 RX ADMIN — INSULIN LISPRO 2 UNITS: 100 INJECTION, SOLUTION INTRAVENOUS; SUBCUTANEOUS at 21:30

## 2017-11-15 NOTE — PROGRESS NOTES
Hourly rounds performed through out shift. All needs met at this time. Report given to oncoming nurse. TOILETING:  Does patient need assist with clothing management and/or pericare? yes     TOILET TRANSFER:  Pt requires moderate assistance. Pt uses wheelchair when he has sleep medication and uses the walker on other occasions.     BLADDER:  Pt does not have a plunkett catheter that staff manages. Pt does take medication. Pt is continent. of bladder and voids in toilet  Pt requires staff to position device Pt has had 0 bladder accidents during this shift requiring moderate assistance to clean up. (An accident is when the episode is not contained in a brief AND/OR the clothing/linen requires changing/cleaning up.)     BOWEL:  Pt does take medication. Pt is continent of bowel and uses toilet. Pt requires staff to position device    Pt has had 0 bowel accidents during this shift requiring moderate assistance from staff to clean up. (An accident is when the episode is not contained in a brief AND/OR the clothing/linen requires changing/cleaning up.)     BED/CHAIR TRANSFER  Pt requires moderate assistance. Patient requires the assistance of one staff member(s).   Pt uses walker and wheelchair

## 2017-11-15 NOTE — PROGRESS NOTES
Socorro Del Castillo MD,   Medical Director  [address] [facility], [address] [facility]  Tel: [phone]       SFD PROGRESS NOTE    Silvia Adair  Admit Date: 11/9/2017  Admit Diagnosis: Left CVA; Stroke (cerebrum) ([address]    Subjective     Slept but afraid he'll get addicted. Options given but elects to stay with benadryl and restoril. Does well during day. RN reports that he sleeps better than he thinks.  Balance worse at noc if he gets up but no confusion    Objective:     Current Facility-Administered Medications   Medication Dose Route Frequency    temazepam (RESTORIL) capsule 30 mg  30 mg Oral QHS    diphenhydrAMINE (BENADRYL) capsule 25 mg  25 mg Oral QHS PRN    amLODIPine (NORVASC) tablet 5 mg  5 mg Oral DAILY    alum-mag hydroxide-simeth (MYLANTA) oral suspension 30 mL  30 mL Oral Q4H PRN    magnesium hydroxide (MILK OF MAGNESIA) 400 mg/5 mL oral suspension 30 mL  30 mL Oral DAILY PRN    acetaminophen (TYLENOL) tablet 650 mg  650 mg Oral Q6H PRN    aspirin chewable tablet 81 mg  81 mg Oral DAILY    hydrALAZINE (APRESOLINE) tablet 25 mg  25 mg Oral QID PRN    docusate sodium (COLACE) capsule 100 mg  100 mg Oral DAILY    bisacodyl (DULCOLAX) suppository 10 mg  10 mg Rectal DAILY PRN    pantoprazole (PROTONIX) tablet 40 mg  40 mg Oral ACB&D    atorvastatin (LIPITOR) tablet 80 mg  80 mg Oral DAILY    amoxicillin (AMOXIL) capsule 1,000 mg  1,000 mg Oral Q12H    clarithromycin (BIAXIN) tablet 500 mg  500 mg Oral BID    finasteride (PROSCAR) tablet 5 mg  5 mg Oral DAILY    glipiZIDE (GLUCOTROL) tablet 20 mg  20 mg Oral ACB    benazepril/hydroCHLOROthiazide (LOTENSIN HCT) 10/12.5 mg   Oral DAILY    fluticasone (FLONASE) 50 mcg/actuation nasal spray 2 Spray  2 Spray Both Nostrils DAILY    loratadine (CLARITIN) tablet 10 mg  10 mg Oral QPM    sucralfate (CARAFATE) tablet 1 g  1 g Oral AC&HS    tamsulosin (FLOMAX) capsule 0.4 mg  0.4 mg Oral DAILY    metFORMIN (GLUCOPHAGE) tablet 500 mg  500 mg Oral TID WITH MEALS    insulin lispro (HUMALOG) injection   SubCUTAneous AC&HS    ondansetron (ZOFRAN ODT) tablet 4 mg  4 mg Oral Q6H PRN     Review of Systems:Denies chest pain, shortness of breath, cough, headache, visual problems, abdominal pain, dysurea, calf pain. Pertinent positives are as noted in the medical records and unremarkable otherwise. Visit Vitals    /75    Pulse 87    Temp 99.5 °F (37.5 °C)    Resp 14    Ht 6' (1.829 m)    Wt 197 lb (89.4 kg)    SpO2 97%    BMI 26.72 kg/m2        Physical Exam:   General: Alert and age appropriately oriented. No acute cardio respiratory distress. HEENT: Normocephalic,no scleral icterus  Oral mucosa moist without cyanosis   Lungs: Clear to auscultation  bilaterally. Respiration even and unlabored   Heart: Regular rate and rhythm, S1, S2   No  murmurs, clicks, rub or gallops   Abdomen: Soft, non-tender, nondistended. Bowel sounds present. No organomegaly. Genitourinary: Benign . Neuromuscular:      Right facial weakness; dysarthria  Rue 4/  PRONATOR DRIFT R.   RLE sens intact; grossly 4+   Skin/extremity: No rashes, no erythema.  No calf tenderness BLE  No edema                                                                            Functional Assessment:          Balance  Sitting - Static: Good (unsupported) (11/11/17 1100)  Sitting - Dynamic: Good (unsupported) (11/11/17 1100)  Standing - Static: Good;Constant support (11/11/17 1100)  Standing - Dynamic : Impaired (11/11/17 1100)           Toileting  Cues:  (SBA for balance and safety) (11/11/17 1231)  Adaptive Equipment: Shantel Miles (11/14/17 2916)         Umair Hirsch Fall Risk Assessment:  Umair Hirsch Fall Risk  Mobility: Ambulates or transfers with assist devices or assistance/unsteady gait (11/14/17 2313)  Mobility Interventions: Patient to call before getting OOB (11/14/17 2313)  Mentation: Alert, oriented x 3 (11/14/17 2313)  Mentation Interventions: Bed/chair exit alarm (11/14/17 2313)  Medication: Patient receiving anticonvulsants, sedatives(tranquilizers), psychotropics or hypnotics, hypoglycemics, narcotics, sleep aids, antihypertensives, laxatives, or diuretics (11/14/17 2313)  Medication Interventions: Assess postural VS orthostatic hypotension (11/14/17 2313)  Elimination: Needs assistance with toileting (11/14/17 2313)  Elimination Interventions: Call light in reach (11/14/17 2313)  Prior Fall History: No (11/14/17 2313)  History of Falls Interventions: Door open when patient unattended (11/14/17 2313)  Total Score: 3 (11/14/17 2313)  Standard Fall Precautions: Yes (11/09/17 1132)  High Fall Risk: Yes (11/14/17 2313)     Speech Assessment:  Aspiration Signs/Symptoms: Delayed cough/throat clear (11/10/17 0910)      Ambulation:  Gait  Distance (ft): 150 Feet (ft) (11/14/17 1607)  Assistive Device: [address], rolling;Gait belt (11/14/17 1607)  Rail Use: Both (11/11/17 1100)     Labs/Studies:  Recent Results (from the past 72 hour(s))   GLUCOSE, POC    Collection Time: 11/12/17 11:22 AM   Result Value Ref Range    Glucose (POC) 223 (H) 65 - 100 mg/dL   GLUCOSE, POC    Collection Time: 11/12/17  4:21 PM   Result Value Ref Range    Glucose (POC) 135 (H) 65 - 100 mg/dL   GLUCOSE, POC    Collection Time: 11/12/17  8:29 PM   Result Value Ref Range    Glucose (POC) 140 (H) 65 - 100 mg/dL   GLUCOSE, POC    Collection Time: 11/13/17  7:36 AM   Result Value Ref Range    Glucose (POC) 124 (H) 65 - 100 mg/dL   GLUCOSE, POC    Collection Time: 11/13/17 11:37 AM   Result Value Ref Range    Glucose (POC) 146 (H) 65 - 100 mg/dL   GLUCOSE, POC    Collection Time: 11/13/17  4:22 PM   Result Value Ref Range    Glucose (POC) 123 (H) 65 - 100 mg/dL   GLUCOSE, POC    Collection Time: 11/13/17  8:27 PM   Result Value Ref Range    Glucose (POC) 107 (H) 65 - 100 mg/dL   GLUCOSE, POC    Collection Time: 11/14/17  7:05 AM   Result Value Ref Range    Glucose (POC) 94 65 - 100 mg/dL   GLUCOSE, POC    Collection Time: 11/14/17 11:20 AM   Result Value Ref Range    Glucose (POC) 79 65 - 100 mg/dL   GLUCOSE, POC    Collection Time: 11/14/17  3:43 PM   Result Value Ref Range    Glucose (POC) 44 (L) 65 - 100 mg/dL   GLUCOSE, POC    Collection Time: 11/14/17  4:26 PM   Result Value Ref Range    Glucose (POC) 79 65 - 100 mg/dL   GLUCOSE, POC    Collection Time: 11/14/17  8:51 PM   Result Value Ref Range    Glucose (POC) 148 (H) 65 - 100 mg/dL   GLUCOSE, POC    Collection Time: 11/15/17  7:12 AM   Result Value Ref Range    Glucose (POC) 117 (H) 65 - 100 mg/dL       Assessment:     Problem List as of 11/15/2017  Date Reviewed: 10/23/2017          Codes Class Noted - Resolved    Stroke (cerebrum) ([facility] ICD-10-CM: I63.9  ICD-9-CM: 434.91  11/9/2017 - Present        Abdominal bloating ICD-10-CM: R14.0  ICD-9-CM: 787.3  10/23/2017 - Present        Nausea ICD-10-CM: R11.0  ICD-9-CM: 787.02  8/15/2017 - Present        Fatty liver ICD-10-CM: K76.0  ICD-9-CM: 571.8  6/28/2017 - Present        Lower abdominal pain ICD-10-CM: R10.30  ICD-9-CM: 789.09  6/19/2017 - Present        Neck pain ICD-10-CM: M54.2  ICD-9-CM: 723.1  12/27/2016 - Present        GERD (gastroesophageal reflux disease) ICD-10-CM: K21.9  ICD-9-CM: 530.81  6/23/2016 - Present        Type 2 diabetes mellitus ([facility] ICD-10-CM: E11.9  ICD-9-CM: 250.00  2/25/2015 - Present        Allergic rhinitis ICD-10-CM: J30.9  ICD-9-CM: 477.9  2/25/2015 - Present        HLD (hyperlipidemia) ICD-10-CM: E78.5  ICD-9-CM: 272.4  2/25/2015 - Present        Enlarged prostate with lower urinary tract symptoms (LUTS) ICD-10-CM: N40.1  ICD-9-CM: 600.01  2/25/2015 - Present        Essential hypertension ICD-10-CM: I10  ICD-9-CM: 401.9  2/25/2015 - Present        Asthma ICD-10-CM: J45.909  ICD-9-CM: 493.90  2/25/2015 - Present        RESOLVED: Delusional disorder ([facility] ICD-10-CM: F22  ICD-9-CM: 297.1  2/25/2015 - 6/28/2017        RESOLVED: Backache, unspecified ICD-10-CM: M54.9  ICD-9-CM: 724.5  2/25/2015 - 10/23/2017        RESOLVED: Urinary tract infection, site not specified ICD-10-CM: N39.0  ICD-9-CM: 599.0  2/25/2015 - 10/23/2017               Pontine Infarct, ischemic          Continue daily physician medical management:  Pneumonia prophylaxis- Incentive spirometer every hour while awake      Ischemic CVA; cont asa and lipitor; permissive htn      DVT risk / DVT Prophylaxis- Will require daily physician exam to assess for signs and symptoms as patient is at increased risk for of thromboembolism. Mobilization as tolerated. Intermittent pneumatic compression devices when in bed Thigh-high or knee-high thromboembolic deterrent hose when out of bed. Add subcut heparin       Pain Control: no current joint or muscle symptoms, essentially pain-free. Will require regular pain assessment and comprenhensive pain management,       Hypertension - BP uncontrolled, fluctuating, managed medically. Continue Lotensin and add prn hydralazine. His SBP upon arrival was >220 and DBP >110   monitor closely, consider adding metoprolol; want permissive htn for cerebral perfusion but goal 120-140s  -11/10 174/86 add norvasc  - 11/11 SBP- 143 this am, continues to improve, on norvasc and lotensin      Diabetes mellitus - Uncontrolled. poor glycemic control. Will require daily, close FSG monitoring and medication adjustment to optimize glycemic control in setting of acute illness and hospitalization. Cont metformin and glucotrol 20mg. Add SSI. Cont carb restricted diet, accuchecks qac and qhs  -11/10 BS fine  - BG - 121 this am, yesterday 121- 165 range  -11/14 consider changing to bid bs  -11/15 bs of 44 yesterday; 117 this am.; consider dropping glucotrol dose      Urinary retention/ neurogenic bladder - schedule voids q6-8 hrs. Check post-void residual as needed; In and Out catheterize if post-void residual is more than 400 cc. Pt with hx of prostate issues .  Cont flomax and proscar      bowel program - add colace and prn bowel program      GERD - resume PPI. At times may need additional antacids, Maalox prn. On omeprazole which is nonformulary, thus change to protonix 40mg bid.  -H pylori; cont Biaxin and amoxil and carafate. Will treat with dual abx for 2 weeks. Will f/u with GI post dc  - no gi complaints     Asthma/allergic rhinitis; cont flonase and zyrtec.       Sleep mgmt-  will continue to increase prn trazodone to 100mg  -11/13 had difficult noc with restlessness, confusion; dc trazadone and try benadryl and restoril; monitor for confusion; could consider remeron  -11/14 a bit better noc; 11/15 cont current plan              Time spent was 25 minutes with over 1/2 in direct patient care/examination, consultation and coordination of care.      Signed By: Dave Valladares MD     November 15, 2017

## 2017-11-15 NOTE — PROGRESS NOTES
PHYSICAL THERAPY DAILY NOTE  Time In: 1300  Time Out: 3111  Patient Seen For: PM;Other (see progress notes);Gait training; Therapeutic exercise;Transfer training    Subjective: Patient had no complaints. Objective: No pain noted. Other (comment) (Fall risk)  GROSS ASSESSMENT Daily Assessment            BED/MAT MOBILITY Daily Assessment    Supine to Sit : 0 (Not tested)       TRANSFERS Daily Assessment    Transfer Type: SPT with walker  Transfer Assistance : 4 (Contact guard assistance)  Sit to Stand Assistance: Contact guard assistance       GAIT Daily Assessment    Amount of Assistance: 4 (Contact guard assistance)  Distance (ft): 150 Feet (ft)  Assistive Device: Walker, rolling       STEPS or STAIRS Daily Assessment    Level of Assist : 0 (Not tested)       BALANCE Daily Assessment            WHEELCHAIR MOBILITY Daily Assessment            LOWER EXTREMITY EXERCISES Daily Assessment    Extremity: Both  Exercise Type #1: Seated lower extremity strengthening  Sets Performed: 1  Reps Performed: 20  Level of Assist: Supervision          Assessment: Patient making good progress. Plan of Care: Continue with plan of care to reach PT goals. Returned to room with call colin at reach.     Lilibeth Marie, PTA  11/15/2017

## 2017-11-15 NOTE — PROGRESS NOTES
11/15/17 1008   Time Spent With Patient   Time In 0745   Time Out 0831   Patient Seen For: AM;ADLs   Grooming   Grooming Assistance  CGA   Comments CGA when standing at sink; multiple cues to stand upright    Upper Body Bathing   Bathing Assistance, Upper S   Lower Body Bathing   Bathing Assistance, Lower  SBA   Adaptive Equipment Grab bar   Position Performed Standing; Other (comment)  (seated on tub transfer bench )   Adaptive Equipment Tub bench   Comments verbal cue to use grab bar in standing and for upright posture    Toileting   Toileting Assistance (FIM Score) S   Adaptive Equipment Elevated seat;Grab bars   Upper Body Dressing    Dressing Assistance  S   Comments setup assist    Lower Body Dressing    Dressing Assistance  SBA   Leg Crossed Method Used No   Position Performed Seated in chair;Standing;Bending forward method   Functional Transfers   Toilet Transfer  Elevated seat;Grab bars;Stand pivot transfer with walker   Amount of Assistance Required CGA   Tub or Shower Type Shower   Amount of Assistance Required CGA   Adaptive Equipment Tub transfer bench;Walker (comment);Grab bars   S: \"I just can't sleep at night. I don't know why. It's just me. \"   O: Patient presented seated up in recliner. Agreeable to treatment. Patient completed ADL; see above for FIMs. Continues to require moderate cues for upright posture during ambulation and standing tasks. A: Remains limited by decreased upright posture. Patient tolerated session well with no complaint of pain. P: Continue OT POC with focus on ADL/IADL skills, functional transfers, functional mobility, coordination, strength, static and dynamic balance, and activity tolerance to maximize safety and independence with ADLs and functional transfers. Patient was left seated up in [facility] in room with call bell/all other needs in reach. Nurse and PTA present. Interdisciplinary communication: Collaborated with PTA regarding cues needed for upright posture. Dusty Delarosa MS, OTR/L  11/15/2017

## 2017-11-15 NOTE — PROGRESS NOTES
11/15/17 1046   Time Spent With Patient   Time In 0923   Time Out 1000   Patient Seen For: AM;Neuro-linguistics   Mental Status   Neurologic State Alert   Orientation Level Oriented X4   Cognition Appropriate decision making   Perception Appears intact   Perseveration No perseveration noted   Safety/Judgement Fall prevention   Pt completes subtest 6-10 of the PADMINI with increased time and min cues with 90% accuracy.     Davida Byrd MA/JAJA/SLP

## 2017-11-15 NOTE — PROGRESS NOTES
End Of Shift Functional Summary, Nursing      TOILETING:  Does patient need assist with clothing management and/or pericare? Yes: Comment: SBA, steadying if needed. TOILET TRANSFER:  Pt requires standby assistance/setup. Pt uses walker. BLADDER:  Pt does not have a plunkett catheter that staff manages. Pt does take medication. Pt is continent. of bladder and voids in toilet   Pt has had 0 bladder accidents d. (An accident is when the episode is not contained in a brief AND/OR the clothing/linen requires changing/cleaning up.)    BOWEL:  Pt does take medication. Pt is continent of bowel and uses toilet. }    Pt has had 0 bowel accidents during this shift  . (An accident is when the episode is not contained in a brief AND/OR the clothing/linen requires changing/cleaning up.)    BED/CHAIR TRANSFER  Pt requires minimal assistance. Patient requires the assistance of 1 staff member(s). Pt uses walker      EATING  Pt requires setup. Pt wears dentures. TUBE FEEDINGS:  Pt does not  receive nutrition through tube feedings. Patient requires assistance with opening bottle cap and opening juices. Documentation reviewed and plan of care discussed/reviewed with   patient during the shift.

## 2017-11-15 NOTE — PROGRESS NOTES
PHYSICAL THERAPY DAILY NOTE  Time In: 3904  Time Out: 6799  Patient Seen For: AM;Other (see progress notes);Gait training; Therapeutic exercise;Transfer training    Subjective: \" I didn't sleep again last night. \" \" I feel awful. \"         Objective: No pain noted. He did seem fatigued and sleepy. Other (comment) (Fall risk)  GROSS ASSESSMENT Daily Assessment            BED/MAT MOBILITY Daily Assessment    Supine to Sit : 0 (Not tested)       TRANSFERS Daily Assessment    Transfer Type: SPT with walker  Transfer Assistance : 4 (Contact guard assistance)  Sit to Stand Assistance: Contact guard assistance       GAIT Daily Assessment    Amount of Assistance: 4 (Contact guard assistance)  Distance (ft): 150 Feet (ft)  Assistive Device: Walker, rolling;Gait belt       STEPS or STAIRS Daily Assessment    Level of Assist : 0 (Not tested)       BALANCE Daily Assessment            WHEELCHAIR MOBILITY Daily Assessment            LOWER EXTREMITY EXERCISES Daily Assessment    Extremity: Both  Exercise Type #1: Supine lower extremity strengthening  Sets Performed: 2  Reps Performed: 15  Level of Assist: Supervision          Assessment: Patient making progress . He is improving with balance with gait and increasing endurance. Plan of Care: Continue with plan of care to reach PT goals. Returned to room with call bell at [facility].     Radha Gastelum, PTA  11/15/2017

## 2017-11-15 NOTE — PROGRESS NOTES
OT Daily Note  Time In 1030   Time Out 1114     Subjective: \"Can I go to the bathroom first?\"   Pain: none reported  Education: positioning, posture in standing   Interdisciplinary Communication: handoff to PT   Precautions: Other (comment) (fall risk)  Location on arrival: up in recliner    Self-Care Daily Assessment   Patient required toileting; see below:   Self Care Task: Toileting  Level of Assist: 5 (Supervision)  Adaptive Equipment: Elevated seat, Grab bars  Facilitated weightshift to R and proper positioning of rolling walker at sink for improved positioning in static standing. Continues to require cues for upright posture and walker placement during handwashing. Coordination Daily Assessment   Patient completed RUE fine motor coordination and visual perceptual/saccades task replicating two moderately complex patterns on rubberband board with RUE only with good quality and extra time. Completed with no errors and no assist.  Making steady gains with RUE coordination. Patient was left seated up in gym for PT. Assessment: See above. Always very pleasant and cooperative; hard working and motivated. Plan: Continue OT POC with focus on ADL/IADL skills, functional transfers, functional mobility, coordination, strength, static and dynamic balance, and activity tolerance to maximize safety and independence with ADLs and functional transfers.      Suma Valderrama, SAMM DONNELLY/JENA  11/15/2017

## 2017-11-15 NOTE — PROGRESS NOTES
Subjective \"I can tell that these exercises are working my arms. \"   Activity Tennis/balloon hit & bean bag toss with his RUE   Strength/Endurance Patient tolerated the session with rest breaks needed throughout the session. Balance NT   Social Interaction Patient was friendly and conversational during the session. He laughed and joked approprietly with this therapist.    Cognitive A&O X4   Comments Patient was assisted to his room and assisted to his recliner chair. He was left with all needs within reach.       Escobar Saenz, SHANAES

## 2017-11-16 LAB
GLUCOSE BLD STRIP.AUTO-MCNC: 110 MG/DL (ref 65–100)
GLUCOSE BLD STRIP.AUTO-MCNC: 116 MG/DL (ref 65–100)
GLUCOSE BLD STRIP.AUTO-MCNC: 124 MG/DL (ref 65–100)
GLUCOSE BLD STRIP.AUTO-MCNC: 95 MG/DL (ref 65–100)

## 2017-11-16 PROCEDURE — 97116 GAIT TRAINING THERAPY: CPT

## 2017-11-16 PROCEDURE — 97110 THERAPEUTIC EXERCISES: CPT

## 2017-11-16 PROCEDURE — 97530 THERAPEUTIC ACTIVITIES: CPT

## 2017-11-16 PROCEDURE — 97535 SELF CARE MNGMENT TRAINING: CPT

## 2017-11-16 PROCEDURE — 65310000000 HC RM PRIVATE REHAB

## 2017-11-16 PROCEDURE — 92507 TX SP LANG VOICE COMM INDIV: CPT

## 2017-11-16 PROCEDURE — 82962 GLUCOSE BLOOD TEST: CPT

## 2017-11-16 PROCEDURE — 74011250637 HC RX REV CODE- 250/637: Performed by: PHYSICAL MEDICINE & REHABILITATION

## 2017-11-16 PROCEDURE — 99232 SBSQ HOSP IP/OBS MODERATE 35: CPT | Performed by: PHYSICAL MEDICINE & REHABILITATION

## 2017-11-16 RX ORDER — ESCITALOPRAM OXALATE 10 MG/1
10 TABLET ORAL DAILY
Status: DISCONTINUED | OUTPATIENT
Start: 2017-11-16 | End: 2017-11-22 | Stop reason: HOSPADM

## 2017-11-16 RX ADMIN — SUCRALFATE 1 G: 1 TABLET ORAL at 17:31

## 2017-11-16 RX ADMIN — DIPHENHYDRAMINE HYDROCHLORIDE 25 MG: 25 CAPSULE ORAL at 01:12

## 2017-11-16 RX ADMIN — SUCRALFATE 1 G: 1 TABLET ORAL at 21:51

## 2017-11-16 RX ADMIN — ESCITALOPRAM OXALATE 10 MG: 10 TABLET ORAL at 12:11

## 2017-11-16 RX ADMIN — SUCRALFATE 1 G: 1 TABLET ORAL at 05:31

## 2017-11-16 RX ADMIN — CLARITHROMYCIN 500 MG: 500 TABLET, FILM COATED ORAL at 08:12

## 2017-11-16 RX ADMIN — TAMSULOSIN HYDROCHLORIDE 0.4 MG: 0.4 CAPSULE ORAL at 08:11

## 2017-11-16 RX ADMIN — ASPIRIN 81 MG CHEWABLE TABLET 81 MG: 81 TABLET CHEWABLE at 08:12

## 2017-11-16 RX ADMIN — HYDROCHLOROTHIAZIDE: 12.5 CAPSULE ORAL at 08:12

## 2017-11-16 RX ADMIN — FLUTICASONE PROPIONATE 2 SPRAY: 50 SPRAY, METERED NASAL at 08:15

## 2017-11-16 RX ADMIN — METFORMIN HYDROCHLORIDE 500 MG: 500 TABLET, FILM COATED ORAL at 08:12

## 2017-11-16 RX ADMIN — TEMAZEPAM 30 MG: 15 CAPSULE ORAL at 21:57

## 2017-11-16 RX ADMIN — PANTOPRAZOLE SODIUM 40 MG: 40 TABLET, DELAYED RELEASE ORAL at 17:31

## 2017-11-16 RX ADMIN — METFORMIN HYDROCHLORIDE 500 MG: 500 TABLET, FILM COATED ORAL at 12:11

## 2017-11-16 RX ADMIN — GLIPIZIDE 20 MG: 5 TABLET ORAL at 08:11

## 2017-11-16 RX ADMIN — METFORMIN HYDROCHLORIDE 500 MG: 500 TABLET, FILM COATED ORAL at 17:31

## 2017-11-16 RX ADMIN — AMLODIPINE BESYLATE 5 MG: 5 TABLET ORAL at 08:11

## 2017-11-16 RX ADMIN — PANTOPRAZOLE SODIUM 40 MG: 40 TABLET, DELAYED RELEASE ORAL at 05:31

## 2017-11-16 RX ADMIN — AMOXICILLIN 1000 MG: 500 CAPSULE ORAL at 08:12

## 2017-11-16 RX ADMIN — LORATADINE 10 MG: 10 TABLET ORAL at 17:31

## 2017-11-16 RX ADMIN — DOCUSATE SODIUM 100 MG: 100 CAPSULE, LIQUID FILLED ORAL at 08:12

## 2017-11-16 RX ADMIN — ATORVASTATIN CALCIUM 80 MG: 40 TABLET, FILM COATED ORAL at 08:11

## 2017-11-16 RX ADMIN — SUCRALFATE 1 G: 1 TABLET ORAL at 12:11

## 2017-11-16 RX ADMIN — FINASTERIDE 5 MG: 5 TABLET, FILM COATED ORAL at 08:12

## 2017-11-16 NOTE — PROGRESS NOTES
Pt sitting in recliner watching tv. Denies needs or concerns at this time. Alert and oriented x4. Lungs sounds clear bilaterally with respirations even and unlabored. Right facial droop. Bowel sounds active in all quadrants. +2 pulses bilaterally in upper and lower extremities. Instructed to call for assistance. Call light in reach. Voiced understanding and identified call light.

## 2017-11-16 NOTE — PROGRESS NOTES
Alley Murrieta MD,   Medical Director  [address] [facility], [address] [facility]  Tel: [phone]       SFD PROGRESS NOTE    Silvia Adair  Admit Date: 11/9/2017  Admit Diagnosis: Left CVA; Stroke (cerebrum) ([address]    Subjective     Slept a bit better but didn't get Benadryl until 1a.m  Depressed, frustrated, thinks he is not progressing. \" they want me to use my right arm and I dont want to. \"     Objective:     Current Facility-Administered Medications   Medication Dose Route Frequency    temazepam (RESTORIL) capsule 30 mg  30 mg Oral QHS    diphenhydrAMINE (BENADRYL) capsule 25 mg  25 mg Oral QHS PRN    amLODIPine (NORVASC) tablet 5 mg  5 mg Oral DAILY    alum-mag hydroxide-simeth (MYLANTA) oral suspension 30 mL  30 mL Oral Q4H PRN    magnesium hydroxide (MILK OF MAGNESIA) 400 mg/5 mL oral suspension 30 mL  30 mL Oral DAILY PRN    acetaminophen (TYLENOL) tablet 650 mg  650 mg Oral Q6H PRN    aspirin chewable tablet 81 mg  81 mg Oral DAILY    hydrALAZINE (APRESOLINE) tablet 25 mg  25 mg Oral QID PRN    docusate sodium (COLACE) capsule 100 mg  100 mg Oral DAILY    bisacodyl (DULCOLAX) suppository 10 mg  10 mg Rectal DAILY PRN    pantoprazole (PROTONIX) tablet 40 mg  40 mg Oral ACB&D    atorvastatin (LIPITOR) tablet 80 mg  80 mg Oral DAILY    amoxicillin (AMOXIL) capsule 1,000 mg  1,000 mg Oral Q12H    clarithromycin (BIAXIN) tablet 500 mg  500 mg Oral BID    finasteride (PROSCAR) tablet 5 mg  5 mg Oral DAILY    glipiZIDE (GLUCOTROL) tablet 20 mg  20 mg Oral ACB    benazepril/hydroCHLOROthiazide (LOTENSIN HCT) 10/12.5 mg   Oral DAILY    fluticasone (FLONASE) 50 mcg/actuation nasal spray 2 Spray  2 Spray Both Nostrils DAILY    loratadine (CLARITIN) tablet 10 mg  10 mg Oral QPM    sucralfate (CARAFATE) tablet 1 g  1 g Oral AC&HS    tamsulosin (FLOMAX) capsule 0.4 mg  0.4 mg Oral DAILY    metFORMIN (GLUCOPHAGE) tablet 500 mg  500 mg Oral TID WITH MEALS    insulin lispro (HUMALOG) injection   SubCUTAneous AC&HS    ondansetron (ZOFRAN ODT) tablet 4 mg  4 mg Oral Q6H PRN     Review of Systems:Denies chest pain, shortness of breath, cough, headache, visual problems, abdominal pain, dysurea, calf pain. Pertinent positives are as noted in the medical records and unremarkable otherwise. Visit Vitals    /75    Pulse 85    Temp 97.3 °F (36.3 °C)    Resp 18    Ht 6' (1.829 m)    Wt 197 lb (89.4 kg)    SpO2 97%    BMI 26.72 kg/m2        Physical Exam:   General: Alert and age appropriately oriented. No acute cardio respiratory distress. HEENT: Normocephalic,no scleral icterus; right lower facial weakness  Oral mucosa moist without cyanosis   Lungs: Clear to auscultation  bilaterally. Respiration even and unlabored   Heart: Regular rate and rhythm, S1, S2   No  murmurs, clicks, rub or gallops   Abdomen: Soft, non-tender, nondistended. Bowel sounds present. No organomegaly. Genitourinary: Benign . Neuromuscular:      dysarthria  Rue 4/ 5 ; right pronator drift  RLE sens intact; grossly 4+   Skin/extremity: No rashes, no erythema.  No calf tenderness BLE  No edema                                                                            Functional Assessment:          Balance  Sitting - Static: Good (unsupported) (11/11/17 1100)  Sitting - Dynamic: Good (unsupported) (11/11/17 1100)  Standing - Static: Good;Constant support (11/11/17 1100)  Standing - Dynamic : Impaired (11/11/17 1100)           Toileting  Cues:  (SBA for balance and safety) (11/11/17 1231)  Adaptive Equipment: Elevated seat;Grab bars (11/15/17 1008)         Jaiden Fall Risk Assessment:  Steve Avalos Fall Risk  Mobility: Ambulates or transfers with assist devices or assistance/unsteady gait (11/15/17 2301)  Mobility Interventions: Patient to call before getting OOB (11/15/17 2301)  Mentation: Alert, oriented x 3 (11/15/17 2301)  Mentation Interventions: Door open when patient unattended (11/15/17 2301)  Medication: Patient receiving anticonvulsants, sedatives(tranquilizers), psychotropics or hypnotics, hypoglycemics, narcotics, sleep aids, antihypertensives, laxatives, or diuretics (11/15/17 2301)  Medication Interventions: Bed/chair exit alarm (11/15/17 2301)  Elimination: Needs assistance with toileting (11/15/17 2301)  Elimination Interventions: Bed/chair exit alarm (11/15/17 2301)  Prior Fall History: No (11/15/17 2301)  History of Falls Interventions: Bed/chair exit alarm (11/15/17 2301)  Total Score: 3 (11/15/17 2301)  Standard Fall Precautions: Yes (11/09/17 1132)  High Fall Risk: Yes (11/15/17 2301)     Speech Assessment:  Aspiration Signs/Symptoms: Delayed cough/throat clear (11/10/17 0910)      Ambulation:  Gait  Distance (ft): 150 Feet (ft) (11/15/17 1533)  Assistive Device: Alveta Gallery, rolling (11/15/17 1533)  Rail Use: Both (11/11/17 1100)     Labs/Studies:  Recent Results (from the past 72 hour(s))   GLUCOSE, POC    Collection Time: 11/13/17 11:37 AM   Result Value Ref Range    Glucose (POC) 146 (H) 65 - 100 mg/dL   GLUCOSE, POC    Collection Time: 11/13/17  4:22 PM   Result Value Ref Range    Glucose (POC) 123 (H) 65 - 100 mg/dL   GLUCOSE, POC    Collection Time: 11/13/17  8:27 PM   Result Value Ref Range    Glucose (POC) 107 (H) 65 - 100 mg/dL   GLUCOSE, POC    Collection Time: 11/14/17  7:05 AM   Result Value Ref Range    Glucose (POC) 94 65 - 100 mg/dL   GLUCOSE, POC    Collection Time: 11/14/17 11:20 AM   Result Value Ref Range    Glucose (POC) 79 65 - 100 mg/dL   GLUCOSE, POC    Collection Time: 11/14/17  3:43 PM   Result Value Ref Range    Glucose (POC) 44 (L) 65 - 100 mg/dL   GLUCOSE, POC    Collection Time: 11/14/17  4:26 PM   Result Value Ref Range    Glucose (POC) 79 65 - 100 mg/dL   GLUCOSE, POC    Collection Time: 11/14/17  8:51 PM   Result Value Ref Range    Glucose (POC) 148 (H) 65 - 100 mg/dL   GLUCOSE, POC    Collection Time: 11/15/17  7:12 AM   Result Value Ref Range    Glucose (POC) 117 (H) 65 - 100 mg/dL   GLUCOSE, POC    Collection Time: 11/15/17 10:52 AM   Result Value Ref Range    Glucose (POC) 104 (H) 65 - 100 mg/dL   GLUCOSE, POC    Collection Time: 11/15/17  4:08 PM   Result Value Ref Range    Glucose (POC) 77 65 - 100 mg/dL   GLUCOSE, POC    Collection Time: 11/15/17  9:10 PM   Result Value Ref Range    Glucose (POC) 171 (H) 65 - 100 mg/dL   GLUCOSE, POC    Collection Time: 11/16/17  7:07 AM   Result Value Ref Range    Glucose (POC) 110 (H) 65 - 100 mg/dL       Assessment:     Problem List as of 11/16/2017  Date Reviewed: 10/23/2017          Codes Class Noted - Resolved    Stroke (cerebrum) ([facility] ICD-10-CM: I63.9  ICD-9-CM: 434.91  11/9/2017 - Present        Abdominal bloating ICD-10-CM: R14.0  ICD-9-CM: 787.3  10/23/2017 - Present        Nausea ICD-10-CM: R11.0  ICD-9-CM: 787.02  8/15/2017 - Present        Fatty liver ICD-10-CM: K76.0  ICD-9-CM: 571.8  6/28/2017 - Present        Lower abdominal pain ICD-10-CM: R10.30  ICD-9-CM: 789.09  6/19/2017 - Present        Neck pain ICD-10-CM: M54.2  ICD-9-CM: 723.1  12/27/2016 - Present        GERD (gastroesophageal reflux disease) ICD-10-CM: K21.9  ICD-9-CM: 530.81  6/23/2016 - Present        Type 2 diabetes mellitus ([facility] ICD-10-CM: E11.9  ICD-9-CM: 250.00  2/25/2015 - Present        Allergic rhinitis ICD-10-CM: J30.9  ICD-9-CM: 477.9  2/25/2015 - Present        HLD (hyperlipidemia) ICD-10-CM: E78.5  ICD-9-CM: 272.4  2/25/2015 - Present        Enlarged prostate with lower urinary tract symptoms (LUTS) ICD-10-CM: N40.1  ICD-9-CM: 600.01  2/25/2015 - Present        Essential hypertension ICD-10-CM: I10  ICD-9-CM: 401.9  2/25/2015 - Present        Asthma ICD-10-CM: J45.909  ICD-9-CM: 493.90  2/25/2015 - Present        RESOLVED: Delusional disorder ([facility] ICD-10-CM: F22  ICD-9-CM: 297.1  2/25/2015 - 6/28/2017        RESOLVED: Backache, unspecified ICD-10-CM: M54.9  ICD-9-CM: 724.5  2/25/2015 - 10/23/2017        RESOLVED: Urinary tract infection, site not specified ICD-10-CM: N39.0  ICD-9-CM: 599.0  2/25/2015 - 10/23/2017            Pontine Infarct, ischemic          Continue daily physician medical management:  Pneumonia prophylaxis- Incentive spirometer every hour while awake      Ischemic CVA; cont asa and lipitor; permissive htn      DVT risk / DVT Prophylaxis- Will require daily physician exam to assess for signs and symptoms as patient is at increased risk for of thromboembolism. Mobilization as tolerated. Intermittent pneumatic compression devices when in bed Thigh-high or knee-high thromboembolic deterrent hose when out of bed. Add subcut heparin       Pain Control: no current joint or muscle symptoms, essentially pain-free. Will require regular pain assessment and comprenhensive pain management,       Hypertension - BP uncontrolled, fluctuating, managed medically. Continue Lotensin and add prn hydralazine. His SBP upon arrival was >220 and DBP >110   monitor closely, consider adding metoprolol; want permissive htn for cerebral perfusion but goal 120-140s  -11/10 174/86 add norvasc  - 11/11 SBP- 143 this am, continues to improve, on norvasc and lotensin  -11/16 146/74; permissive htn ok      Diabetes mellitus - Uncontrolled. poor glycemic control. Will require daily, close FSG monitoring and medication adjustment to optimize glycemic control in setting of acute illness and hospitalization. Cont metformin and glucotrol 20mg. Add SSI. Cont carb restricted diet, accuchecks qac and qhs  -11/10 BS fine  - BG - 121 this am, yesterday 121- 165 range  -11/14 consider changing to bid bs  -11/15 bs of 44 yesterday; 117 this am.; consider dropping glucotrol dose  -11/16 bs 110 this a.m ; no hypoglycemia past 24 hrs      Urinary retention/ neurogenic bladder - schedule voids q6-8 hrs. Check post-void residual as needed; In and Out catheterize if post-void residual is more than 400 cc. Pt with hx of prostate issues .  Cont flomax and proscar      bowel program - add colace and prn bowel program      GERD - resume PPI. At times may need additional antacids, Maalox prn. On omeprazole which is nonformulary, thus change to protonix 40mg bid.  -H pylori; cont Biaxin and amoxil and carafate. Will treat with dual abx for 2 weeks. Will f/u with GI post dc  - no gi complaints      Asthma/allergic rhinitis; cont flonase and zyrtec.       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      Signed By: Elder Hassan MD     November 16, 2017

## 2017-11-16 NOTE — PROGRESS NOTES
End Of Shift Functional Summary, Nursing      TOILETING:  Does patient need assist with clothing management and/or pericare? Yes    TOILET TRANSFER:  Pt requires standby assistance/setup. Pt uses walker. BLADDER:  Pt does not have a plunkett catheter that staff manages. Pt is continent. of bladder and voids in toilet and urinal.  Pt requires staff to empty device Pt has had 0 bladder accidents during this shift a brief AND/OR the clothing/linen requires changing/cleaning up.)    BOWEL:    Pt is continent of bowel and uses toilet. Pt has had 0 bowel accidents during this shift . (An accident is when the episode is not contained in a brief AND/OR the clothing/linen requires changing/cleaning up.)    Documentation reviewed and plan of care discussed/reviewed with   patient assistant during the shift.

## 2017-11-16 NOTE — PROGRESS NOTES
OT Daily Note  Time In 1030   Time Out 1115     Subjective: \"I just went. \" [to the bathroom]   Pain: none reported  Education: walker management, posture, technique for palm to tip translation   Interdisciplinary Communication: nurse aware that patient out to therapy   Precautions: Other (comment) (fall risk)  Location on arrival: up in recliner    Transfers Daily Assessment   Patient transferred recliner to [facility] using SPT with RW with SBA. Educated patient on stepping back to [facility] and on reaching back with good carryover. Continues to progress. Coordination Daily Assessment   Patient completed 9-Hole Peg test, RUE only, completing in 2:01 (was 4:49 on evaluation). Patient completed fine motor coordination task using RUE only to engage in game [employer], with focus on palm to tip translation to manage small/medium sized beads and on depositing beads using thumb and forefinger. Required entire session to complete. Remarkable improvement in RUE coordination as evidenced by improvement of 3:49 minutes. Patient was left seated up in gym for PT. Assessment: See above. Making great progress with RUE coordination. Plan: Continue OT POC with focus on ADL/IADL skills, functional transfers, functional mobility, coordination, strength, static and dynamic balance, and activity tolerance to maximize safety and independence with ADLs and functional transfers.      Tyler Castorena, MS, OTR/L  11/16/2017

## 2017-11-16 NOTE — PROGRESS NOTES
Team conference; discharge scheduled for 11/22 with outpatient PT, OT and ST. Recommend family training. Discussed with pt; would like therapy in [facility]. SW to discuss with spouse and schedule family training. Continue to follow.

## 2017-11-16 NOTE — PROGRESS NOTES
Subjective \"Hey! Ready to work? \"   Activity Connect 4 with the use of his RUE   Strength/Endurance Patient was able to place the checkers in the game piece without difficulty. He commented about his tired his hand would get over repetitive movements and need rest breaks. Balance Sit<->stand:  CGA with RW   Social Interaction Patient was friendly and in better spirits than he apparently was in the am.  He smiled and joked with this therapist.  It was apparent that he did still have something on his mind that he was focused. Cognitive A&O X4   Comments Patient was assisted to his room and into the recliner. He was left with all needs within reach awaiting SHANAE GuerraS

## 2017-11-16 NOTE — PROGRESS NOTES
End Of Shift Functional Summary, Nursing      TOILETING:  Does patient need assist with clothing management and/or pericare? Yes  TOILET TRANSFER:  Pt requires standby assistance/setup. Pt uses walker. BLADDER:  Pt does not have a plunkett catheter that staff manages. Pt does take medication. Pt is continent. of bladder and voids in toilet  Pt requires staff to stand by assist to BR. Pt has had 0 bladder accidents during this shift requiring standby assistance/setup to clean up. (An accident is when the episode is not contained in a brief AND/OR the clothing/linen requires changing/cleaning up.)    BOWEL:  Pt does take medication. Pt is continent of bowel and uses toilet. Pt requires staff to assist him to the bathroom. Pt has had 0 bowel accidents during this shift requiring standby assistance/setup from staff to clean up. (An accident is when the episode is not contained in a brief AND/OR the clothing/linen requires changing/cleaning up.)    BED/CHAIR TRANSFER  Pt requires standby assistance/setup. Patient requires the assistance of one  staff member(s).   Pt uses walker

## 2017-11-16 NOTE — PROGRESS NOTES
Problem: Falls - Risk of  Goal: *Absence of Falls  Document Jaiden Fall Risk and appropriate interventions in the flowsheet.    Outcome: Progressing Towards Goal  Fall Risk Interventions:  Mobility Interventions: Patient to call before getting OOB, Strengthening exercises (ROM-active/passive)    Mentation Interventions: Evaluate medications/consider consulting pharmacy, Increase mobility    Medication Interventions: Evaluate medications/consider consulting pharmacy, Patient to call before getting OOB    Elimination Interventions: Call light in reach, Patient to call for help with toileting needs    History of Falls Interventions: Bed/chair exit alarm, Evaluate medications/consider consulting pharmacy

## 2017-11-16 NOTE — PROGRESS NOTES
Restoril 30mg given po as scheduled. Sleeping well. Up to bathroom via wheelchair to void at [address] Benadryl 25 mg given po for c/o insomnia.

## 2017-11-16 NOTE — PROGRESS NOTES
11/16/17 1414   Time Spent With Patient   Time In [phone]   Time Out 1413   Patient Seen For: PM ;Neuro-linguistics   Mental Status   Neurologic State Alert   Orientation Level Oriented X4   Cognition Appropriate decision making   Perception Appears intact   Perseveration No perseveration noted   Safety/Judgement Fall prevention   Pt completed problem solving and word finding tasks with 90% accuracy.    Miguelangel Harrison MA/CCC/SLP

## 2017-11-16 NOTE — PROGRESS NOTES
11/16/17 1145   Time Spent With Patient   Time In 0830   Time Out 0914   Patient Seen For: AM;ADLs   Grooming   Grooming Assistance  SBA   Comments standing at sink with RW    Upper Body Bathing   Bathing Assistance, Upper Mod I   Lower Body Bathing   Bathing Assistance, [address] bar   Position Performed Standing; Other (comment)  (seated on tub transfer bench )   Adaptive Equipment Tub bench   Comments verbal cue for upright posture and to use grab bar for safety    Toileting   Toileting Assistance (FIM Score) S  (SBA)   Upper Body Dressing    Dressing Assistance  S   Comments setup assist    Lower Body Dressing    Dressing Assistance  SBA   Functional Transfers   Toilet Transfer  Stand pivot transfer with walker   Amount of Assistance Required SBA   Tub or Shower Type Shower   Amount of Assistance Required CGA   Adaptive Equipment Tub transfer bench;Walker (comment);Grab bars   S: \"I wish I could just sleep. \"   O: Patient presented seated up in recliner. Agreeable to treatment. Patient completed ADL; see above for FIMs. Educated patient on use of grab bar in standing in shower with fair to good carryover; would benefit from further training. A: Progressing well. Remains limited by impaired dynamic balance in standing. Patient tolerated session well with no complaint of pain. P: Continue OT POC with focus on ADL/IADL skills, functional transfers, functional mobility, coordination, strength, static and dynamic balance, and activity tolerance to maximize safety and independence with ADLs and functional transfers. Patient was left seated up in recliner with call bell/all other needs in reach. Interdisciplinary communication: Collaborated with PT and confirmed that patient is on track to meet goals.      Teodora Leigh MS, OTR/L  11/16/2017

## 2017-11-16 NOTE — PROGRESS NOTES
PHYSICAL THERAPY DAILY NOTE  Time In: 4554  Time Out: 1034  Patient Seen For: PM;Other (see progress notes);Gait training; Therapeutic exercise;Transfer training    Subjective: Patient had no complaints. Objective: No pain noted. Other (comment) (Fall risk)  GROSS ASSESSMENT Daily Assessment            BED/MAT MOBILITY Daily Assessment    Supine to Sit : 5 (Supervision)  Sit to Supine : 5 (Supervision)       TRANSFERS Daily Assessment    Transfer Type: SPT with walker  Transfer Assistance : 4 (Contact guard assistance)  Sit to Stand Assistance: Contact guard assistance       GAIT Daily Assessment    Amount of Assistance: 4 (Contact guard assistance)  Distance (ft): 150 Feet (ft)  Assistive Device: Walker, rolling;Gait belt       STEPS or STAIRS Daily Assessment    Steps/Stairs Ambulated (#): 12  Level of Assist : 0 (Not tested)  Rail Use: Left        BALANCE Daily Assessment            WHEELCHAIR MOBILITY Daily Assessment            LOWER EXTREMITY EXERCISES Daily Assessment    Extremity: Both  Exercise Type #1: Seated lower extremity strengthening  Sets Performed: 2  Reps Performed: 10  Level of Assist: Supervision          Assessment: Patient making good progress. Plan of Care: Continue with plan of care to reach PT goals. Returned to room with call colin at reach.     Jodi Hoyos, PTA  11/16/2017

## 2017-11-16 NOTE — PROGRESS NOTES
PHYSICAL THERAPY DAILY NOTE  Time In: 1115  Time Out: 3438  Patient Seen For: AM;Gait training; Other (see progress notes); Therapeutic exercise;Transfer training    Subjective: Patient had no complaints. Objective: No pain noted. He seemed very down today. Other (comment) (Fall risk)  GROSS ASSESSMENT Daily Assessment            BED/MAT MOBILITY Daily Assessment    Supine to Sit : 5 (Supervision)  Sit to Supine : 5 (Supervision)       TRANSFERS Daily Assessment    Transfer Type: SPT with walker  Transfer Assistance : 4 (Contact guard assistance)  Sit to Stand Assistance: Contact guard assistance       GAIT Daily Assessment    Amount of Assistance: 4 (Contact guard assistance)  Distance (ft): 150 Feet (ft)  Assistive Device: Walker, rolling       STEPS or STAIRS Daily Assessment    Steps/Stairs Ambulated (#): 12  Level of Assist : 4 (Contact guard assistance)  Rail Use: Left        BALANCE Daily Assessment            WHEELCHAIR MOBILITY Daily Assessment            LOWER EXTREMITY EXERCISES Daily Assessment    Extremity: Both  Exercise Type #1: Supine lower extremity strengthening  Sets Performed: 2  Reps Performed: 10  Level of Assist: Supervision          Assessment: Patient making good progress. Plan of Care: Continue with plan of care to reach PT goals. Returned to room with call bell at reach and lunch set up.     Denisha Helms, PTA  11/16/2017

## 2017-11-16 NOTE — PROGRESS NOTES
End Of Shift Functional Summary, Nursing      TOILETING:  Does patient need assist with clothing management and/or pericare? No    TOILET TRANSFER:  Pt requires minimal assistance. Pt uses walker. BLADDER:  Pt does not have a plunkett catheter that staff manages. Pt does not take medication. Pt is continent. of bladder and voids in toilet Pt has had 0 bladder accidents during this shift  (An accident is when the episode is not contained in a brief AND/OR the clothing/linen requires changing/cleaning up.)        BED/CHAIR TRANSFER  Pt requires minimal assistance. Patient requires the assistance of 1 staff member(s). Pt uses walker      EATING  Pt requires no assistance. Pt does not wear dentures. TUBE FEEDINGS:  Pt does not  receive nutrition through tube feedings. Documentation reviewed and plan of care discussed/reviewed with   patient assistant during the shift.

## 2017-11-17 LAB
GLUCOSE BLD STRIP.AUTO-MCNC: 114 MG/DL (ref 65–100)
GLUCOSE BLD STRIP.AUTO-MCNC: 152 MG/DL (ref 65–100)
GLUCOSE BLD STRIP.AUTO-MCNC: 221 MG/DL (ref 65–100)
GLUCOSE BLD STRIP.AUTO-MCNC: 86 MG/DL (ref 65–100)

## 2017-11-17 PROCEDURE — 65310000000 HC RM PRIVATE REHAB

## 2017-11-17 PROCEDURE — 74011250637 HC RX REV CODE- 250/637: Performed by: PHYSICAL MEDICINE & REHABILITATION

## 2017-11-17 PROCEDURE — 92507 TX SP LANG VOICE COMM INDIV: CPT

## 2017-11-17 PROCEDURE — 97116 GAIT TRAINING THERAPY: CPT

## 2017-11-17 PROCEDURE — 97530 THERAPEUTIC ACTIVITIES: CPT

## 2017-11-17 PROCEDURE — 82962 GLUCOSE BLOOD TEST: CPT

## 2017-11-17 PROCEDURE — 97535 SELF CARE MNGMENT TRAINING: CPT

## 2017-11-17 PROCEDURE — 99232 SBSQ HOSP IP/OBS MODERATE 35: CPT | Performed by: PHYSICAL MEDICINE & REHABILITATION

## 2017-11-17 PROCEDURE — 97110 THERAPEUTIC EXERCISES: CPT

## 2017-11-17 PROCEDURE — 74011636637 HC RX REV CODE- 636/637: Performed by: PHYSICAL MEDICINE & REHABILITATION

## 2017-11-17 RX ADMIN — SUCRALFATE 1 G: 1 TABLET ORAL at 12:16

## 2017-11-17 RX ADMIN — INSULIN LISPRO 4 UNITS: 100 INJECTION, SOLUTION INTRAVENOUS; SUBCUTANEOUS at 21:24

## 2017-11-17 RX ADMIN — METFORMIN HYDROCHLORIDE 500 MG: 500 TABLET, FILM COATED ORAL at 08:27

## 2017-11-17 RX ADMIN — SUCRALFATE 1 G: 1 TABLET ORAL at 05:28

## 2017-11-17 RX ADMIN — ESCITALOPRAM OXALATE 10 MG: 10 TABLET ORAL at 08:28

## 2017-11-17 RX ADMIN — LORATADINE 10 MG: 10 TABLET ORAL at 17:16

## 2017-11-17 RX ADMIN — PANTOPRAZOLE SODIUM 40 MG: 40 TABLET, DELAYED RELEASE ORAL at 05:28

## 2017-11-17 RX ADMIN — DOCUSATE SODIUM 100 MG: 100 CAPSULE, LIQUID FILLED ORAL at 08:27

## 2017-11-17 RX ADMIN — GLIPIZIDE 20 MG: 5 TABLET ORAL at 08:28

## 2017-11-17 RX ADMIN — AMLODIPINE BESYLATE 5 MG: 5 TABLET ORAL at 08:28

## 2017-11-17 RX ADMIN — ATORVASTATIN CALCIUM 80 MG: 40 TABLET, FILM COATED ORAL at 08:28

## 2017-11-17 RX ADMIN — METFORMIN HYDROCHLORIDE 500 MG: 500 TABLET, FILM COATED ORAL at 17:16

## 2017-11-17 RX ADMIN — PANTOPRAZOLE SODIUM 40 MG: 40 TABLET, DELAYED RELEASE ORAL at 17:16

## 2017-11-17 RX ADMIN — FINASTERIDE 5 MG: 5 TABLET, FILM COATED ORAL at 08:28

## 2017-11-17 RX ADMIN — ASPIRIN 81 MG CHEWABLE TABLET 81 MG: 81 TABLET CHEWABLE at 08:28

## 2017-11-17 RX ADMIN — FLUTICASONE PROPIONATE 2 SPRAY: 50 SPRAY, METERED NASAL at 08:32

## 2017-11-17 RX ADMIN — SUCRALFATE 1 G: 1 TABLET ORAL at 17:16

## 2017-11-17 RX ADMIN — INSULIN LISPRO 2 UNITS: 100 INJECTION, SOLUTION INTRAVENOUS; SUBCUTANEOUS at 12:16

## 2017-11-17 RX ADMIN — TEMAZEPAM 30 MG: 15 CAPSULE ORAL at 21:24

## 2017-11-17 RX ADMIN — SUCRALFATE 1 G: 1 TABLET ORAL at 21:24

## 2017-11-17 RX ADMIN — HYDROCHLOROTHIAZIDE: 12.5 CAPSULE ORAL at 08:28

## 2017-11-17 RX ADMIN — TAMSULOSIN HYDROCHLORIDE 0.4 MG: 0.4 CAPSULE ORAL at 08:28

## 2017-11-17 RX ADMIN — METFORMIN HYDROCHLORIDE 500 MG: 500 TABLET, FILM COATED ORAL at 12:16

## 2017-11-17 NOTE — PROGRESS NOTES
End Of Shift Functional Summary, Nursing        TOILETING:  Does patient need assist with clothing management and/or pericare? No     TOILET TRANSFER:  Pt requires minimal assistance. Pt uses walker.     BLADDER:  Pt does not have a plunkett catheter that staff manages. Pt does take medication. Pt is continent. of bladder and voids in toilet  Pt has had 0 bladder accidents during this shift.  (An accident is when the episode is not contained in a brief AND/OR the clothing/linen requires changing/cleaning up.)     BOWEL:  Pt does take medication. Pt is continent of bowel and uses toilet. Pt has had 0 bowel accidents during this shift. (An accident is when the episode is not contained in a brief AND/OR the clothing/linen requires changing/cleaning up.)     BED/CHAIR TRANSFER  Pt requires minimal assistance. Patient requires the assistance of 1 staff member(s). Pt uses walker     EATING  Pt requires no assistance. Pt does not wear dentures. TUBE FEEDINGS:  Pt does not  receive nutrition through tube feedings. Patient requires no assistance with feedings.        Documentation reviewed and plan of care discussed/reviewed with   oncoming nurse during the shift.

## 2017-11-17 NOTE — PROGRESS NOTES
Pt up in recliner watching tv. Denies needs or concerns at this time. Alert and oriented x4. Lungs sounds clear bilaterally with respirations even and unlabored. Bowel sounds active in all quadrants. Palpable pulses bilaterally in upper and lower extremities. RSW noted. Instructed to call for assistance. Call light in reach. Voiced understanding and identified call light.

## 2017-11-17 NOTE — PROGRESS NOTES
Patient had an uneventful night without complaint. Restoril was moderately effective, without side effects. Ambulated to the toilet with assistance several times during the night. Hourly rounds were performed throughout the shift. All needs met at this time. Report given to oncoming nurse.

## 2017-11-17 NOTE — PROGRESS NOTES
PHYSICAL THERAPY DAILY NOTE  Time In: 1115  Time Out: 1200  Patient Seen For: AM;Balance activities;Gait training; Therapeutic exercise    Subjective: \"I have not been sleeping well and I am very sleepy\"         Objective:  Vital Signs:    Patient Vitals for the past 12 hrs:   Temp Pulse Resp BP SpO2   11/17/17 0726 97.8 °F (36.6 °C) 72 12 150/81 96 %       Pain level:  Patient had no complaints of pain in therapy. Patient education: Safety awareness and fall precaution. Other (comment) (Fall risk)    BED/MAT MOBILITY Daily Assessment    Rolling Right : 6 (Modified independent)  Rolling Left : 6 (Modified independent)  Supine to Sit : 5 (Supervision)  Sit to Supine : 5 (Supervision)       TRANSFERS Daily Assessment   Steady and controlled with gait. Transfer Type: SPT with walker  Other: toilet  Transfer Assistance : 4 (Contact guard assistance)  Sit to Stand Assistance: Supervision  Car Transfers: Not tested       GAIT Daily Assessment   Attempted gait with several devices ie. .. Katherene Means Katherene Means Katherene Means Rollator - harder for him to control;  Straight cane - not enough support; quad cane - awkward for the patient to use. No gait device with use of the gait belt - patient can manage short distances ie. .. 10-15' with supervision. Amount of Assistance: 4 (Contact guard assistance)  Distance (ft): 150 Feet (ft) (150 x 3)  Assistive Device: Walker, rolling       STEPS or STAIRS Daily Assessment   Patient needs verbal cues for proper sequencing. He can do step over step but is safer with 1 step at a time.   Steps/Stairs Ambulated (#): 8  Level of Assist : 5 (Supervision/setup)  Rail Use: Both       BALANCE Daily Assessment    Sitting - Static: Good (unsupported)  Sitting - Dynamic: Good (unsupported)  Standing - Static: Good  Standing - Dynamic : Impaired         LOWER EXTREMITY EXERCISES Daily Assessment    Extremity: Both  Exercise Type #1: Supine lower extremity strengthening (2 # for SAQ and SLR)  Sets Performed: 2  Reps Performed: 10  Level of Assist: Supervision  Exercise Type #2: Standing lower extremity strengthening (gait practice over carpet and ramps)  Level of Assist: Contact guard assistance          Assessment: Patient feels the r/walker provides him the most supportive and steady gait although he can manage gait without an assistive device for short distances 10'-12' with supervision. Patient reports he has not really been sleeping well since he has been here. Patient was ambulated to his room with the r/walker with CGA and was assisted to the bathroom and then ambulated back to his recliner with the call light within reach. Plan of Care: Continue to progress as indicated.      Jamel Vidal  11/17/2017

## 2017-11-17 NOTE — PROGRESS NOTES
11/17/17 0953   Time Spent With Patient   Time In [id number]   Time Out 0967   Patient Seen For: AM;Neuro-linguistics   Team Conference   Team Conference Date 11/16/17   Family/Caregiver Training   Family Training Needs to be scheduled   Mental Status   Neurologic State Alert   Orientation Level Oriented X4   Cognition Appropriate decision making   Perception Appears intact   Perseveration No perseveration noted   Safety/Judgement Fall prevention   Comprehension (Native Language)   Primary Mode of Comprehension Auditory   Score 7   Expression (Native Language)   Score 7   Social Interaction/Pragmatics   Score 7   Problem Solving   Score 6   Memory   Score 7   Pt making progress toward all his goals, however, would benefit from continued ST services 5 times a week to address oral motor weakness and mild cognitive impairments. Pt completed problem solving tasks with 90% accuracy.     Aline Dennison MA/JAJA/SLP

## 2017-11-17 NOTE — PROGRESS NOTES
PT WEEKLY PROGRESS NOTE   Time In: 3186   Time Out: 0916    Subjective: \"I try to do everything I am asked to do to get better\"         Objective: Vital Signs:    Patient Vitals for the past 12 hrs:   Temp Pulse Resp BP SpO2   11/17/17 0726 97.8 °F (36.6 °C) 72 12 150/81 96 %       Pain level:  Patient had no complaints of pain in therapy this morning. Patient education: Continue with patient education regarding gait training with RW,energy conservation techniques and fall precautions    Interdisciplinary Communication:  Discussed current d/c date with other clinician     Other (comment) (Fall risk)    Outcome Measures:      AROM:  BLE AROM are [facility]    FIM SCORES Initial Assessment Weekly Progress Assessment 11/17/2017   Bed/Chair/Wheelchair Transfers 4 5   Wheelchair Mobility 5 5   Walking [place] 1 4   Steps/Stairs 0 (Secondary to decreased dynamic standing balance.) 4   Please see [facility] Interdisciplinary Eval: Coordination/Balance Section for details regarding FIM score description. BED/CHAIR/WHEELCHAIR TRANSFERS Initial Assessment Weekly Progress Assessment 11/17/2017   Rolling Right 0 (Not tested) 6 (Modified independent)   Rolling Left 0 (Not tested) 6 (Modified independent)   Supine to Sit 0 (Not tested) 5 (Supervision)   Sit to Stand Minimal assistance Supervision   Sit to Supine 0 (Not tested) 5 (Supervision)   Transfer Type SPT with walker SPT with walker   Comments Lifting and steadying assist.  Improved safety with SPT with and without an assistive device.    Car Transfer Not tested Not tested   Car Type         GROSS ASSESSMENT Weekly Progress Assessment 11/17/2017   AROM  [facility]   Strength  WFL   Coordination  WFL   Tone  Normal   Sensation  Intact   PROM  [facility]     POSTURE Weekly Progress Assessment 11/17/2017   Posture (WDL)     Posture Assessment       WHEELCHAIR MOBILITY/MANAGEMENT Initial Assessment Weekly Progress Assessment 11/17/2017   Able to Propel 165 feet 165 feet   Curbs/ramps assistance required 0 (Not tested) 0 (Not tested)   Wheelchair set up assistance required 0 (Not tested)  NT   Wheelchair management Manages left brake, Manages right brake Manages left brake;Manages right brake     WALKING INDEPENDENCE Initial Assessment Weekly Progress Assessment 11/17/2017   Assistive device Walker, rolling Walker, rolling   Ambulation assistance - level surface 3 (Moderate assistance)  4   Distance 20 Feet (ft) 250 Feet (ft)   Comments Patient ambulated with decreased step length B LE, increased postural sway, altered center of gravity Improved safety and gait speed with more upright posture. RLE step length improved with less path deviations using r/walker     GAIT Weekly Progress Assessment 11/17/2017   Gait Description (WDL) Exceptions to WDL   Gait Abnormalities  RLE slightly decreased control when fatigued     STEPS/STAIRS Initial Assessment Weekly Progress Assessment 11/17/2017   Steps/Stairs ambulated 0 12   Rail Use Both Both   Comments    Continues to step 1 step at a time with use of rails. Curbs/Ramps    CGA           Assessment: Patient continues to make steady progress with strength and functional mobility. His overall safety has improved with transfers and gait. Plan of Care: Please see Care Plan for updated LTGs.     Family Training: Needs to be scheduled    Niko Solis, PT  11/17/2017

## 2017-11-17 NOTE — PROGRESS NOTES
OT Daily Note    Time In 1305   Time Out 1345     Subjective: \"I'll try to shave. \"  Pain: None indicated    Precautions: Other (comment) (fall risk)    Self-Care   Patient ambulated ~20 feet using a RW with CGA from recliner to toilet. Patient completed toileting standing at toilet using RW with CGA/close SBA for dynamic standing balance. Patient completed functional RUE coordination/standing activity tolerance task at sink with CGA/close SBA x ~15 minutes to complete shaving. Coordination   Patient completed RUE strengthening/FMC task seated at tabletop. Patient used B hands to manipulate soft (yellow) Theraputty, locate and retrieve x 20 medium sized beads using R hand (repeated cues to recall use of R hand to manipulate beads) and replace into container on tabletop. Assessment: Patient tolerated well    Education: Purpose of therapy  Interdisciplinary Communication: Collaborated with Meghan Overall and agreed patient is progressing well and on-track to meet goals as stated in POC. Plan: Continue to address ADL/IADL, functional mobility, activity tolerance, balance, strengthening, coordination, education, cognition.       Ana Roa, OTR/L

## 2017-11-17 NOTE — PROGRESS NOTES
Problem: Falls - Risk of  Goal: *Absence of Falls  Document Jaiden Fall Risk and appropriate interventions in the flowsheet.    Outcome: Progressing Towards Goal  Fall Risk Interventions:  Mobility Interventions: Patient to call before getting OOB    Mentation Interventions: Adequate sleep, hydration, pain control    Medication Interventions: Patient to call before getting OOB    Elimination Interventions: Call light in reach    History of Falls Interventions: Consult care management for discharge planning, Bed/chair exit alarm

## 2017-11-17 NOTE — PROGRESS NOTES
OT WEEKLY PROGRESS NOTE    Time In: 0743  Time Out: 0929    COMPREHENSION MODE Initial Assessment Weekly Progress Assessment 11/17/2017   Score 7 7     EXPRESSION Initial Assessment Weekly Progress Assessment 11/17/2017   Primary Mode of Expression Verbal Verbal   Score 5 7   Comments some difficulty with articulation; had to repeat some words        SOCIAL INTERACTION/ PRAGMATICS Initial Assessment Weekly Progress Assessment 11/17/2017   Score 7 7   Comments very pleasant  very pleasant and cooperative      PROBLEM SOLVING Initial Assessment Weekly Progress Assessment 11/17/2017   Score 6 6   Comments         MEMORY Initial Assessment Weekly Progress Assessment 11/17/2017   Score 5 7   Comments difficulty recalling people frequently seen; recalled 3/3 words       EATING Initial Assessment Weekly Progress Assessment 11/17/2017   Functional Level 5 7   Comments verbal cues to use RUE; setup assist to open packages        GROOMING Initial Assessment Weekly Progress Assessment 11/17/2017   Functional Level 5 5   Tasks completed by patient Washed face, Washed hands Washed face; Washed hands   Comments setup        BATHING Initial Assessment Weekly Progress Assessment 11/17/2017   Functional Level 4 5   Body parts patient bathed Abdomen, Arm, left, Arm, right, Buttocks, Chest, Lower leg and foot, left, Mena area, Lower leg and foot, right, Thigh, left, Thigh, right Abdomen;Arm, left;Arm, right;Chest;Lower leg and foot, left; Buttocks; Lower leg and foot, right;Mena area; Thigh, left; Thigh, right   Comments CGA when standing for mena care  SBA when standing for mena care; verbal cues to use grab bar and for upright posture      TUB/SHOWER TRANSFER INDEPENDENCE Initial Assessment Weekly Progress Assessment 11/17/2017   Score 4 5   Comments CGA with RW  SBA with RW      UPPER BODY DRESSING/UNDRESSING Initial Assessment Weekly Progress Assessment 11/17/2017   Functional Level 5 5   Items applied/Steps completed Pullover (4 steps) Pullover (4 steps)   Comments setup assist; able to identify and correct problem of putting RUE through head hole  setup assist      LOWER BODY DRESSING/UNDRESSING Initial Assessment Weekly Progress Assessment 11/17/2017   Functional Level 4 5   Items applied/Steps completed Elastic waist pants (3 steps), Sock, left (1 step), Sock, right (1 step), Underpants (3 steps) Elastic waist pants (3 steps); Sock, left (1 step); Sock, right (1 step); Underpants (3 steps); Fasten shoe (1 step); Shoe, left (1 step); Shoe, right (1 step)   Comments CGA when standing for clothing management up        TOILETING Initial Assessment Weekly Progress Assessment 11/17/2017   Functional Level 5 5   Comments SBA SBA with RW      TOILET TRANSFER INDEPENDENCE Initial Assessment Weekly Progress Assessment 11/17/2017   Transfer score 4 5   Comments CGA with RW  SBA with RW      Plan of Care: Please see Care Plan for updated LTGs. Family Training:  needs to be scheduled    Summary of Progress: Patient has made great progress with ADL skills, functional transfers, functional mobility, RUE fine and gross motor coordination, activity tolerance, and strength. Patient remains limited by decreased RUE fine motor coordination, decreased dynamic balance, and decreased upright posture. Summary of Session: Patient seated up in recliner on arrival to room. Agreeable to OT. Completed ADL; see above for FIMs. Patient tolerated session well with no complaint of pain and was left seated up in recliner. Patient is making good progress towards all LTGs and it is anticipated that he will meet all goals by discharge.      Cookie Siddiqi MS, OTR/L  11/17/2017

## 2017-11-17 NOTE — PROGRESS NOTES
Mike Khoury MD,   Medical Director  [address] [facility], [address] [facility]  Tel: [phone]       SFD PROGRESS NOTE    Bg Jerry  Admit Date: 11/9/2017  Admit Diagnosis: Left CVA; Stroke (cerebrum) (HCC)    Subjective     Doing well x very perseverative on not being able to sleep at noc. This is a daily discussion. Spoke with RN and PCT and both report that he slept all noc!! Pt received his Restoril and no Benadryl needed. Pt seems to ruminate on this topic.  No pain, headache, nausea or dizziness    Objective:     Current Facility-Administered Medications   Medication Dose Route Frequency    escitalopram oxalate (LEXAPRO) tablet 10 mg  10 mg Oral DAILY    temazepam (RESTORIL) capsule 30 mg  30 mg Oral QHS    diphenhydrAMINE (BENADRYL) capsule 25 mg  25 mg Oral QHS PRN    amLODIPine (NORVASC) tablet 5 mg  5 mg Oral DAILY    alum-mag hydroxide-simeth (MYLANTA) oral suspension 30 mL  30 mL Oral Q4H PRN    magnesium hydroxide (MILK OF MAGNESIA) 400 mg/5 mL oral suspension 30 mL  30 mL Oral DAILY PRN    acetaminophen (TYLENOL) tablet 650 mg  650 mg Oral Q6H PRN    aspirin chewable tablet 81 mg  81 mg Oral DAILY    hydrALAZINE (APRESOLINE) tablet 25 mg  25 mg Oral QID PRN    docusate sodium (COLACE) capsule 100 mg  100 mg Oral DAILY    bisacodyl (DULCOLAX) suppository 10 mg  10 mg Rectal DAILY PRN    pantoprazole (PROTONIX) tablet 40 mg  40 mg Oral ACB&D    atorvastatin (LIPITOR) tablet 80 mg  80 mg Oral DAILY    finasteride (PROSCAR) tablet 5 mg  5 mg Oral DAILY    glipiZIDE (GLUCOTROL) tablet 20 mg  20 mg Oral ACB    benazepril/hydroCHLOROthiazide (LOTENSIN HCT) 10/12.5 mg   Oral DAILY    fluticasone (FLONASE) 50 mcg/actuation nasal spray 2 Spray  2 Spray Both Nostrils DAILY    loratadine (CLARITIN) tablet 10 mg  10 mg Oral QPM    sucralfate (CARAFATE) tablet 1 g  1 g Oral AC&HS    tamsulosin (FLOMAX) capsule 0.4 mg  0.4 mg Oral DAILY  metFORMIN (GLUCOPHAGE) tablet 500 mg  500 mg Oral TID WITH MEALS    insulin lispro (HUMALOG) injection   SubCUTAneous AC&HS    ondansetron (ZOFRAN ODT) tablet 4 mg  4 mg Oral Q6H PRN     Review of Systems:Denies chest pain, shortness of breath, cough, headache, visual problems, abdominal pain, dysurea, calf pain. Pertinent positives are as noted in the medical records and unremarkable otherwise. Visit Vitals    /81    Pulse 72    Temp 97.8 °F (36.6 °C)    Resp 12    Ht 6' 2\" (1.88 m)    Wt 197 lb (89.4 kg)    SpO2 96%    BMI 25.29 kg/m2        Physical Exam:   General: Alert and age appropriately oriented. No acute cardio respiratory distress. HEENT: Normocephalic,no scleral icterus  Oral mucosa moist without cyanosis   Lungs: Clear to auscultation  bilaterally. Respiration even and unlabored   Heart: Regular rate and rhythm, S1, S2   No  murmurs, clicks, rub or gallops   Abdomen: Soft, non-tender, nondistended. Bowel sounds present. No organomegaly. Genitourinary: Benign . Neuromuscular:      Dysarthric  Rue 4/ 5 ; right pronator drift  RLE sens intact; grossly 4+/5-  Follows commands well. Slightly [place]   Skin/extremity: No rashes, no erythema.  No calf tenderness BLE  No peripheral edema                                                                            Functional Assessment:          Balance  Sitting - Static: Good (unsupported) (11/11/17 1100)  Sitting - Dynamic: Good (unsupported) (11/11/17 1100)  Standing - Static: Good;Constant support (11/11/17 1100)  Standing - Dynamic : Impaired (11/11/17 1100)           Toileting  Cues:  (SBA for balance and safety) (11/11/17 1231)  Adaptive Equipment: Elevated seat;Grab bars (11/15/17 1008)         Jaiden Fall Risk Assessment:  Cintiaenda Silverio Fall Risk  Mobility: Ambulates or transfers with assist devices or assistance/unsteady gait (11/16/17 2040)  Mobility Interventions: Patient to call before getting OOB (11/16/17 2040)  Mentation: Alert, oriented x 3 (11/16/17 2040)  Mentation Interventions: Adequate sleep, hydration, pain control (11/16/17 2040)  Medication: Patient receiving anticonvulsants, sedatives(tranquilizers), psychotropics or hypnotics, hypoglycemics, narcotics, sleep aids, antihypertensives, laxatives, or diuretics (11/16/17 2040)  Medication Interventions: Patient to call before getting OOB (11/16/17 2040)  Elimination: Needs assistance with toileting (11/16/17 2040)  Elimination Interventions: Call light in reach (11/16/17 2040)  Prior Fall History: No (11/16/17 2040)  History of Falls Interventions: Consult care management for discharge planning;Bed/chair exit alarm (11/16/17 1345)  Total Score: 3 (11/16/17 2040)  Standard Fall Precautions: Yes (11/09/17 1132)  High Fall Risk: Yes (11/16/17 2040)     Speech Assessment:  Aspiration Signs/Symptoms: Delayed cough/throat clear (11/10/17 0910)      Ambulation:  Gait  Distance (ft): 150 Feet (ft) (11/16/17 1618)  Assistive Device: Connee Simmer[place], rolling;Gait belt (11/16/17 1618)  Rail Use: Left  (11/16/17 1207)     Labs/Studies:  Recent Results (from the past 72 hour(s))   GLUCOSE, POC    Collection Time: 11/14/17 11:20 AM   Result Value Ref Range    Glucose (POC) 79 65 - 100 mg/dL   GLUCOSE, POC    Collection Time: 11/14/17  3:43 PM   Result Value Ref Range    Glucose (POC) 44 (L) 65 - 100 mg/dL   GLUCOSE, POC    Collection Time: 11/14/17  4:26 PM   Result Value Ref Range    Glucose (POC) 79 65 - 100 mg/dL   GLUCOSE, POC    Collection Time: 11/14/17  8:51 PM   Result Value Ref Range    Glucose (POC) 148 (H) 65 - 100 mg/dL   GLUCOSE, POC    Collection Time: 11/15/17  7:12 AM   Result Value Ref Range    Glucose (POC) 117 (H) 65 - 100 mg/dL   GLUCOSE, POC    Collection Time: 11/15/17 10:52 AM   Result Value Ref Range    Glucose (POC) 104 (H) 65 - 100 mg/dL   GLUCOSE, POC    Collection Time: 11/15/17  4:08 PM   Result Value Ref Range    Glucose (POC) 77 65 - 100 mg/dL   GLUCOSE, POC Collection Time: 11/15/17  9:10 PM   Result Value Ref Range    Glucose (POC) 171 (H) 65 - 100 mg/dL   GLUCOSE, POC    Collection Time: 11/16/17  7:07 AM   Result Value Ref Range    Glucose (POC) 110 (H) 65 - 100 mg/dL   GLUCOSE, POC    Collection Time: 11/16/17 11:25 AM   Result Value Ref Range    Glucose (POC) 116 (H) 65 - 100 mg/dL   GLUCOSE, POC    Collection Time: 11/16/17  4:26 PM   Result Value Ref Range    Glucose (POC) 95 65 - 100 mg/dL   GLUCOSE, POC    Collection Time: 11/16/17  8:32 PM   Result Value Ref Range    Glucose (POC) 124 (H) 65 - 100 mg/dL   GLUCOSE, POC    Collection Time: 11/17/17  7:10 AM   Result Value Ref Range    Glucose (POC) 114 (H) 65 - 100 mg/dL       Assessment:     Problem List as of 11/17/2017  Date Reviewed: 10/23/2017          Codes Class Noted - Resolved    Stroke (cerebrum) ([facility] [place] [facility] ICD-10-CM: I63.9  ICD-9-CM: 434.91  11/9/2017 - Present        Abdominal bloating ICD-10-CM: R14.0  ICD-9-CM: 787.3  10/23/2017 - Present        Nausea ICD-10-CM: R11.0  ICD-9-CM: 787.02  8/15/2017 - Present        Fatty liver ICD-10-CM: K76.0  ICD-9-CM: 571.8  6/28/2017 - Present        Lower abdominal pain ICD-10-CM: R10.30  ICD-9-CM: 789.09  6/19/2017 - Present        Neck pain ICD-10-CM: M54.2  ICD-9-CM: 723.1  12/27/2016 - Present        GERD (gastroesophageal reflux disease) ICD-10-CM: K21.9  ICD-9-CM: 530.81  6/23/2016 - Present        Type 2 diabetes mellitus ([facility] [place] [facility] ICD-10-CM: E11.9  ICD-9-CM: 250.00  2/25/2015 - Present        Allergic rhinitis ICD-10-CM: J30.9  ICD-9-CM: 477.9  2/25/2015 - Present        HLD (hyperlipidemia) ICD-10-CM: E78.5  ICD-9-CM: 272.4  2/25/2015 - Present        Enlarged prostate with lower urinary tract symptoms (LUTS) ICD-10-CM: N40.1  ICD-9-CM: 600.01  2/25/2015 - Present        Essential hypertension ICD-10-CM: I10  ICD-9-CM: 401.9  2/25/2015 - Present        Asthma ICD-10-CM: J45.909  ICD-9-CM: 493.90  2/25/2015 - Present        RESOLVED: Delusional disorder ([facility] ICD-10-CM: F22  ICD-9-CM: 297.1  2/25/2015 - 6/28/2017        RESOLVED: Backache, unspecified ICD-10-CM: M54.9  ICD-9-CM: 724.5  2/25/2015 - 10/23/2017        RESOLVED: Urinary tract infection, site not specified ICD-10-CM: N39.0  ICD-9-CM: 599.0  2/25/2015 - 10/23/2017            [place] Infarct, ischemic          Continue daily physician medical management:  Pneumonia prophylaxis- Incentive spirometer every hour while awake      Ischemic CVA; cont asa and lipitor; permissive htn      DVT risk / DVT Prophylaxis- Will require daily physician exam to assess for signs and symptoms as patient is at increased risk for of thromboembolism. Mobilization as tolerated. Intermittent pneumatic compression devices when in bed Thigh-high or knee-high thromboembolic deterrent hose when out of bed. Add subcut heparin       Pain Control: no current joint or muscle symptoms, essentially pain-free. Will require regular pain assessment and comprenhensive pain management,       Hypertension - BP uncontrolled, fluctuating, managed medically. Continue Lotensin and add prn hydralazine. His SBP upon arrival was >220 and DBP >110   monitor closely, consider adding metoprolol; want permissive htn for cerebral perfusion but goal 120-140s  -11/10 174/86 add norvasc  - 11/11 SBP- 143 this am, continues to improve, on norvasc and lotensin  -11/16 146/74; permissive htn ok      Diabetes mellitus - Uncontrolled. poor glycemic control. Will require daily, close FSG monitoring and medication adjustment to optimize glycemic control in setting of acute illness and hospitalization. Cont metformin and glucotrol 20mg. Add SSI.  Cont carb restricted diet, accuchecks qac and qhs  -11/10 BS fine  - BG - 121 this am, yesterday 121- 165 range  -11/14 consider changing to bid bs  -11/15 bs of 44 yesterday; 117 this am.; consider dropping glucotrol dose  -11/16 bs 110 this a.m ; no hypoglycemia past 24 hrs  -11/17 -171 acceptable      Urinary retention/ neurogenic bladder - schedule voids q6-8 hrs. Check post-void residual as needed; In and Out catheterize if post-void residual is more than 400 cc. Pt with hx of prostate issues . Cont flomax and proscar  11/17 no issues      bowel program - add colace and prn bowel program      GERD - resume PPI. At times may need additional antacids, Maalox prn. On omeprazole which is nonformulary, thus change to protonix 40mg bid.  -H pylori; cont Biaxin and amoxil and carafate. Will treat with dual abx for total of 2 weeks. Will f/u with GI post dc  - no gi complaints  -11/17 abx dc'd yesterday; cont GI prophlaxis      Asthma/allergic rhinitis; cont flonase and zyrtec.       Sleep mgmt-  will continue to increase prn trazodone to 100mg  -11/13 had difficult noc with restlessness, confusion; dc trazadone and try benadryl and restoril; monitor for confusion; could consider remeron  -11/14 a bit better noc; 11/15 cont current plan  -11/17 confirmed with staff that he IS sleeping and that he perseverates on thinking he did not. 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      Signed By: Jesica Plummer MD     November 17, 2017

## 2017-11-17 NOTE — PROGRESS NOTES
Problem: Falls - Risk of  Goal: *Absence of Falls  Document Jaiden Fall Risk and appropriate interventions in the flowsheet.    Outcome: Progressing Towards Goal  Fall Risk Interventions:  Mobility Interventions: Patient to call before getting OOB, Strengthening exercises (ROM-active/passive), Utilize walker, cane, or other assitive device    Mentation Interventions: Adequate sleep, hydration, pain control, Increase mobility    Medication Interventions: Bed/chair exit alarm, Evaluate medications/consider consulting pharmacy, Patient to call before getting OOB    Elimination Interventions: Call light in reach, Patient to call for help with toileting needs, Urinal in reach    History of Falls Interventions: Consult care management for discharge planning

## 2017-11-17 NOTE — PROGRESS NOTES
OT Daily Note  Time In 1030   Time Out 1115     Subjective: \"I'm just tired. \"   Pain: none reported  Education: techniques for kitchen management, rolling walker management while gathering items   Interdisciplinary Communication: nurse aware that patient out to therapy   Precautions: Other (comment) (fall risk)  Location on arrival: up to OT table     IADL  Daily Assessment   Patient completed simulated kitchen tasks in practice kitchen with rolling walker, retrieving items from fridge with supervision. Educated patient on use of walker basket to transport objects with verbalized understanding. Patient completed object retrieval in [address] to retrieve 10 objects from written grocery list with no errors and assist to transport objects. Patient retrieved half of objects using RUE with minimal cues. Patient appeared safe to complete simple kitchen management tasks with rolling walker. Coordination Daily Assessment   Patient completed fine motor coordination replicating one simple pattern using small pegs with no errors and extra time. Progressing well with RUE coordination. Patient was left seated up in gym for PT. Assessment: Progressing well. Limited by increased fatigue today but always willing to participate. Plan: Continue OT POC with focus on ADL/IADL skills, functional transfers, functional mobility, coordination, strength, static and dynamic balance, and activity tolerance to maximize safety and independence with ADLs and functional transfers.      Robert Chiang, MS, OTR/L  11/17/2017

## 2017-11-18 LAB
GLUCOSE BLD STRIP.AUTO-MCNC: 125 MG/DL (ref 65–100)
GLUCOSE BLD STRIP.AUTO-MCNC: 128 MG/DL (ref 65–100)
GLUCOSE BLD STRIP.AUTO-MCNC: 173 MG/DL (ref 65–100)
GLUCOSE BLD STRIP.AUTO-MCNC: 174 MG/DL (ref 65–100)

## 2017-11-18 PROCEDURE — 97116 GAIT TRAINING THERAPY: CPT

## 2017-11-18 PROCEDURE — 74011250637 HC RX REV CODE- 250/637: Performed by: PHYSICAL MEDICINE & REHABILITATION

## 2017-11-18 PROCEDURE — 97530 THERAPEUTIC ACTIVITIES: CPT

## 2017-11-18 PROCEDURE — 65310000000 HC RM PRIVATE REHAB

## 2017-11-18 PROCEDURE — 74011636637 HC RX REV CODE- 636/637: Performed by: PHYSICAL MEDICINE & REHABILITATION

## 2017-11-18 PROCEDURE — 82962 GLUCOSE BLOOD TEST: CPT

## 2017-11-18 PROCEDURE — 97110 THERAPEUTIC EXERCISES: CPT

## 2017-11-18 RX ADMIN — ASPIRIN 81 MG CHEWABLE TABLET 81 MG: 81 TABLET CHEWABLE at 08:42

## 2017-11-18 RX ADMIN — SUCRALFATE 1 G: 1 TABLET ORAL at 05:55

## 2017-11-18 RX ADMIN — INSULIN LISPRO 2 UNITS: 100 INJECTION, SOLUTION INTRAVENOUS; SUBCUTANEOUS at 21:08

## 2017-11-18 RX ADMIN — INSULIN LISPRO 2 UNITS: 100 INJECTION, SOLUTION INTRAVENOUS; SUBCUTANEOUS at 12:02

## 2017-11-18 RX ADMIN — GLIPIZIDE 20 MG: 5 TABLET ORAL at 08:42

## 2017-11-18 RX ADMIN — SUCRALFATE 1 G: 1 TABLET ORAL at 12:14

## 2017-11-18 RX ADMIN — DOCUSATE SODIUM 100 MG: 100 CAPSULE, LIQUID FILLED ORAL at 08:41

## 2017-11-18 RX ADMIN — AMLODIPINE BESYLATE 5 MG: 5 TABLET ORAL at 08:42

## 2017-11-18 RX ADMIN — FINASTERIDE 5 MG: 5 TABLET, FILM COATED ORAL at 08:42

## 2017-11-18 RX ADMIN — METFORMIN HYDROCHLORIDE 500 MG: 500 TABLET, FILM COATED ORAL at 16:33

## 2017-11-18 RX ADMIN — LORATADINE 10 MG: 10 TABLET ORAL at 18:32

## 2017-11-18 RX ADMIN — SUCRALFATE 1 G: 1 TABLET ORAL at 16:33

## 2017-11-18 RX ADMIN — FLUTICASONE PROPIONATE 2 SPRAY: 50 SPRAY, METERED NASAL at 08:43

## 2017-11-18 RX ADMIN — SUCRALFATE 1 G: 1 TABLET ORAL at 21:06

## 2017-11-18 RX ADMIN — PANTOPRAZOLE SODIUM 40 MG: 40 TABLET, DELAYED RELEASE ORAL at 16:33

## 2017-11-18 RX ADMIN — METFORMIN HYDROCHLORIDE 500 MG: 500 TABLET, FILM COATED ORAL at 12:14

## 2017-11-18 RX ADMIN — ATORVASTATIN CALCIUM 80 MG: 40 TABLET, FILM COATED ORAL at 08:41

## 2017-11-18 RX ADMIN — TEMAZEPAM 30 MG: 15 CAPSULE ORAL at 21:06

## 2017-11-18 RX ADMIN — PANTOPRAZOLE SODIUM 40 MG: 40 TABLET, DELAYED RELEASE ORAL at 05:55

## 2017-11-18 RX ADMIN — TAMSULOSIN HYDROCHLORIDE 0.4 MG: 0.4 CAPSULE ORAL at 08:42

## 2017-11-18 RX ADMIN — ESCITALOPRAM OXALATE 10 MG: 10 TABLET ORAL at 08:41

## 2017-11-18 RX ADMIN — HYDROCHLOROTHIAZIDE: 12.5 CAPSULE ORAL at 08:41

## 2017-11-18 RX ADMIN — METFORMIN HYDROCHLORIDE 500 MG: 500 TABLET, FILM COATED ORAL at 08:42

## 2017-11-18 NOTE — PROGRESS NOTES
7:30 pm: Patient stated that he wanted to try to go to sleep without taking any medication to help him sleep; patient fell asleep for a brief period of time. 9:30 pm: Patient woke up and asked for sleep medication; gave Restoril; patient slept quietly throughout the remainder of the night, other than going to the toilet twice. 6:00 am:  After commenting on how well the patient slept, patient stated, \"I wish I could do better. \"   When complimenting him on how well he's doing with his ADLs, he said, \"I don't know about that\". Hourly rounds were performed throughout the shift. All needs met at this time. Report given to oncoming nurse.

## 2017-11-18 NOTE — PROGRESS NOTES
Problem: Falls - Risk of  Goal: *Absence of Falls  Document Jaiden Fall Risk and appropriate interventions in the flowsheet.    Outcome: Progressing Towards Goal  Fall Risk Interventions:  Mobility Interventions: Patient to call before getting OOB    Mentation Interventions: Adequate sleep, hydration, pain control    Medication Interventions: Teach patient to arise slowly    Elimination Interventions: Call light in reach    History of Falls Interventions: Consult care management for discharge planning

## 2017-11-18 NOTE — PROGRESS NOTES
11/18/17 1120   Time Spent With Patient   Time In 1002   Time Out 1045   Patient Seen For: AM;Other (see progress notes)   Patient received from physical therapy. Restorator bicycle x 5 minutes forward and 5 minutes back. Warm Med. theraputty to retrieve beads to improve FM skills and hand strengthening. Pegboard activity demonstrating good pincer grasp needing extra time to complete. Patient taken back to room. Impulsive getting back in recliner. Did not have brakes locked. Pt tried several times to transfer before therapist could get brakes locked. All essentials within reach. Continue POC.      Nathan Cordial  11/18/2017

## 2017-11-18 NOTE — PROGRESS NOTES
End Of Shift Functional Summary, Nursing      TOILETING:  Does patient need assist with clothing management and/or pericare? No    TOILET TRANSFER:  Pt requires standby assistance/setup. Pt uses walker. BLADDER:  Pt does not have a plunkett catheter that staff manages. Pt does not take medication. Pt is continent. of bladder and voids in toilet   Pt has had 0 bladder accidents during this shift .  (An accident is when the episode is not contained in a brief AND/OR the clothing/linen requires changing/cleaning up.)    BOWEL:  Pt does take medication. Pt is continent of bowel and uses toilet. Pt has had 0 bowel accidents during this shift   (An accident is when the episode is not contained in a brief AND/OR the clothing/linen requires changing/cleaning up.)    BED/CHAIR TRANSFER  Pt requires standby assistance/setup. Patient requires the assistance of 1 staff member(s). Pt uses walker        EATING  Pt requires no assistance. Pt does not wear dentures. TUBE FEEDINGS:  Pt does not  receive nutrition through tube feedings. Patient requires no assistance with feedings. }. Documentation reviewed and plan of care discussed/reviewed with   patient during the shift.

## 2017-11-18 NOTE — PROGRESS NOTES
PHYSICAL THERAPY DAILY NOTE  Time In: 4225  Time Out: 1000  Patient Seen For: AM;Gait training; Therapeutic exercise;Transfer training    Subjective: Pt. Agreeable for PT today. Objective:  Vital Signs:    Visit Vitals    /73 (BP 1 Location: Right arm, BP Patient Position: Sitting)    Pulse 72    Temp 98.4 °F (36.9 °C)    Resp 17    Ht 6' 2\" (1.88 m)    Wt 89.4 kg (197 lb)    SpO2 94%    BMI 25.29 kg/m2       Other (comment) (Fall risk)    BED/MAT MOBILITY Daily Assessment    Rolling Right : 6 (Modified independent)  Rolling Left : 6 (Modified independent)  Supine to Sit : 5 (Supervision)  Sit to Supine : 5 (Supervision)       TRANSFERS Daily Assessment    Transfer Type: SPT with walker  Other: toilet  Transfer Assistance : 4 (Contact guard assistance)  Sit to Stand Assistance: Supervision  Car Transfers: Not tested       GAIT Daily Assessment    Amount of Assistance: 4 (Contact guard assistance)  Distance (ft): 250 Feet (ft)  Assistive Device: Walker, rolling   RW for safety. STEPS or STAIRS Daily Assessment            BALANCE Daily Assessment    Sitting - Static: Good (unsupported)  Sitting - Dynamic: Good (unsupported)  Standing - Static: Good  Standing - Dynamic : Impaired       LOWER EXTREMITY EXERCISES Daily Assessment    Extremity: Both  Exercise Type #1: Seated lower extremity strengthening (Nu-Step x 10:00 minutes.)  Exercise Type #2: Supine lower extremity strengthening (SAQ, SLR, iso add, bridge)  Sets Performed: 2  Reps Performed: 10     Patient return to room at end of treatment and remained up in bedside chair with needs in reach. Assessment: Increased ambulatory distance today. Still needs a RW for balance and safety purposes. Progressing with current POC. Will need to resume stair training next session. Will benefit from car transfer training in future session. Plan of Care:  Consider progression to cane.     Samantha Lenz, PT  11/18/2017

## 2017-11-18 NOTE — PROGRESS NOTES
[facility] PROGRESS NOTE    Josef Hamilton  Admit Date: 11/9/2017  Admit Diagnosis: Left CVA; Stroke (cerebrum) ([address]    Subjective     Patient seen and examined. Vss. Afebrile. Having trouble sleeping, taking restoril. PT well tolerated in am. No new neurologic setbacks. States he is continent of B/B.      Objective:     Current Facility-Administered Medications   Medication Dose Route Frequency    escitalopram oxalate (LEXAPRO) tablet 10 mg  10 mg Oral DAILY    temazepam (RESTORIL) capsule 30 mg  30 mg Oral QHS    diphenhydrAMINE (BENADRYL) capsule 25 mg  25 mg Oral QHS PRN    amLODIPine (NORVASC) tablet 5 mg  5 mg Oral DAILY    alum-mag hydroxide-simeth (MYLANTA) oral suspension 30 mL  30 mL Oral Q4H PRN    magnesium hydroxide (MILK OF MAGNESIA) 400 mg/5 mL oral suspension 30 mL  30 mL Oral DAILY PRN    acetaminophen (TYLENOL) tablet 650 mg  650 mg Oral Q6H PRN    aspirin chewable tablet 81 mg  81 mg Oral DAILY    hydrALAZINE (APRESOLINE) tablet 25 mg  25 mg Oral QID PRN    docusate sodium (COLACE) capsule 100 mg  100 mg Oral DAILY    bisacodyl (DULCOLAX) suppository 10 mg  10 mg Rectal DAILY PRN    pantoprazole (PROTONIX) tablet 40 mg  40 mg Oral ACB&D    atorvastatin (LIPITOR) tablet 80 mg  80 mg Oral DAILY    finasteride (PROSCAR) tablet 5 mg  5 mg Oral DAILY    glipiZIDE (GLUCOTROL) tablet 20 mg  20 mg Oral ACB    benazepril/hydroCHLOROthiazide (LOTENSIN HCT) 10/12.5 mg   Oral DAILY    fluticasone (FLONASE) 50 mcg/actuation nasal spray 2 Spray  2 Spray Both Nostrils DAILY    loratadine (CLARITIN) tablet 10 mg  10 mg Oral QPM    sucralfate (CARAFATE) tablet 1 g  1 g Oral AC&HS    tamsulosin (FLOMAX) capsule 0.4 mg  0.4 mg Oral DAILY    metFORMIN (GLUCOPHAGE) tablet 500 mg  500 mg Oral TID WITH MEALS    insulin lispro (HUMALOG) injection   SubCUTAneous AC&HS    ondansetron (ZOFRAN ODT) tablet 4 mg  4 mg Oral Q6H PRN     Review of Systems:Denies chest pain, shortness of breath, cough, headache, visual problems, abdominal pain, dysurea, calf pain. Pertinent positives are as noted in the medical records and unremarkable otherwise. Visit Vitals    /73 (BP 1 Location: Right arm, BP Patient Position: Sitting)    Pulse 72    Temp 98.4 °F (36.9 °C)    Resp 17    Ht 6' 2\" (1.88 m)    Wt 197 lb (89.4 kg)    SpO2 94%    BMI 25.29 kg/m2        Physical Exam:   General: Alert and age appropriately oriented. No acute cardio respiratory distress. HEENT: Normocephalic,no scleral icterus  Oral mucosa moist without cyanosis   Lungs: Clear to auscultation  bilaterally. Respiration even and unlabored   Heart: Regular rate and rhythm, S1, S2   No  murmurs, clicks, rub or gallops   Abdomen: Soft, non-tender, nondistended. Bowel sounds present. No organomegaly. Genitourinary: Benign . Neuromuscular:      Dysarthric  Rue 4/ 5 ; right pronator drift  RLE sens intact; grossly 4+/5-  Follows commands well. Slightly [place]   Skin/extremity: No rashes, no erythema.  No calf tenderness BLE  No peripheral edema                                                                            Functional Assessment:          Balance  Sitting - Static: Good (unsupported) (11/17/17 1200)  Sitting - Dynamic: Good (unsupported) (11/17/17 1200)  Standing - Static: Good (11/17/17 1200)  Standing - Dynamic : Impaired (11/17/17 1200)           Toileting  Cues:  (SBA for balance and safety) (11/11/17 1231)  Adaptive Equipment: Elevated seat;Grab bars (11/15/17 1008)         Jaiden Fall Risk Assessment:  Case [place] Fall Risk  Mobility: Ambulates or transfers with assist devices or assistance/unsteady gait (11/17/17 1946)  Mobility Interventions: Patient to call before getting OOB (11/17/17 1946)  Mentation: Alert, oriented x 3 (11/17/17 1946)  Mentation Interventions: Adequate sleep, hydration, pain control (11/17/17 1946)  Medication: Patient receiving anticonvulsants, sedatives(tranquilizers), psychotropics or hypnotics, hypoglycemics, narcotics, sleep aids, antihypertensives, laxatives, or diuretics (11/17/17 1946)  Medication Interventions: Teach patient to arise slowly (11/17/17 1946)  Elimination: Needs assistance with toileting (11/17/17 1946)  Elimination Interventions: Call light in reach (11/17/17 1946)  Prior Fall History: No (11/17/17 1946)  History of Falls Interventions: Consult care management for discharge planning (11/17/17 1946)  Total Score: 3 (11/17/17 1946)  Standard Fall Precautions: Yes (11/09/17 1132)  High Fall Risk: Yes (11/17/17 1946)     Speech Assessment:  Aspiration Signs/Symptoms: Delayed cough/throat clear (11/10/17 0910)      Ambulation:  Gait  Distance (ft): 150 Feet (ft) (150 x 3) (11/17/17 1200)  Assistive Device: Walker, rolling (11/17/17 1000)  Rail Use: Both (11/17/17 1200)     Labs/Studies:  Recent Results (from the past 72 hour(s))   GLUCOSE, POC    Collection Time: 11/15/17 10:52 AM   Result Value Ref Range    Glucose (POC) 104 (H) 65 - 100 mg/dL   GLUCOSE, POC    Collection Time: 11/15/17  4:08 PM   Result Value Ref Range    Glucose (POC) 77 65 - 100 mg/dL   GLUCOSE, POC    Collection Time: 11/15/17  9:10 PM   Result Value Ref Range    Glucose (POC) 171 (H) 65 - 100 mg/dL   GLUCOSE, POC    Collection Time: 11/16/17  7:07 AM   Result Value Ref Range    Glucose (POC) 110 (H) 65 - 100 mg/dL   GLUCOSE, POC    Collection Time: 11/16/17 11:25 AM   Result Value Ref Range    Glucose (POC) 116 (H) 65 - 100 mg/dL   GLUCOSE, POC    Collection Time: 11/16/17  4:26 PM   Result Value Ref Range    Glucose (POC) 95 65 - 100 mg/dL   GLUCOSE, POC    Collection Time: 11/16/17  8:32 PM   Result Value Ref Range    Glucose (POC) 124 (H) 65 - 100 mg/dL   GLUCOSE, POC    Collection Time: 11/17/17  7:10 AM   Result Value Ref Range    Glucose (POC) 114 (H) 65 - 100 mg/dL   GLUCOSE, POC    Collection Time: 11/17/17 11:22 AM   Result Value Ref Range    Glucose (POC) 152 (H) 65 - 100 mg/dL   GLUCOSE, POC Collection Time: 11/17/17  4:11 PM   Result Value Ref Range    Glucose (POC) 86 65 - 100 mg/dL   GLUCOSE, POC    Collection Time: 11/17/17  9:08 PM   Result Value Ref Range    Glucose (POC) 221 (H) 65 - 100 mg/dL   GLUCOSE, POC    Collection Time: 11/18/17  7:10 AM   Result Value Ref Range    Glucose (POC) 128 (H) 65 - 100 mg/dL       Assessment:     Problem List as of 11/18/2017  Date Reviewed: 10/23/2017          Codes Class Noted - Resolved    Stroke (cerebrum) ([facility] ICD-10-CM: I63.9  ICD-9-CM: 434.91  11/9/2017 - Present        Abdominal bloating ICD-10-CM: R14.0  ICD-9-CM: 787.3  10/23/2017 - Present        Nausea ICD-10-CM: R11.0  ICD-9-CM: 787.02  8/15/2017 - Present        Fatty liver ICD-10-CM: K76.0  ICD-9-CM: 571.8  6/28/2017 - Present        Lower abdominal pain ICD-10-CM: R10.30  ICD-9-CM: 789.09  6/19/2017 - Present        Neck pain ICD-10-CM: M54.2  ICD-9-CM: 723.1  12/27/2016 - Present        GERD (gastroesophageal reflux disease) ICD-10-CM: K21.9  ICD-9-CM: 530.81  6/23/2016 - Present        Type 2 diabetes mellitus ([facility] ICD-10-CM: E11.9  ICD-9-CM: 250.00  2/25/2015 - Present        Allergic rhinitis ICD-10-CM: J30.9  ICD-9-CM: 477.9  2/25/2015 - Present        HLD (hyperlipidemia) ICD-10-CM: E78.5  ICD-9-CM: 272.4  2/25/2015 - Present        Enlarged prostate with lower urinary tract symptoms (LUTS) ICD-10-CM: N40.1  ICD-9-CM: 600.01  2/25/2015 - Present        Essential hypertension ICD-10-CM: I10  ICD-9-CM: 401.9  2/25/2015 - Present        Asthma ICD-10-CM: J45.909  ICD-9-CM: 493.90  2/25/2015 - Present        RESOLVED: Delusional disorder ([facility] ICD-10-CM: F22  ICD-9-CM: 297.1  2/25/2015 - 6/28/2017        RESOLVED: Backache, unspecified ICD-10-CM: M54.9  ICD-9-CM: 724.5  2/25/2015 - 10/23/2017        RESOLVED: Urinary tract infection, site not specified ICD-10-CM: N39.0  ICD-9-CM: 599.0  2/25/2015 - 10/23/2017            [place] Infarct, ischemic          Continue daily physician medical management:  Pneumonia prophylaxis- Incentive spirometer every hour while awake      Ischemic CVA; cont asa and lipitor; permissive HTN  - continue antiplatelet therapy; aspirin 81  - continue statin; lipitor      DVT risk / DVT Prophylaxis-  daily physician exam to assess for signs and symptoms as patient is at increased risk for of thromboembolism. Mobilization as tolerated. Intermittent pneumatic compression devices when in bed Thigh-high or knee-high thromboembolic deterrent hose when out of bed.   - continue  subcut heparin       Pain Control: no current joint or muscle symptoms, essentially pain-free. Will require regular pain assessment and comprenhensive pain management,       Hypertension - BP uncontrolled, fluctuating, managed medically. Continue Lotensin and add prn hydralazine. His SBP upon arrival was >220 and DBP >110   monitor closely, consider adding metoprolol; want permissive htn for cerebral perfusion but goal 120-140s  -11/10 174/86 add norvasc  - 11/11 SBP- 143 this am, continues to improve, on norvasc and lotensin  -11/16 146/74; permissive htn ok  - 11/18 -150's. No new antihypertensive changes. Monitor.       Diabetes mellitus - Uncontrolled. poor glycemic control. Will require daily, close FSG monitoring and medication adjustment to optimize glycemic control in setting of acute illness and hospitalization. Cont metformin and glucotrol 20mg. Add SSI. Cont carb restricted diet, accuchecks qac and qhs  -11/10 BS fine11/18 - conitune   - BG - 121 this am, yesterday 121- 165 range  -11/14 consider changing to bid bs  -11/15 bs of 44 yesterday; 117 this am.; consider dropping glucotrol dose  -11/16 bs 110 this a.m ; no hypoglycemia past 24 hrs  -11/17 -171 acceptable  - 11/18 -  fair control. No new adjustments.       Urinary retention/ neurogenic bladder - schedule voids q6-8 hrs. Check post-void residual as needed; In and Out catheterize if post-void residual is more than 400 cc.  Pt with hx of prostate issues . Cont flomax and proscar  11/17 no issues      bowel program - add colace and prn bowel program      GERD - resume PPI. At times may need additional antacids, Maalox prn. On omeprazole which is nonformulary, thus change to protonix 40mg bid.  -H pylori; cont Biaxin and amoxil and carafate. Will treat with dual abx for total of 2 weeks. Will f/u with GI post dc  - no gi complaints  -11/17 abx dc'd yesterday; cont GI prophlaxis      Asthma/allergic rhinitis; cont flonase and zyrtec.       Sleep mgmt-  will continue to increase prn trazodone to 100mg  -11/13 had difficult noc with restlessness, confusion; dc trazadone and try benadryl and restoril; monitor for confusion; could consider remeron  -11/14 a bit better noc; 11/15 cont current plan  -11/17 confirmed with staff that he IS sleeping and that he perseverates on thinking he did not. RN to continue to document (could consider a sleep log)  11/18 - continue current management. restoril prn.      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      Signed By: Jasper Kruse MD     November 18, 2017

## 2017-11-18 NOTE — PROGRESS NOTES
Pt OOB to recliner, pt stated he slept ok last night. He is A/O x 4, denies pain, respiration even and non labored. Pt with right side weakness. Pt uses walker and SBA when ambulating to bathroom. Pt feeds himself and sometimes need help with opening juices or milk cartons. Pt takes medication whole and is on a diabatic diet. Pt encourage to call for any needs.

## 2017-11-19 LAB
GLUCOSE BLD STRIP.AUTO-MCNC: 104 MG/DL (ref 65–100)
GLUCOSE BLD STRIP.AUTO-MCNC: 107 MG/DL (ref 65–100)
GLUCOSE BLD STRIP.AUTO-MCNC: 156 MG/DL (ref 65–100)
GLUCOSE BLD STRIP.AUTO-MCNC: 216 MG/DL (ref 65–100)

## 2017-11-19 PROCEDURE — 82962 GLUCOSE BLOOD TEST: CPT

## 2017-11-19 PROCEDURE — 74011250637 HC RX REV CODE- 250/637: Performed by: PHYSICAL MEDICINE & REHABILITATION

## 2017-11-19 PROCEDURE — 51798 US URINE CAPACITY MEASURE: CPT

## 2017-11-19 PROCEDURE — 74011636637 HC RX REV CODE- 636/637: Performed by: PHYSICAL MEDICINE & REHABILITATION

## 2017-11-19 PROCEDURE — 65310000000 HC RM PRIVATE REHAB

## 2017-11-19 RX ADMIN — DOCUSATE SODIUM 100 MG: 100 CAPSULE, LIQUID FILLED ORAL at 08:30

## 2017-11-19 RX ADMIN — SUCRALFATE 1 G: 1 TABLET ORAL at 11:28

## 2017-11-19 RX ADMIN — METFORMIN HYDROCHLORIDE 500 MG: 500 TABLET, FILM COATED ORAL at 16:39

## 2017-11-19 RX ADMIN — GLIPIZIDE 20 MG: 5 TABLET ORAL at 08:34

## 2017-11-19 RX ADMIN — INSULIN LISPRO 4 UNITS: 100 INJECTION, SOLUTION INTRAVENOUS; SUBCUTANEOUS at 11:30

## 2017-11-19 RX ADMIN — SUCRALFATE 1 G: 1 TABLET ORAL at 16:38

## 2017-11-19 RX ADMIN — HYDROCHLOROTHIAZIDE: 12.5 CAPSULE ORAL at 08:32

## 2017-11-19 RX ADMIN — ESCITALOPRAM OXALATE 10 MG: 10 TABLET ORAL at 08:33

## 2017-11-19 RX ADMIN — AMLODIPINE BESYLATE 5 MG: 5 TABLET ORAL at 08:31

## 2017-11-19 RX ADMIN — INSULIN LISPRO 2 UNITS: 100 INJECTION, SOLUTION INTRAVENOUS; SUBCUTANEOUS at 21:55

## 2017-11-19 RX ADMIN — LORATADINE 10 MG: 10 TABLET ORAL at 17:30

## 2017-11-19 RX ADMIN — PANTOPRAZOLE SODIUM 40 MG: 40 TABLET, DELAYED RELEASE ORAL at 05:27

## 2017-11-19 RX ADMIN — TEMAZEPAM 30 MG: 15 CAPSULE ORAL at 21:51

## 2017-11-19 RX ADMIN — PANTOPRAZOLE SODIUM 40 MG: 40 TABLET, DELAYED RELEASE ORAL at 16:38

## 2017-11-19 RX ADMIN — SUCRALFATE 1 G: 1 TABLET ORAL at 05:27

## 2017-11-19 RX ADMIN — ASPIRIN 81 MG CHEWABLE TABLET 81 MG: 81 TABLET CHEWABLE at 08:32

## 2017-11-19 RX ADMIN — FINASTERIDE 5 MG: 5 TABLET, FILM COATED ORAL at 08:33

## 2017-11-19 RX ADMIN — METFORMIN HYDROCHLORIDE 500 MG: 500 TABLET, FILM COATED ORAL at 08:33

## 2017-11-19 RX ADMIN — SUCRALFATE 1 G: 1 TABLET ORAL at 21:50

## 2017-11-19 RX ADMIN — METFORMIN HYDROCHLORIDE 500 MG: 500 TABLET, FILM COATED ORAL at 11:28

## 2017-11-19 RX ADMIN — FLUTICASONE PROPIONATE 2 SPRAY: 50 SPRAY, METERED NASAL at 08:36

## 2017-11-19 RX ADMIN — ATORVASTATIN CALCIUM 80 MG: 40 TABLET, FILM COATED ORAL at 08:34

## 2017-11-19 RX ADMIN — TAMSULOSIN HYDROCHLORIDE 0.4 MG: 0.4 CAPSULE ORAL at 08:30

## 2017-11-19 NOTE — PROGRESS NOTES
Problem: Falls - Risk of  Goal: *Absence of Falls  Document Jaiden Fall Risk and appropriate interventions in the flowsheet.    Outcome: Progressing Towards Goal  Fall Risk Interventions:  Mobility Interventions: Patient to call before getting OOB, Utilize walker, cane, or other assitive device    Mentation Interventions: Adequate sleep, hydration, pain control, Door open when patient unattended, Increase mobility, Toileting rounds    Medication Interventions: Patient to call before getting OOB, Teach patient to arise slowly    Elimination Interventions: Call light in reach, Patient to call for help with toileting needs, Toilet paper/wipes in reach, Toileting schedule/hourly rounds, Urinal in reach    History of Falls Interventions: Door open when patient unattended

## 2017-11-19 NOTE — PROGRESS NOTES
End Of Shift Functional Summary, Nursing      TOILETING:  Does patient need assist with clothing management and/or pericare? No    TOILET TRANSFER:  Pt requires minimal assistance. Pt uses walker. BLADDER:  Pt does not have a plunkett catheter that staff manages. Pt does take medication. Pt is continent. of bladder and voids in toilet. Pt has had 0 bladder accidents during this shift. BOWEL:  Pt does take medication. Pt is continent of bowel and uses toilet. Pt has had 0 bowel accidents during this shift. BED/CHAIR TRANSFER  Pt requires standby assistance/setup. Patient requires the assistance of 1 staff member(s). Pt uses walker      EATING  Pt requires no assistance. Pt does not wear dentures. TUBE FEEDINGS:  Pt does not  receive nutrition through tube feedings. Documentation reviewed and plan of care discussed/reviewed with   patient and family/spouse during the shift.

## 2017-11-19 NOTE — PROGRESS NOTES
Problem: Falls - Risk of  Goal: *Absence of Falls  Document Jaiden Fall Risk and appropriate interventions in the flowsheet.    Outcome: Progressing Towards Goal  Fall Risk Interventions:  Mobility Interventions: Patient to call before getting OOB, Utilize walker, cane, or other assitive device    Mentation Interventions: Adequate sleep, hydration, pain control, Door open when patient unattended    Medication Interventions: Evaluate medications/consider consulting pharmacy, Patient to call before getting OOB    Elimination Interventions: Call light in reach, Patient to call for help with toileting needs    History of Falls Interventions: Door open when patient unattended

## 2017-11-19 NOTE — PROGRESS NOTES
SFD PROGRESS NOTE    Gael Klein  Admit Date: 11/9/2017  Admit Diagnosis: Left CVA; Stroke (cerebrum) ([facility] [address] [facility]    Subjective     Patient seen and examined. Vss. Afebrile. No new neurologic changes. Continent of B/B. Ambulating at [facility] level. Still needs a RW for balance and safety purposes.      Objective:     Current Facility-Administered Medications   Medication Dose Route Frequency    escitalopram oxalate (LEXAPRO) tablet 10 mg  10 mg Oral DAILY    temazepam (RESTORIL) capsule 30 mg  30 mg Oral QHS    diphenhydrAMINE (BENADRYL) capsule 25 mg  25 mg Oral QHS PRN    amLODIPine (NORVASC) tablet 5 mg  5 mg Oral DAILY    alum-mag hydroxide-simeth (MYLANTA) oral suspension 30 mL  30 mL Oral Q4H PRN    magnesium hydroxide (MILK OF MAGNESIA) 400 mg/5 mL oral suspension 30 mL  30 mL Oral DAILY PRN    acetaminophen (TYLENOL) tablet 650 mg  650 mg Oral Q6H PRN    aspirin chewable tablet 81 mg  81 mg Oral DAILY    hydrALAZINE (APRESOLINE) tablet 25 mg  25 mg Oral QID PRN    docusate sodium (COLACE) capsule 100 mg  100 mg Oral DAILY    bisacodyl (DULCOLAX) suppository 10 mg  10 mg Rectal DAILY PRN    pantoprazole (PROTONIX) tablet 40 mg  40 mg Oral ACB&D    atorvastatin (LIPITOR) tablet 80 mg  80 mg Oral DAILY    finasteride (PROSCAR) tablet 5 mg  5 mg Oral DAILY    glipiZIDE (GLUCOTROL) tablet 20 mg  20 mg Oral ACB    benazepril/hydroCHLOROthiazide (LOTENSIN HCT) 10/12.5 mg   Oral DAILY    fluticasone (FLONASE) 50 mcg/actuation nasal spray 2 Spray  2 Spray Both Nostrils DAILY    loratadine (CLARITIN) tablet 10 mg  10 mg Oral QPM    sucralfate (CARAFATE) tablet 1 g  1 g Oral AC&HS    tamsulosin (FLOMAX) capsule 0.4 mg  0.4 mg Oral DAILY    metFORMIN (GLUCOPHAGE) tablet 500 mg  500 mg Oral TID WITH MEALS    insulin lispro (HUMALOG) injection   SubCUTAneous AC&HS    ondansetron (ZOFRAN ODT) tablet 4 mg  4 mg Oral Q6H PRN     Review of Systems:Denies chest pain, shortness of breath, cough, headache, visual problems, abdominal pain, dysurea, calf pain. Pertinent positives are as noted in the medical records and unremarkable otherwise. Visit Vitals    /75 (BP 1 Location: Right arm, BP Patient Position: Sitting)    Pulse 73    Temp 98 °F (36.7 °C)    Resp 19    Ht 6' 2\" (1.88 m)    Wt 197 lb (89.4 kg)    SpO2 94%    BMI 25.29 kg/m2        Physical Exam:   General: Alert and age appropriately oriented. No acute cardio respiratory distress. HEENT: Normocephalic,no scleral icterus  Oral mucosa moist without cyanosis   Lungs: Clear to auscultation  bilaterally. Respiration even and unlabored   Heart: Regular rate and rhythm, S1, S2   No  murmurs, clicks, rub or gallops   Abdomen: Soft, non-tender, nondistended. Bowel sounds present. No organomegaly. Genitourinary: Benign . Neuromuscular:      Dysarthric  Rue 4/ 5 ; right pronator drift  RLE sens intact; grossly 4+/5-  Follows commands well. Slightly [place]   Skin/extremity: No rashes, no erythema.  No calf tenderness BLE  No peripheral edema                                                                            Functional Assessment:          Balance  Sitting - Static: Good (unsupported) (11/18/17 0925)  Sitting - Dynamic: Good (unsupported) (11/18/17 0925)  Standing - Static: Good (11/18/17 0925)  Standing - Dynamic : Impaired (11/18/17 0925)           Toileting  Cues:  (SBA for balance and safety) (11/11/17 1231)  Adaptive Equipment: Elevated seat;Grab bars (11/15/17 1008)         [facility] Fall Risk Assessment:  [facility]  Mobility: Ambulates or transfers with assist devices or assistance/unsteady gait (11/19/17 0715)  Mobility Interventions: Patient to call before getting OOB;Utilize walker, cane, or other assitive device (11/19/17 0715)  Mentation: Alert, oriented x 3 (11/19/17 0715)  Mentation Interventions: Adequate sleep, hydration, pain control;Door open when patient unattended (11/19/17 0715)  Medication: Patient receiving anticonvulsants, sedatives(tranquilizers), psychotropics or hypnotics, hypoglycemics, narcotics, sleep aids, antihypertensives, laxatives, or diuretics (11/19/17 0715)  Medication Interventions: Evaluate medications/consider consulting pharmacy; Patient to call before getting OOB (11/19/17 0715)  Elimination: Needs assistance with toileting (11/19/17 0715)  Elimination Interventions: Call light in reach; Patient to call for help with toileting needs (11/19/17 0715)  Prior Fall History: No (11/19/17 0715)  History of Falls Interventions: Door open when patient unattended (11/18/17 1941)  Total Score: 3 (11/19/17 0715)  Standard Fall Precautions: Yes (11/09/17 1132)  High Fall Risk: Yes (11/19/17 0715)     Speech Assessment:  Aspiration Signs/Symptoms: Delayed cough/throat clear (11/10/17 0910)      Ambulation:  Gait  Distance (ft): 250 Feet (ft) (11/18/17 0925)  Assistive Device: Amanda Hylan, rolling (11/18/17 0925)  Rail Use: Both (11/17/17 1200)     Labs/Studies:  Recent Results (from the past 72 hour(s))   GLUCOSE, POC    Collection Time: 11/16/17  4:26 PM   Result Value Ref Range    Glucose (POC) 95 65 - 100 mg/dL   GLUCOSE, POC    Collection Time: 11/16/17  8:32 PM   Result Value Ref Range    Glucose (POC) 124 (H) 65 - 100 mg/dL   GLUCOSE, POC    Collection Time: 11/17/17  7:10 AM   Result Value Ref Range    Glucose (POC) 114 (H) 65 - 100 mg/dL   GLUCOSE, POC    Collection Time: 11/17/17 11:22 AM   Result Value Ref Range    Glucose (POC) 152 (H) 65 - 100 mg/dL   GLUCOSE, POC    Collection Time: 11/17/17  4:11 PM   Result Value Ref Range    Glucose (POC) 86 65 - 100 mg/dL   GLUCOSE, POC    Collection Time: 11/17/17  9:08 PM   Result Value Ref Range    Glucose (POC) 221 (H) 65 - 100 mg/dL   GLUCOSE, POC    Collection Time: 11/18/17  7:10 AM   Result Value Ref Range    Glucose (POC) 128 (H) 65 - 100 mg/dL   GLUCOSE, POC    Collection Time: 11/18/17 11:16 AM   Result Value Ref Range    Glucose (POC) 173 (H) 65 - 100 mg/dL   GLUCOSE, POC    Collection Time: 11/18/17  3:57 PM   Result Value Ref Range    Glucose (POC) 125 (H) 65 - 100 mg/dL   GLUCOSE, POC    Collection Time: 11/18/17  8:51 PM   Result Value Ref Range    Glucose (POC) 174 (H) 65 - 100 mg/dL   GLUCOSE, POC    Collection Time: 11/19/17  7:14 AM   Result Value Ref Range    Glucose (POC) 107 (H) 65 - 100 mg/dL   GLUCOSE, POC    Collection Time: 11/19/17 11:12 AM   Result Value Ref Range    Glucose (POC) 216 (H) 65 - 100 mg/dL       Assessment:     Problem List as of 11/19/2017  Date Reviewed: 10/23/2017          Codes Class Noted - Resolved    Stroke (cerebrum) ([facility] ICD-10-CM: I63.9  ICD-9-CM: 434.91  11/9/2017 - Present        Abdominal bloating ICD-10-CM: R14.0  ICD-9-CM: 787.3  10/23/2017 - Present        Nausea ICD-10-CM: R11.0  ICD-9-CM: 787.02  8/15/2017 - Present        Fatty liver ICD-10-CM: K76.0  ICD-9-CM: 571.8  6/28/2017 - Present        Lower abdominal pain ICD-10-CM: R10.30  ICD-9-CM: 789.09  6/19/2017 - Present        Neck pain ICD-10-CM: M54.2  ICD-9-CM: 723.1  12/27/2016 - Present        GERD (gastroesophageal reflux disease) ICD-10-CM: K21.9  ICD-9-CM: 530.81  6/23/2016 - Present        Type 2 diabetes mellitus ([facility] ICD-10-CM: E11.9  ICD-9-CM: 250.00  2/25/2015 - Present        Allergic rhinitis ICD-10-CM: J30.9  ICD-9-CM: 477.9  2/25/2015 - Present        HLD (hyperlipidemia) ICD-10-CM: E78.5  ICD-9-CM: 272.4  2/25/2015 - Present        Enlarged prostate with lower urinary tract symptoms (LUTS) ICD-10-CM: N40.1  ICD-9-CM: 600.01  2/25/2015 - Present        Essential hypertension ICD-10-CM: I10  ICD-9-CM: 401.9  2/25/2015 - Present        Asthma ICD-10-CM: J45.909  ICD-9-CM: 493.90  2/25/2015 - Present        RESOLVED: Delus[facility] ICD-10-CM: F22  ICD-9-CM: 297.1  2/25/2015 - 6/28/2017        RESOLVED: Backache, unspecified ICD-10-CM: M54.9  ICD-9-CM: 724.5  2/25/2015 - 10/23/2017        RESOLVED: Urinary tract infection, site not specified ICD-10-CM: N39.0  ICD-9-CM: 599.0  2/25/2015 - 10/23/2017            Pontine Infarct, ischemic          Continue daily physician medical management:  Pneumonia prophylaxis- Incentive spirometer every hour while awake      Ischemic CVA/ pontine infarct. - continue antiplatelet therapy; aspirin 81  - continue statin; lipitor  - 11/19 - no new deficits.       DVT risk / DVT Prophylaxis-  daily physician exam to assess for signs and symptoms as patient is at increased risk for of thromboembolism. Mobilization as tolerated. Intermittent pneumatic compression devices when in bed Thigh-high or knee-high thromboembolic deterrent hose when out of bed.   - continue  subcut heparin       Pain Control: no current joint or muscle symptoms, essentially pain-free. Will require regular pain assessment and comprenhensive pain management,       Hypertension - BP uncontrolled, fluctuating, managed medically. Continue Lotensin and add prn hydralazine. His SBP upon arrival was >220 and DBP >110   monitor closely, consider adding metoprolol; want permissive htn for cerebral perfusion but goal 120-140s  -11/10 174/86 add norvasc, lotensin  - 11/11 SBP- 143 this am, continues to improve, on norvasc and lotensin  -11/16 146/74; permissive htn ok  - 11/18 -150's. No new antihypertensive changes. Monitor.   - 11/19 - continue current dose Norvasc, lotensin. Diabetes mellitus - Uncontrolled. poor glycemic control. Will require daily, close FSG monitoring and medication adjustment to optimize glycemic control in setting of acute illness and hospitalization. Cont metformin and glucotrol 20mg. Add SSI.  Cont carb restricted diet, accuchecks qac and qhs  -11/10 BS fine11/18 - conitune   - BG - 121 this am, yesterday 121- 165 range  -11/14 consider changing to bid bs  -11/15 bs of 44 yesterday; 117 this am.; consider dropping glucotrol dose  -11/16 bs 110 this a.m ; no hypoglycemia past 24 hrs  -11/17 -171 acceptable  - 11/19 -  fair control. No new adjustments. Continue roal hypoglycemics; Glucophage 500 tid, glucotrol 20.      Urinary retention/ neurogenic bladder - schedule voids q6-8 hrs. Check post-void residual as needed; In and Out catheterize if post-void residual is more than 400 cc. Pt with hx of prostate issues . Cont flomax and proscar  11/17 no issues      bowel program - add colace and prn bowel program      GERD - resume PPI. At times may need additional antacids, Maalox prn. On omeprazole which is nonformulary, thus change to protonix 40mg bid.  -H pylori; cont Biaxin and amoxil and carafate. Will treat with dual abx for total of 2 weeks. Will f/u with GI post dc  - no gi complaints  -11/17 abx dc'd yesterday; cont GI prophlaxis      Asthma/allergic rhinitis; cont flonase and zyrtec.       Sleep mgmt-  will continue to increase prn trazodone to 100mg  -11/13 had difficult noc with restlessness, confusion; dc trazadone and try benadryl and restoril; monitor for confusion; could consider remeron  -11/14 a bit better noc; 11/15 cont current plan  -11/17 confirmed with staff that he IS sleeping and that he perseverates on thinking he did not. RN to continue to document (could consider a sleep log)  11/18 - continue current management. restoril prn.      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      Signed By: Jessica Vaughn MD     November 19, 2017

## 2017-11-20 LAB
GLUCOSE BLD STRIP.AUTO-MCNC: 113 MG/DL (ref 65–100)
GLUCOSE BLD STRIP.AUTO-MCNC: 114 MG/DL (ref 65–100)
GLUCOSE BLD STRIP.AUTO-MCNC: 173 MG/DL (ref 65–100)
GLUCOSE BLD STRIP.AUTO-MCNC: 192 MG/DL (ref 65–100)

## 2017-11-20 PROCEDURE — 74011636637 HC RX REV CODE- 636/637: Performed by: PHYSICAL MEDICINE & REHABILITATION

## 2017-11-20 PROCEDURE — 97535 SELF CARE MNGMENT TRAINING: CPT

## 2017-11-20 PROCEDURE — 74011250637 HC RX REV CODE- 250/637: Performed by: PHYSICAL MEDICINE & REHABILITATION

## 2017-11-20 PROCEDURE — 97530 THERAPEUTIC ACTIVITIES: CPT

## 2017-11-20 PROCEDURE — 82962 GLUCOSE BLOOD TEST: CPT

## 2017-11-20 PROCEDURE — 99232 SBSQ HOSP IP/OBS MODERATE 35: CPT | Performed by: PHYSICAL MEDICINE & REHABILITATION

## 2017-11-20 PROCEDURE — 92526 ORAL FUNCTION THERAPY: CPT

## 2017-11-20 PROCEDURE — 97116 GAIT TRAINING THERAPY: CPT

## 2017-11-20 PROCEDURE — 92507 TX SP LANG VOICE COMM INDIV: CPT

## 2017-11-20 PROCEDURE — 65310000000 HC RM PRIVATE REHAB

## 2017-11-20 PROCEDURE — 97110 THERAPEUTIC EXERCISES: CPT

## 2017-11-20 RX ORDER — DIPHENHYDRAMINE HCL 25 MG
25 CAPSULE ORAL
Status: DISCONTINUED | OUTPATIENT
Start: 2017-11-20 | End: 2017-11-21

## 2017-11-20 RX ADMIN — INSULIN LISPRO 2 UNITS: 100 INJECTION, SOLUTION INTRAVENOUS; SUBCUTANEOUS at 08:29

## 2017-11-20 RX ADMIN — HYDROCHLOROTHIAZIDE: 12.5 CAPSULE ORAL at 08:28

## 2017-11-20 RX ADMIN — SUCRALFATE 1 G: 1 TABLET ORAL at 11:17

## 2017-11-20 RX ADMIN — PANTOPRAZOLE SODIUM 40 MG: 40 TABLET, DELAYED RELEASE ORAL at 15:57

## 2017-11-20 RX ADMIN — METFORMIN HYDROCHLORIDE 500 MG: 500 TABLET, FILM COATED ORAL at 13:17

## 2017-11-20 RX ADMIN — LORATADINE 10 MG: 10 TABLET ORAL at 17:36

## 2017-11-20 RX ADMIN — ESCITALOPRAM OXALATE 10 MG: 10 TABLET ORAL at 08:29

## 2017-11-20 RX ADMIN — DIPHENHYDRAMINE HYDROCHLORIDE 25 MG: 25 CAPSULE ORAL at 20:45

## 2017-11-20 RX ADMIN — SUCRALFATE 1 G: 1 TABLET ORAL at 15:58

## 2017-11-20 RX ADMIN — FINASTERIDE 5 MG: 5 TABLET, FILM COATED ORAL at 08:28

## 2017-11-20 RX ADMIN — DOCUSATE SODIUM 100 MG: 100 CAPSULE, LIQUID FILLED ORAL at 08:28

## 2017-11-20 RX ADMIN — ASPIRIN 81 MG CHEWABLE TABLET 81 MG: 81 TABLET CHEWABLE at 08:28

## 2017-11-20 RX ADMIN — SUCRALFATE 1 G: 1 TABLET ORAL at 20:45

## 2017-11-20 RX ADMIN — METFORMIN HYDROCHLORIDE 500 MG: 500 TABLET, FILM COATED ORAL at 16:22

## 2017-11-20 RX ADMIN — TAMSULOSIN HYDROCHLORIDE 0.4 MG: 0.4 CAPSULE ORAL at 08:28

## 2017-11-20 RX ADMIN — SUCRALFATE 1 G: 1 TABLET ORAL at 05:42

## 2017-11-20 RX ADMIN — FLUTICASONE PROPIONATE 2 SPRAY: 50 SPRAY, METERED NASAL at 08:32

## 2017-11-20 RX ADMIN — GLIPIZIDE 20 MG: 5 TABLET ORAL at 08:27

## 2017-11-20 RX ADMIN — ATORVASTATIN CALCIUM 80 MG: 40 TABLET, FILM COATED ORAL at 08:28

## 2017-11-20 RX ADMIN — METFORMIN HYDROCHLORIDE 500 MG: 500 TABLET, FILM COATED ORAL at 08:29

## 2017-11-20 RX ADMIN — INSULIN LISPRO 2 UNITS: 100 INJECTION, SOLUTION INTRAVENOUS; SUBCUTANEOUS at 11:36

## 2017-11-20 RX ADMIN — TEMAZEPAM 30 MG: 15 CAPSULE ORAL at 20:45

## 2017-11-20 RX ADMIN — PANTOPRAZOLE SODIUM 40 MG: 40 TABLET, DELAYED RELEASE ORAL at 05:42

## 2017-11-20 RX ADMIN — AMLODIPINE BESYLATE 5 MG: 5 TABLET ORAL at 08:29

## 2017-11-20 NOTE — PROGRESS NOTES
Hourly rounding completed throughout shift. End Of Shift Functional Summary, Nursing      TOILETING:  Does patient need assist with clothing management and/or pericare? No    TOILET TRANSFER:  Pt requires minimal assistance. Pt uses walker. BLADDER:  Pt does not have a plunkett catheter that staff manages. Pt does not take medication. Pt is continent. of bladder and voids in toilet  Pt requires staff to empty device Pt has had 0 bladder accidents during this shift     BED/CHAIR TRANSFER  Pt requires minimal assistance. Patient requires the assistance of x1 staff member(s). Pt uses walker    Documentation reviewed and plan of care discussed/reviewed with   patient during the shift.

## 2017-11-20 NOTE — PROGRESS NOTES
PHYSICAL THERAPY DAILY NOTE  Time In: 0830  Time Out: 3975  Patient Seen For: AM;Gait training;Patient education; Therapeutic exercise;Transfer training    Subjective: \"I didn't sleep well last night. \" Patient agreeable to therapy. Objective: Vital Signs:   Patient Vitals for the past 8 hrs:   Temp Pulse Resp BP SpO2   11/20/17 0730 99.8 °F (37.7 °C) 80 16 165/82 96 %         Pain level: No pain reported. Pain location: n/a  Pain interventions: n/a    Patient education: Educated patient on safety with RW for transfers and household ambulation. Interdisciplinary Communication: Communicated with Spring More regarding patient's POC. Other (comment) (Fall risk)  GROSS ASSESSMENT Daily Assessment            BED/MAT MOBILITY Daily Assessment            TRANSFERS Daily Assessment   Required for safety. Transfer Type: SPT with walker  Transfer Assistance : 5 (Stand-by assistance)  Sit to Stand Assistance: Stand-by assistance  Car Transfers: Not tested       GAIT Daily Assessment   Patient ambulated with decreased motor control of R LE with foot flat present at IC of R LE.  Amount of Assistance: 5 (Stand-by assistance)  Distance (ft): 250 Feet (ft) (x1, 175' x1)  Assistive Device: Walker, rolling       STEPS or STAIRS Daily Assessment            BALANCE Daily Assessment    Sitting - Static: Good (unsupported)  Sitting - Dynamic: Good (unsupported)  Standing - Static: Fair  Standing - Dynamic : Impaired       WHEELCHAIR MOBILITY Daily Assessment            LOWER EXTREMITY EXERCISES Daily Assessment    Extremity: Both  Exercise Type #1: Supine lower extremity strengthening  Sets Performed: 2  Reps Performed: 20  Level of Assist: Supervision     Patient performed the following B LE exercises:  SUPINE EXERCISES Sets Reps Comments   Ankle Pumps 2 20    Clamshells 2 20 Sidelying   Bridging 2 20    Heel Slides 2 20    Hip Abduction 2 20    Short Arc Quad 2 20    Straight Leg Raise 2 20      Patient returned to room sitting in recliner with all needs in reach. Assessment: Patient demonstrated decreased motor control of R LE with therapeutic exercise and gait training. Patient will benefit from further therapy to increase independence with transfers and household ambulation. Plan of Care: Continue with POC and progress as tolerated.        Rosemarie Hernandes  11/20/2017

## 2017-11-20 NOTE — PROGRESS NOTES
Subjective \"I am ok. I just haven't been sleeping and that hasn't been good for me. \"   Activity UE exercises with 3 lb weighted bar   Strength/Endurance Patient was tired from being a busy day and not \"getting enough sleep. \"  He participated and took appropriate rest breaks as needed. Balance Sit<->stand:  CGA with RW   Social Interaction Patient was friendly and conversational.   Cognitive A&O X4   Comments Patient was assisted to his room and left seated in his recliner chair with all needs within reach.      Danielle Sahni, CTRS

## 2017-11-20 NOTE — PROGRESS NOTES
Florencia Stern MD,   Medical Director  [address] [facility], [address] [facility]  Tel: [phone]       D PROGRESS NOTE    Beatrice Anton  Admit Date: 11/9/2017  Admit Diagnosis: Left CVA; Stroke (cerebrum) (HCC)    Subjective     \"its a terrible day. \" claims he didn't sleep well. Anxious. Staff says that he sleeps more than he thinks but he isnt buying into that. No headache , cp, or sob. No n/v.  Affect blunted    Objective:     Current Facility-Administered Medications   Medication Dose Route Frequency    diphenhydrAMINE (BENADRYL) capsule 25 mg  25 mg Oral QHS    escitalopram oxalate (LEXAPRO) tablet 10 mg  10 mg Oral DAILY    temazepam (RESTORIL) capsule 30 mg  30 mg Oral QHS    amLODIPine (NORVASC) tablet 5 mg  5 mg Oral DAILY    alum-mag hydroxide-simeth (MYLANTA) oral suspension 30 mL  30 mL Oral Q4H PRN    magnesium hydroxide (MILK OF MAGNESIA) 400 mg/5 mL oral suspension 30 mL  30 mL Oral DAILY PRN    acetaminophen (TYLENOL) tablet 650 mg  650 mg Oral Q6H PRN    aspirin chewable tablet 81 mg  81 mg Oral DAILY    hydrALAZINE (APRESOLINE) tablet 25 mg  25 mg Oral QID PRN    docusate sodium (COLACE) capsule 100 mg  100 mg Oral DAILY    bisacodyl (DULCOLAX) suppository 10 mg  10 mg Rectal DAILY PRN    pantoprazole (PROTONIX) tablet 40 mg  40 mg Oral ACB&D    atorvastatin (LIPITOR) tablet 80 mg  80 mg Oral DAILY    finasteride (PROSCAR) tablet 5 mg  5 mg Oral DAILY    glipiZIDE (GLUCOTROL) tablet 20 mg  20 mg Oral ACB    benazepril/hydroCHLOROthiazide (LOTENSIN HCT) 10/12.5 mg   Oral DAILY    fluticasone (FLONASE) 50 mcg/actuation nasal spray 2 Spray  2 Spray Both Nostrils DAILY    loratadine (CLARITIN) tablet 10 mg  10 mg Oral QPM    sucralfate (CARAFATE) tablet 1 g  1 g Oral AC&HS    tamsulosin (FLOMAX) capsule 0.4 mg  0.4 mg Oral DAILY    metFORMIN (GLUCOPHAGE) tablet 500 mg  500 mg Oral TID WITH MEALS    insulin lispro (HUMALOG) injection   SubCUTAneous AC&HS    ondansetron (ZOFRAN ODT) tablet 4 mg  4 mg Oral Q6H PRN     Review of Systems:Denies chest pain, shortness of breath, cough, headache, visual problems, abdominal pain, dysurea, calf pain. Pertinent positives are as noted in the medical records and unremarkable otherwise. Visit Vitals    /82    Pulse 80    Temp 99.8 °F (37.7 °C)    Resp 16    Ht 6' 2\" (1.88 m)    Wt 197 lb (89.4 kg)    SpO2 96%    BMI 25.29 kg/m2        Physical Exam:   General: Alert and age appropriately oriented. No acute cardio respiratory distress. Affect blunted   HEENT: Normocephalic,no scleral icterus  Oral mucosa moist without cyanosis   Lungs: Clear to auscultation  bilaterally. Respiration even and unlabored   Heart: Regular rate and rhythm, S1, S2   No  murmurs, clicks, rub or gallops   Abdomen: Soft, non-tender, nondistended. Bowel sounds present. No organomegaly. Genitourinary: Benign . Neuromuscular:      Less dysarthric, speech much more intelligible  Strength  In the RUE 4+, right pronator drift continues  RLE 4+ overall prox, 5- distally  sens intact. Ox4   Skin/extremity: No rashes, no erythema.  No calf tenderness BLE  No edema                                                                            Functional Assessment:          Balance  Sitting - Static: Good (unsupported) (11/18/17 0925)  Sitting - Dynamic: Good (unsupported) (11/18/17 0925)  Standing - Static: Good (11/18/17 0925)  Standing - Dynamic : Impaired (11/18/17 0925)           Toileting  Cues:  (SBA for balance and safety) (11/11/17 1231)  Adaptive Equipment: Elevated seat;Grab bars (11/15/17 1008)         Latasha De Jesus Fall Risk Assessment:  Latasha De Jesus Fall Risk  Mobility: Ambulates or transfers with assist devices or assistance/unsteady gait (11/19/17 1453)  Mobility Interventions: Patient to call before getting OOB;Utilize walker, cane, or other assitive device (11/19/17 1457)  Mentation: Alert, oriented x 3 (11/19/17 1454)  Mentation Interventions: Adequate sleep, hydration, pain control (11/19/17 1454)  Medication: Patient receiving anticonvulsants, sedatives(tranquilizers), psychotropics or hypnotics, hypoglycemics, narcotics, sleep aids, antihypertensives, laxatives, or diuretics (11/19/17 1454)  Medication Interventions: Evaluate medications/consider consulting pharmacy; Patient to call before getting OOB (11/19/17 1454)  Elimination: Needs assistance with toileting (11/19/17 1454)  Elimination Interventions: Call light in reach; Patient to call for help with toileting needs (11/19/17 1454)  Prior Fall History: No (11/19/17 1454)  History of Falls Interventions: Door open when patient unattended (11/18/17 1941)  Total Score: 3 (11/19/17 1454)  Standard Fall Precautions: Yes (11/09/17 1132)  High Fall Risk: Yes (11/19/17 1454)     Speech Assessment:  Aspiration Signs/Symptoms: Delayed cough/throat clear (11/10/17 0910)      Ambulation:  Gait  Distance (ft): 250 Feet (ft) (11/18/17 0925)  Assistive Device: Belita Gallop, rolling (11/18/17 0925)  Rail Use: Both (11/17/17 1200)     Labs/Studies:  Recent Results (from the past 72 hour(s))   GLUCOSE, POC    Collection Time: 11/17/17 11:22 AM   Result Value Ref Range    Glucose (POC) 152 (H) 65 - 100 mg/dL   GLUCOSE, POC    Collection Time: 11/17/17  4:11 PM   Result Value Ref Range    Glucose (POC) 86 65 - 100 mg/dL   GLUCOSE, POC    Collection Time: 11/17/17  9:08 PM   Result Value Ref Range    Glucose (POC) 221 (H) 65 - 100 mg/dL   GLUCOSE, POC    Collection Time: 11/18/17  7:10 AM   Result Value Ref Range    Glucose (POC) 128 (H) 65 - 100 mg/dL   GLUCOSE, POC    Collection Time: 11/18/17 11:16 AM   Result Value Ref Range    Glucose (POC) 173 (H) 65 - 100 mg/dL   GLUCOSE, POC    Collection Time: 11/18/17  3:57 PM   Result Value Ref Range    Glucose (POC) 125 (H) 65 - 100 mg/dL   GLUCOSE, POC    Collection Time: 11/18/17  8:51 PM   Result Value Ref Range    Glucose (POC) 174 (H) 65 - 100 mg/dL   GLUCOSE, POC    Collection Time: 11/19/17  7:14 AM   Result Value Ref Range    Glucose (POC) 107 (H) 65 - 100 mg/dL   GLUCOSE, POC    Collection Time: 11/19/17 11:12 AM   Result Value Ref Range    Glucose (POC) 216 (H) 65 - 100 mg/dL   GLUCOSE, POC    Collection Time: 11/19/17  4:05 PM   Result Value Ref Range    Glucose (POC) 104 (H) 65 - 100 mg/dL   GLUCOSE, POC    Collection Time: 11/19/17  8:35 PM   Result Value Ref Range    Glucose (POC) 156 (H) 65 - 100 mg/dL   GLUCOSE, POC    Collection Time: 11/20/17  7:36 AM   Result Value Ref Range    Glucose (POC) 192 (H) 65 - 100 mg/dL       Assessment:     Problem List as of 11/20/2017  Date Reviewed: 10/23/2017          Codes Class Noted - Resolved    Stroke (cerebrum) ([facility] ICD-10-CM: I63.9  ICD-9-CM: 434.91  11/9/2017 - Present        Abdominal bloating ICD-10-CM: R14.0  ICD-9-CM: 787.3  10/23/2017 - Present        Nausea ICD-10-CM: R11.0  ICD-9-CM: 787.02  8/15/2017 - Present        Fatty liver ICD-10-CM: K76.0  ICD-9-CM: 571.8  6/28/2017 - Present        Lower abdominal pain ICD-10-CM: R10.30  ICD-9-CM: 789.09  6/19/2017 - Present        Neck pain ICD-10-CM: M54.2  ICD-9-CM: 723.1  12/27/2016 - Present        GERD (gastroesophageal reflux disease) ICD-10-CM: K21.9  ICD-9-CM: 530.81  6/23/2016 - Present        Type 2 diabetes mellitus ([facility] ICD-10-CM: E11.9  ICD-9-CM: 250.00  2/25/2015 - Present        Allergic rhinitis ICD-10-CM: J30.9  ICD-9-CM: 477.9  2/25/2015 - Present        HLD (hyperlipidemia) ICD-10-CM: E78.5  ICD-9-CM: 272.4  2/25/2015 - Present        Enlarged prostate with lower urinary tract symptoms (LUTS) ICD-10-CM: N40.1  ICD-9-CM: 600.01  2/25/2015 - Present        Essential hypertension ICD-10-CM: I10  ICD-9-CM: 401.9  2/25/2015 - Present        Asthma ICD-10-CM: J45.909  ICD-9-CM: 493.90  2/25/2015 - Present        RESOLVED: Delusional disorder ([facility] ICD-10-CM: F22  ICD-9-CM: 297.1  2/25/2015 - 6/28/2017        RESOLVED: Backache, unspecified ICD-10-CM: M54.9  ICD-9-CM: 724.5  2/25/2015 - 10/23/2017        RESOLVED: Urinary tract infection, site not specified ICD-10-CM: N39.0  ICD-9-CM: 599.0  2/25/2015 - 10/23/2017              Plan:         [place] Infarct, ischemic          Continue daily physician medical management:  Pneumonia prophylaxis- Incentive spirometer every hour while awake      Ischemic CVA; cont asa and lipitor; permissive htn      DVT risk / DVT Prophylaxis- Will require daily physician exam to assess for signs and symptoms as patient is at increased risk for of thromboembolism. Mobilization as tolerated. Intermittent pneumatic compression devices when in bed Thigh-high or knee-high thromboembolic deterrent hose when out of bed. Add subcut heparin       Pain Control: no current joint or muscle symptoms, essentially pain-free. Will require regular pain assessment and comprenhensive pain management,       Hypertension - BP uncontrolled, fluctuating, managed medically. Continue Lotensin and add prn hydralazine. His SBP upon arrival was >220 and DBP >110   monitor closely, consider adding metoprolol; want permissive htn for cerebral perfusion but goal 120-140s  -11/10 174/86 add norvasc  - 11/11 SBP- 143 this am, continues to improve, on norvasc and lotensin  -11/16 146/74; permissive htn ok      Diabetes mellitus - Uncontrolled. poor glycemic control. Will require daily, close FSG monitoring and medication adjustment to optimize glycemic control in setting of acute illness and hospitalization. Cont metformin and glucotrol 20mg. Add SSI.  Cont carb restricted diet, accuchecks qac and qhs  -11/10 BS fine  - BG - 121 this am, yesterday 121- 165 range  -11/14 consider changing to bid bs  -11/15 bs of 44 yesterday; 117 this am.; consider dropping glucotrol dose  -11/16 bs 110 this a.m ; no hypoglycemia past 24 hrs  -11/17 -171 acceptable  -11/20 104- 216 fairly acceptable      Urinary retention/ neurogenic bladder - schedule voids q6-8 hrs. Check post-void residual as needed; In and Out catheterize if post-void residual is more than 400 cc. Pt with hx of prostate issues . Cont flomax and proscar  11/17 no issues      bowel program - add colace and prn bowel program      GERD - resume PPI. At times may need additional antacids, Maalox prn. On omeprazole which is nonformulary, thus change to protonix 40mg bid.  -H pylori; cont Biaxin and amoxil and carafate. Will treat with dual abx for total of 2 weeks. Will f/u with GI post dc  - no gi complaints  -11/17 abx dc'd yesterday; cont GI prophlaxis  -11/20 no GERD symptoms      Asthma/allergic rhinitis; cont flonase and zyrtec.       Sleep mgmt-  will continue to increase prn trazodone to 100mg  -11/13 had difficult noc with restlessness, confusion; dc trazadone and try benadryl and restoril; monitor for confusion; could consider remeron  -11/14 a bit better noc; 11/15 cont current plan  -11/17 confirmed with staff that he IS sleeping and that he perseverates on thinking he did not. RN to continue to document (could consider a sleep log)  -11/20; still reports that he isnt sleeping. Will dose his restoril and benedryl together. I am going to make up a sleep log to document hourly on whether he is asleep or not.     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      Signed By: Shirin Dodd MD     November 20, 2017

## 2017-11-20 NOTE — PROGRESS NOTES
PHYSICAL THERAPY DAILY NOTE  Time In: 1301  Time Out: 1346  Patient Seen For: PM;Balance activities;Gait training;Patient education; Therapeutic exercise;Transfer training    Subjective: \"I'm doing good this afternoon. \" Patient agreeable to therapy. Objective: Vital Signs: No vitals taken this PM.    Pain level: No pain reported. Pain location: n/a  Pain interventions: n/a    Patient education: Educated patient on benefits of cardiovascular exercise. Interdisciplinary Communication: Communicated with Ayesha Winn regarding patient's POC. Other (comment) (Fall risk)  GROSS ASSESSMENT Daily Assessment            BED/MAT MOBILITY Daily Assessment            TRANSFERS Daily Assessment   Required for safety. Transfer Type: SPT with walker  Transfer Assistance : 5 (Stand-by assistance)  Sit to Stand Assistance: Stand-by assistance  Car Transfers: Not tested       GAIT Daily Assessment   Patient ambulated with improved step clearance R LE. Amount of Assistance: 5 (Stand-by assistance)  Distance (ft): 200 Feet (ft) (x2)  Assistive Device: Walker, rolling       STEPS or STAIRS Daily Assessment            BALANCE Daily Assessment   Patient performed standing dynamic balance activities with B LE- taking steps to each direction 3 sets of 5 repetitions. Sitting - Static: Good (unsupported)  Sitting - Dynamic: Good (unsupported)  Standing - Static: Fair  Standing - Dynamic : Impaired       WHEELCHAIR MOBILITY Daily Assessment            LOWER EXTREMITY EXERCISES Daily Assessment    Extremity: Both  Exercise Type #1: Other (comment) (Nustep x 10 minutes, Level 3)     Patient handed off to RT Bina in therapy gym. Assessment: Patient continues to make progress with independence with household ambulation. Patient demonstrated improved gait mechanics and safety with household ambulation. Plan of Care: Continue with POC and progress as tolerated.        Dianna Morse  11/20/2017

## 2017-11-20 NOTE — PROGRESS NOTES
Report received from [facility] on history and status. Pt sleeping on right side.

## 2017-11-20 NOTE — PROGRESS NOTES
11/20/17 1012   Time Spent With Patient   Time In 0745   Time Out 0860   Patient Seen For: AM;ADLs   Grooming   Grooming Assistance  SBA   Comments standing at sink; verbal cues for hand placement when leaning down to drink water    Upper Body Bathing   Bathing Assistance, Upper Mod I   Lower Body Bathing   Bathing Assistance, Lower  Mod I   Adaptive Equipment Grab bar   Position Performed Standing; Other (comment)  (seated on tub transfer bench )   Adaptive Equipment Tub bench   29 Amanda Louie (FIM Score) S   Upper Body Dressing    Dressing Assistance  S   Lower Body Dressing    Dressing Assistance  S   Leg Crossed Method Used No   Position Performed Bending forward method;Seated in chair;Standing   Functional Transfers   Toilet Transfer  Stand pivot transfer with walker   Amount of Assistance Required SBA   Tub or Shower Type Shower   Amount of Assistance Required SBA   Adaptive Equipment Tub transfer bench;Grab bars; Walker (comment)   S: \"I'm not good. I'm still not sleeping. \"   O: Patient presented seated up in recliner. Agreeable to treatment. Patient completed ADL; see above for FIMs. A: Patient appeared somewhat \"down\" today and attributes it to not sleeping well. Patient tolerated session well with no complaint of pain. P: Continue OT POC with focus on ADL/IADL skills, functional transfers, functional mobility, coordination, strength, static and dynamic balance, and activity tolerance to maximize safety and independence with ADLs and functional transfers. Patient was left seated up in recliner with call bell/all other needs in reach. Nurse present to administer medication. Interdisciplinary communication: Discussed patient's report of not sleeping with nurse and nurse reports that 3rd shift reported patient slept.      Rafy Brooks MS, OTR/L  11/20/2017

## 2017-11-20 NOTE — PROGRESS NOTES
Problem: Falls - Risk of  Goal: *Absence of Falls  Document Jaiden Fall Risk and appropriate interventions in the flowsheet.    Outcome: Progressing Towards Goal  Fall Risk Interventions:  Mobility Interventions: Patient to call before getting OOB, Utilize walker, cane, or other assitive device    Mentation Interventions: Adequate sleep, hydration, pain control    Medication Interventions: Evaluate medications/consider consulting pharmacy, Patient to call before getting OOB    Elimination Interventions: Call light in reach, Patient to call for help with toileting needs    History of Falls Interventions: Door open when patient unattended

## 2017-11-20 NOTE — PROGRESS NOTES
Assessment completed pt awake and sitting in recliner eating breakfast. Pt denies pain, denies shortness of breath. Pt c/o not getting enough rest, states he is not sleeping well. Pt was given Restoril 30 mg po last night about 9 o clock pm. Pt seemed discourage, continue to say he is not doing well. Pt given encouragement that he is doing well and some things takes a while before you are back to your self. Pt is on Lexapro 10 mg po. He has a good appetite he is A/O x 4 pt ambulates with a walker.  Pt encourage to call for any needs

## 2017-11-20 NOTE — PROGRESS NOTES
OT Daily Note  Time In 1118   Time Out 1158     Subjective: \"I don't think I'll be able to to do it. \"   Pain: none reported  Education: technique for completing Snap Kit, hand placement during transfers  Interdisciplinary Communication: nurse aware that patient back to room following therapy   Precautions: Other (comment) (fall risk)  Location on arrival: up in recliner    Transfers Daily Assessment   Patient transferred recliner to toilet to [facility] using SPT with RW with SBA. Patient ambulated from gym to room using RW with supervision. Making good progress with functional transfers. Self-Care Daily Assessment   Self Care Task: Toileting  Level of Assist: 5 (Supervision)  Adaptive Equipment: Elevated seat, Grab bars  Patient stood to urinate. Required one verbal cue to step to R to square hips to sink when washing hands. Progressing well. Continues to require cues to stand squarely in front of sink instead of with hips laterally rotated. Coordination Daily Assessment   Patient completed fine motor coordination and cognitive task x Snap Circuits Kit x Level 1 with moderate assistance for following multi-step instructions for task. Required cues throughout task for correct completion. Patient completed with RUE only. Required increased assistance with multi-step instructions for a novel task. RUE fine motor coordination improving daily. Patient was left seated up in recliner in room with call light/all needs in reach and in no distress. Assessment: Progressing well. Patient remained quiet and not as talkative this session as in previous sessions. Plan: Continue OT POC with focus on ADL/IADL skills, functional transfers, functional mobility, coordination, strength, static and dynamic balance, and activity tolerance to maximize safety and independence with ADLs and functional transfers.      Robert Chiang MS, OTR/L  11/20/2017

## 2017-11-20 NOTE — PROGRESS NOTES
End Of Shift Functional Summary, Nursing      TOILETING:  Does patient need assist with clothing management and/or pericare? No SBA    TOILET TRANSFER:  Pt requires SBA  Pt uses walker and grab bars     BLADDER:  Pt does not have a plunkett catheter that staff manages. Pt does take medication. Pt is continent. of bladder and voids in toilet  Pt has had 0 bladder accidents during this shift .  (An accident is when the episode is not contained in a brief AND/OR the clothing/linen requires changing/cleaning up.)    BOWEL:  Pt does take medication. Pt is continent of bowel and uses toilet. Pt has had 0 bowel accidents during this shift . (An accident is when the episode is not contained in a brief AND/OR the clothing/linen requires changing/cleaning up.)    BED/CHAIR TRANSFER  Pt requires standby assistance/setup. Patient requires the assistance of 1 staff member(s). Pt uses walker        EATING  Pt requires no assistance. Pt does not wear dentures. TUBE FEEDINGS:  Pt does not  receive nutrition through tube feedings. Patient requires minimal assistance with feedings such as opening bottle cap of water . Documentation reviewed and plan of care discussed/reviewed with   patient during the shift.

## 2017-11-20 NOTE — PROGRESS NOTES
11/20/17 0953   Time Spent With Patient   Time In [facility] 58   Time Out 0952   Patient Seen For: AM;Neuro-linguistics; Oral-motor exercises   Mental Status   Neurologic State Alert   Orientation Level Oriented X4   Cognition Appropriate decision making   Perception Appears intact   Perseveration No perseveration noted   Safety/Judgement Fall prevention   Pt completed oral motor exercises 2 x 10 with 90% accuracy. Pt completed word finding and problem solving tasks with 90% accuracy. Essential within reach. Pt in recliner.     Lili Trejo MA/JAJA/SLP

## 2017-11-21 LAB
APPEARANCE UR: CLEAR
BILIRUB UR QL: NEGATIVE
COLOR UR: YELLOW
GLUCOSE BLD STRIP.AUTO-MCNC: 101 MG/DL (ref 65–100)
GLUCOSE BLD STRIP.AUTO-MCNC: 103 MG/DL (ref 65–100)
GLUCOSE BLD STRIP.AUTO-MCNC: 110 MG/DL (ref 65–100)
GLUCOSE BLD STRIP.AUTO-MCNC: 110 MG/DL (ref 65–100)
GLUCOSE UR STRIP.AUTO-MCNC: NEGATIVE MG/DL
HGB UR QL STRIP: NEGATIVE
KETONES UR QL STRIP.AUTO: NEGATIVE MG/DL
LEUKOCYTE ESTERASE UR QL STRIP.AUTO: NEGATIVE
NITRITE UR QL STRIP.AUTO: NEGATIVE
PH UR STRIP: 5.5 [PH] (ref 5–9)
PROT UR STRIP-MCNC: NEGATIVE MG/DL
SP GR UR REFRACTOMETRY: 1.01 (ref 1–1.02)
UROBILINOGEN UR QL STRIP.AUTO: 0.2 EU/DL (ref 0.2–1)

## 2017-11-21 PROCEDURE — 97116 GAIT TRAINING THERAPY: CPT

## 2017-11-21 PROCEDURE — 51798 US URINE CAPACITY MEASURE: CPT

## 2017-11-21 PROCEDURE — 97535 SELF CARE MNGMENT TRAINING: CPT

## 2017-11-21 PROCEDURE — 99232 SBSQ HOSP IP/OBS MODERATE 35: CPT | Performed by: PHYSICAL MEDICINE & REHABILITATION

## 2017-11-21 PROCEDURE — 97530 THERAPEUTIC ACTIVITIES: CPT

## 2017-11-21 PROCEDURE — 82962 GLUCOSE BLOOD TEST: CPT

## 2017-11-21 PROCEDURE — 81003 URINALYSIS AUTO W/O SCOPE: CPT | Performed by: PHYSICAL MEDICINE & REHABILITATION

## 2017-11-21 PROCEDURE — 97110 THERAPEUTIC EXERCISES: CPT

## 2017-11-21 PROCEDURE — 92526 ORAL FUNCTION THERAPY: CPT

## 2017-11-21 PROCEDURE — 74011250637 HC RX REV CODE- 250/637: Performed by: PHYSICAL MEDICINE & REHABILITATION

## 2017-11-21 PROCEDURE — 65310000000 HC RM PRIVATE REHAB

## 2017-11-21 RX ORDER — ATORVASTATIN CALCIUM 80 MG/1
80 TABLET, FILM COATED ORAL DAILY
Qty: 30 TAB | Refills: 3 | Status: SHIPPED | OUTPATIENT
Start: 2017-11-22 | End: 2018-03-12 | Stop reason: SDUPTHER

## 2017-11-21 RX ORDER — AMLODIPINE BESYLATE 5 MG/1
5 TABLET ORAL DAILY
Qty: 30 TAB | Refills: 3 | Status: SHIPPED | OUTPATIENT
Start: 2017-11-22 | End: 2018-06-21 | Stop reason: SDUPTHER

## 2017-11-21 RX ORDER — DIPHENHYDRAMINE HCL 25 MG
50 CAPSULE ORAL
Status: DISCONTINUED | OUTPATIENT
Start: 2017-11-21 | End: 2017-11-22 | Stop reason: HOSPADM

## 2017-11-21 RX ORDER — SUCRALFATE 1 G/1
1 TABLET ORAL
Qty: 120 TAB | Refills: 2 | Status: SHIPPED | OUTPATIENT
Start: 2017-11-21 | End: 2018-03-21

## 2017-11-21 RX ORDER — ZOLPIDEM TARTRATE 5 MG/1
5 TABLET ORAL
Status: DISCONTINUED | OUTPATIENT
Start: 2017-11-21 | End: 2017-11-22 | Stop reason: HOSPADM

## 2017-11-21 RX ORDER — PANTOPRAZOLE SODIUM 40 MG/1
40 TABLET, DELAYED RELEASE ORAL
Qty: 30 TAB | Refills: 3 | Status: SHIPPED | OUTPATIENT
Start: 2017-11-21 | End: 2018-03-12 | Stop reason: SDUPTHER

## 2017-11-21 RX ORDER — GUAIFENESIN 100 MG/5ML
81 LIQUID (ML) ORAL DAILY
Qty: 30 TAB | Refills: 3 | Status: SHIPPED | OUTPATIENT
Start: 2017-11-22

## 2017-11-21 RX ORDER — ESCITALOPRAM OXALATE 10 MG/1
10 TABLET ORAL DAILY
Qty: 30 TAB | Refills: 3 | Status: SHIPPED | OUTPATIENT
Start: 2017-11-22 | End: 2018-02-14 | Stop reason: SDUPTHER

## 2017-11-21 RX ADMIN — METFORMIN HYDROCHLORIDE 500 MG: 500 TABLET, FILM COATED ORAL at 11:54

## 2017-11-21 RX ADMIN — SUCRALFATE 1 G: 1 TABLET ORAL at 05:32

## 2017-11-21 RX ADMIN — PANTOPRAZOLE SODIUM 40 MG: 40 TABLET, DELAYED RELEASE ORAL at 05:32

## 2017-11-21 RX ADMIN — SUCRALFATE 1 G: 1 TABLET ORAL at 11:55

## 2017-11-21 RX ADMIN — ATORVASTATIN CALCIUM 80 MG: 40 TABLET, FILM COATED ORAL at 08:48

## 2017-11-21 RX ADMIN — SUCRALFATE 1 G: 1 TABLET ORAL at 20:38

## 2017-11-21 RX ADMIN — DOCUSATE SODIUM 100 MG: 100 CAPSULE, LIQUID FILLED ORAL at 08:50

## 2017-11-21 RX ADMIN — SUCRALFATE 1 G: 1 TABLET ORAL at 16:47

## 2017-11-21 RX ADMIN — GLIPIZIDE 20 MG: 5 TABLET ORAL at 08:51

## 2017-11-21 RX ADMIN — ESCITALOPRAM OXALATE 10 MG: 10 TABLET ORAL at 08:48

## 2017-11-21 RX ADMIN — HYDROCHLOROTHIAZIDE: 12.5 CAPSULE ORAL at 08:49

## 2017-11-21 RX ADMIN — FINASTERIDE 5 MG: 5 TABLET, FILM COATED ORAL at 08:49

## 2017-11-21 RX ADMIN — METFORMIN HYDROCHLORIDE 500 MG: 500 TABLET, FILM COATED ORAL at 16:46

## 2017-11-21 RX ADMIN — METFORMIN HYDROCHLORIDE 500 MG: 500 TABLET, FILM COATED ORAL at 08:51

## 2017-11-21 RX ADMIN — ASPIRIN 81 MG CHEWABLE TABLET 81 MG: 81 TABLET CHEWABLE at 08:50

## 2017-11-21 RX ADMIN — AMLODIPINE BESYLATE 5 MG: 5 TABLET ORAL at 08:49

## 2017-11-21 RX ADMIN — LORATADINE 10 MG: 10 TABLET ORAL at 16:47

## 2017-11-21 RX ADMIN — ZOLPIDEM TARTRATE 5 MG: 5 TABLET ORAL at 20:38

## 2017-11-21 RX ADMIN — PANTOPRAZOLE SODIUM 40 MG: 40 TABLET, DELAYED RELEASE ORAL at 16:46

## 2017-11-21 RX ADMIN — FLUTICASONE PROPIONATE 2 SPRAY: 50 SPRAY, METERED NASAL at 08:52

## 2017-11-21 RX ADMIN — TAMSULOSIN HYDROCHLORIDE 0.4 MG: 0.4 CAPSULE ORAL at 08:50

## 2017-11-21 NOTE — DISCHARGE SUMMARY
REHABILITATION DISCHARGE SUMMARY     Date of admission to [facility]; 11/09/2017  Discharge Date: 11/22/2017    Primary Care Physician: Dr. Lydia Juan    Admission Condition: good  Discharged Condition: good    Hospital Course: The patient was admitted to [facility] on 11/9/2017 s/p pontine infarct with functional deficits  Mr. Roberta Watson is an extremely pleasant right handed 82yo AA male with htn, peripheral neuropathy, vertigo, Type 2 DM, thyroid disease and asthma, recent dx of H pylori who was admitted to [facility] on 11/6 with right sided weakness and slurred speech. One week prior to presentation he had an EGD and was placed on carafate and prilosec. He noticed the weakness of 11/1 but though it was maybe a side effect of the new medications. The following day he was dragging his right foot more and had slurring speech. He started using a walker at home due to RLE weakness. He finally decided to present to the ED on 11/6 due to the fact that the symptoms had not improved. Head CT was negative. He received notice that day from his GIs office regarding H pylori and was started on amoxil and biaxin for 14d. MRI confirmed an acute left pontine ischemic stroke. He was placed on Lipitor and ASA. Carotid duplex revealed elevated flow velocities in the common carotid arteries suggesting proximal stenosis, thus a CT angio of the carotids was performed, revealing moderate atherosclerotic calcification involving thoracic aorta with ectasia of the descending aorta. ( this will require monitoring by PCP as an outpt.) There was no appreciable stenosis of the subclavian or common carotid arteries. There was no appreciable stenosis in either carotid artery. I do not have the ECHO report documented in the records from [facility]. His BP has been poorly controlled and his bs have varied. He is tolerating a NDD3 diet with thin liquids.     Rehabilitation Course: From a medical standpoint, Mr Zuleta's BP was poorly controlled on admission at which time , he was on Lotensin HCT 10/12.5mg daily. Norvasc 5mg was added and his BP is better controlled with SBPs 130s-140's. His bs are controlled with diet and Glucophage 500mg tid and Glucotrol 20mg daily per home regimen. He will remain on Lipitor and ASA from a stroke standpt. Plavix was not started due to recent gi issues with gastritis and H pylori with high risk of bleed. He has completed treatment for H pylori but will continue on Carafate and Protonix bid. His CMP is normal with good renal fxn and normal electrolytes. His hgb is 14.2  He has had difficulty with frequent urination at noc, every two hours, leading to poor sleep habits. He prefers to get OOB to bathroom rather than using the urinal at noc. We tried Trazadone which didn't help. He was on Benadryl and Restoril, and we tried Ambien, with no benefit as well. Urinalysis was neg and he has had no urinary retention per bladder scan. He continues on flomax and proscar. I think he will rest better at home. Nursing has reported that he sleeps more than he thinks. This has caused pt distress and worry that he wont sleep at home. He could possibly have a low capacity bladder causing him to frequently urinate. Possibly need to stop proscar and consider ditropan if frequency is due to bladder spasms. Needs to see a urologist. This was d/w pt on day of dc and he will d/w his PCP, Dr. Hui Mojica. Functional Level On Admission: CGA to min A with mobility and self care  Functional Level At Discharge:   Grooming   Grooming Assistance  SBA   Comments stood at sink to brush teeth for ~3 minutes, good placement of RW.     Upper Body Bathing   Bathing Assistance, Upper I   Upper Body  Training to use affected extremity as a fine motor assistance   Position Performed Seated in chair   Adaptive Equipment Tub bench   Lower Body Bathing   Bathing Assistance, Lower  Mod I   Adaptive Equipment Grab bar   Position Performed Seated in chair;Standing   Adaptive Equipment Tub bench   Comments good safety awareness during shower   Toileting   Toileting Assistance (FIM Score) S   Upper Body Dressing    Dressing Assistance  S   Comments s/u assist   Lower Body Dressing    Dressing Assistance  S   Leg Crossed Method Used No   Position Performed Seated in chair;Standing   Comments good balance in stance, good ROM to complete LB dressing. s/u assist   Functional Transfers   Tub or Shower Type Shower   Amount of Assistance Required SBA   Adaptive Equipment Grab bars; Tub transfer bench;Walker (comment)      TRANSFERS Daily Assessment   Required for safety. Transfer Type: SPT with walker  Transfer Assistance : 5 (Supervision/setup)  Sit to Stand Assistance: Supervision  Car Transfers: Not tested         GAIT Daily Assessment   Patient ambulated with upright posture, intermittent foot flat at IC of R LE- verbal cues for heel strike improved gait sequence. Amount of Assistance: 5 (Stand-by assistance)  Distance (ft): 200 Feet (ft) (x3)  Assistive Device: Walker, rolling         STEPS or STAIRS Daily Assessment   Patient ascended one step with use of RW leading with L LE, and descended one step with use of RW leading with R LE. Steps/Stairs Ambulated (#): 1  Level of Assist : 5 (Stand-by assistance)  Rail Use: None         BALANCE Daily Assessment     Sitting - Static: Good (unsupported)  Sitting - Dynamic: Good (unsupported)  Standing - Static: Good  Standing - Dynamic : Impaired         WHEELCHAIR MOBILITY Daily Assessment               LOWER EXTREMITY EXERCISES Daily Assessment     Extremity: Both  Exercise Type #1: Supine lower extremity strengthening  Sets Performed: 1  Reps Performed: 15  Level of Assist: Supervision      Mental Status   Neurologic State Alert   Orientation Level Oriented X4   Cognition Appropriate decision making   Perception Appears intact   Perseveration No perseveration noted   Safety/Judgement Fall prevention   Wife present. Questions answered. Pt completed oral motor exercises 2 x 10 with 90% accuracy. Pt has reached max rehab potential at this time and will be discharged to home tomorrow with wife. No further ST indicated at this time.         Past Medical History:   Diagnosis Date    Abnormal weight gain     Acute bronchitis     Acute sinusitis, unspecified     Allergic rhinitis, cause unspecified 2/25/2015    Backache, unspecified 2/25/2015    Bladder neck obstruction     Carpal tunnel syndrome     Conjunctivitis unspecified     Cough     Delusional disorder(297.1) 2/25/2015    Dermatophytosis of nail     Dizziness and giddiness     Dyspepsia and other specified disorders of function of stomach     Hemorrhage of rectum and anus     Hypertrophy of prostate with urinary obstruction and other lower urinary tract symptoms (LUTS) 2/25/2015    Impotence of organic origin     Lipoma of unspecified site     Nausea alone     Nocturia     Nonspecific abnormal finding in stool contents     Other and unspecified hyperlipidemia 2/25/2015    Other malaise and fatigue     Other persistent mental disorders due to conditions classified elsewhere     Pain in joint, lower leg     Rash and other nonspecific skin eruption     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 or other cause, without mention of thyrotoxic crisis or storm     Type II or unspecified type diabetes mellitus without mention of complication, not stated as uncontrolled 2/25/2015    Unspecified asthma(493.90) 2/25/2015    Unspecified essential hypertension 2/25/2015    Unspecified hereditary and idiopathic peripheral neuropathy     Unspecified paranoid state     Unspecified vertiginous syndromes and labyrinthine disorders     Urinary frequency     Urinary tract infection, site not specified 2/25/2015      Past Surgical History:   Procedure Laterality Date    HX HEMORRHOIDECTOMY      HX OTHER SURGICAL      Keloid Surgery     HX OTHER SURGICAL Hydrocele      Family History   Problem Relation Age of Onset    Diabetes Mother      DM    Heart Disease Father      CHF    Heart Disease Sister      heart trouble of some sort    Cancer Sister      BREAST CANCER      Social History   Substance Use Topics    Smoking status: Former Smoker    Smokeless tobacco: Never Used    Alcohol use No      Comment: rare     Prior to Admission medications    Medication Sig Start Date End Date Taking? Authorizing Provider   amLODIPine (NORVASC) 5 mg tablet Take 1 Tab by mouth daily. 11/22/17  Yes Tasha Kirby MD   aspirin 81 mg chewable tablet Take 1 Tab by mouth daily. 11/22/17  Yes Tasha Kirby MD   atorvastatin (LIPITOR) 80 mg tablet Take 1 Tab by mouth daily. 11/22/17  Yes Tasha Kirby MD   escitalopram oxalate (LEXAPRO) 10 mg tablet Take 1 Tab by mouth daily. 11/22/17  Yes Tasha Kirby MD   pantoprazole (PROTONIX) 40 mg tablet Take 1 Tab by mouth Before breakfast and dinner. Indications: gastroesophageal reflux disease 11/21/17  Yes Tasha Kirby MD   sucralfate (CARAFATE) 1 gram tablet Take 1 Tab by mouth Before breakfast, lunch, dinner and at bedtime. 11/21/17  Yes Tasha Kirby MD   lisinopril-hydroCHLOROthiazide (PRINZIDE, ZESTORETIC) 10-12.5 mg per tablet Take 1 Tab by mouth daily. TAKE ONE TABLET BY MOUTH ONCE DAILY 10/30/17   Luanne Eid MD   tamsulosin [facility]) 0.4 mg capsule Take 2 Caps by mouth daily.  10/23/17   Luanne Eid MD   pravastatin (PRAVACHOL) 40 mg tablet TAKE ONE TABLET BY MOUTH NIGHTLY 8/15/17   Luanne Eid MD   finasteride (PROSCAR) 5 mg tablet TAKE ONE TABLET BY MOUTH ONCE DAILY 8/15/17   Luanne Eid MD   glipiZIDE SR (GLUCOTROL XL) 10 mg CR tablet TAKE TWO TABLETS BY MOUTH ONCE DAILY 8/15/17   Luanne Eid MD   metFORMIN (GLUCOPHAGE) 500 mg tablet TAKE ONE TABLET BY MOUTH THREE TIMES DAILY 8/15/17   Luanne Eid MD   triamcinolone (NASACORT AQ) 55 mcg nasal inhaler 2 Sprays daily. Historical Provider   glucose blood VI test strips (FREESTYLE INSULINX) strip Check sugars 1 time daily or as directed. 6/23/16   Barry Sotelo MD   cetirizine (ZYRTEC) 10 mg tablet Take  by mouth daily.     Historical Provider     Allergies   Allergen Reactions    Hay Fever And Allergy Relief Runny Nose        Lab Review:   Recent Results (from the past 72 hour(s))   GLUCOSE, POC    Collection Time: 11/18/17  3:57 PM   Result Value Ref Range    Glucose (POC) 125 (H) 65 - 100 mg/dL   GLUCOSE, POC    Collection Time: 11/18/17  8:51 PM   Result Value Ref Range    Glucose (POC) 174 (H) 65 - 100 mg/dL   GLUCOSE, POC    Collection Time: 11/19/17  7:14 AM   Result Value Ref Range    Glucose (POC) 107 (H) 65 - 100 mg/dL   GLUCOSE, POC    Collection Time: 11/19/17 11:12 AM   Result Value Ref Range    Glucose (POC) 216 (H) 65 - 100 mg/dL   GLUCOSE, POC    Collection Time: 11/19/17  4:05 PM   Result Value Ref Range    Glucose (POC) 104 (H) 65 - 100 mg/dL   GLUCOSE, POC    Collection Time: 11/19/17  8:35 PM   Result Value Ref Range    Glucose (POC) 156 (H) 65 - 100 mg/dL   GLUCOSE, POC    Collection Time: 11/20/17  7:36 AM   Result Value Ref Range    Glucose (POC) 192 (H) 65 - 100 mg/dL   GLUCOSE, POC    Collection Time: 11/20/17 11:18 AM   Result Value Ref Range    Glucose (POC) 173 (H) 65 - 100 mg/dL   GLUCOSE, POC    Collection Time: 11/20/17  4:18 PM   Result Value Ref Range    Glucose (POC) 113 (H) 65 - 100 mg/dL   GLUCOSE, POC    Collection Time: 11/20/17  8:44 PM   Result Value Ref Range    Glucose (POC) 114 (H) 65 - 100 mg/dL   GLUCOSE, POC    Collection Time: 11/21/17  7:16 AM   Result Value Ref Range    Glucose (POC) 110 (H) 65 - 100 mg/dL   URINALYSIS W/ RFLX MICROSCOPIC    Collection Time: 11/21/17 11:08 AM   Result Value Ref Range    Color YELLOW      Appearance CLEAR      Specific gravity 1.010 1.001 - 1.023      pH (UA) 5.5 5.0 - 9.0      Protein NEGATIVE  NEG mg/dL    Glucose NEGATIVE  mg/dL    Ketone NEGATIVE  NEG mg/dL    Bilirubin NEGATIVE  NEG      Blood NEGATIVE  NEG      Urobilinogen 0.2 0.2 - 1.0 EU/dL    Nitrites NEGATIVE  NEG      Leukocyte Esterase NEGATIVE  NEG     GLUCOSE, POC    Collection Time: 11/21/17 11:22 AM   Result Value Ref Range    Glucose (POC) 103 (H) 65 - 100 mg/dL       Functional Assessment:          Balance  Sitting - Static: Good (unsupported) (11/21/17 1000)  Sitting - Dynamic: Good (unsupported) (11/21/17 1000)  Standing - Static: Good (11/21/17 1000)  Standing - Dynamic : Impaired (11/21/17 1000)           Toileting  Cues:  (SBA for balance and safety) (11/11/17 1231)  Adaptive Equipment: Elevated seat;Grab bars (11/15/17 1008)         [place]iden Fall Risk Assessment:  Marcela Sawyer Fall Risk  Mobility: Ambulates or transfers with assist devices or assistance/unsteady gait (11/21/17 0730)  Mobility Interventions: Patient to call before getting OOB;Utilize walker, cane, or other assitive device (11/21/17 0730)  Mentation: Alert, oriented x 3 (11/21/17 0730)  Mentation Interventions: Adequate sleep, hydration, pain control (11/21/17 0730)  Medication: Patient receiving anticonvulsants, sedatives(tranquilizers), psychotropics or hypnotics, hypoglycemics, narcotics, sleep aids, antihypertensives, laxatives, or diuretics (11/21/17 0730)  Medication Interventions: Evaluate medications/consider consulting pharmacy; Patient to call before getting OOB (11/21/17 0730)  Elimination: Needs assistance with toileting (11/21/17 0730)  Elimination Interventions: Call light in reach; Patient to call for help with toileting needs (11/21/17 0730)  Prior Fall History: No (11/21/17 0730)  History of Falls Interventions: Door open when patient unattended (11/20/17 1916)  Total Score: 3 (11/21/17 0730)  Standard Fall Precautions: Yes (11/09/17 1132)  High Fall Risk: Yes (11/21/17 0730)     Speech Assessment:  Aspiration Signs/Symptoms: Delayed cough/throat clear (11/10/17 0910)      Ambulation:  Gait  Distance (ft): 200 Feet (ft) (x3) (11/21/17 1000)  Assistive Device: Walker, rolling (11/21/17 1000)  Rail Use: None (11/21/17 1000)     Visit Vitals    /79    Pulse 66    Temp 98.3 °F (36.8 °C)    Resp 16    Ht 6' 2\" (1.88 m)    Wt 197 lb (89.4 kg)    SpO2 98%    BMI 25.29 kg/m2      Intake and Output:    11/19 1901 - 11/21 0700  In: 730 [P.O.:730]  Out: 2100 [Urine:2100]    Discharge Exam:  General: Alert and age appropriately oriented. No acute cardio respiratory distress. Affect blunted   HEENT: Normocephalic,no scleral icterus  Oral mucosa moist without cyanosis; slt right facial weakness   Lungs: Clear to auscultation  bilaterally. Respiration even and unlabored   Heart: Regular rate and rhythm, S1, S2   No  murmurs, clicks, rub or gallops   Abdomen: Soft, non-tender, nondistended. Bowel sounds present. No organomegaly. Genitourinary: Benign . Neuromuscular:     Speech clearer, right sided strength at least 4+ 5-  sens intact, cogn clear. Right pronator drift   Skin/extremity: No rashes, no erythema.  No calf tenderness BLE  No edema       Problem List as of 11/21/2017  Date Reviewed: 10/23/2017          Codes Class Noted - Resolved    Stroke (cerebrum) ([facility] ICD-10-CM: I63.9  ICD-9-CM: 434.91  11/9/2017 - Present        Abdominal bloating ICD-10-CM: R14.0  ICD-9-CM: 787.3  10/23/2017 - Present        Nausea ICD-10-CM: R11.0  ICD-9-CM: 787.02  8/15/2017 - Present        Fatty liver ICD-10-CM: K76.0  ICD-9-CM: 571.8  6/28/2017 - Present        Lower abdominal pain ICD-10-CM: R10.30  ICD-9-CM: 789.09  6/19/2017 - Present        Neck pain ICD-10-CM: M54.2  ICD-9-CM: 723.1  12/27/2016 - Present        GERD (gastroesophageal reflux disease) ICD-10-CM: K21.9  ICD-9-CM: 530.81  6/23/2016 - Present        Type 2 diabetes mellitus ([facility] ICD-10-CM: E11.9  ICD-9-CM: 250.00  2/25/2015 - Present        Allergic rhinitis ICD-10-CM: J30.9  ICD-9-CM: 477.9 2/25/2015 - Present        HLD (hyperlipidemia) ICD-10-CM: E78.5  ICD-9-CM: 272.4  2/25/2015 - Present        Enlarged prostate with lower urinary tract symptoms (LUTS) ICD-10-CM: N40.1  ICD-9-CM: 600.01  2/25/2015 - Present        Essential hypertension ICD-10-CM: I10  ICD-9-CM: 401.9  2/25/2015 - Present        Asthma ICD-10-CM: J45.909  ICD-9-CM: 493.90  2/25/2015 - Present        RESOLVED: Delusional disorder ([facility] ICD-10-CM: F22  ICD-9-CM: 297.1  2/25/2015 - 6/28/2017        RESOLVED: Backache, unspecified ICD-10-CM: M54.9  ICD-9-CM: 724.5  2/25/2015 - 10/23/2017        RESOLVED: Urinary tract infection, site not specified ICD-10-CM: N39.0  ICD-9-CM: 599.0  2/25/2015 - 10/23/2017              Discharge Instructions:   1. Diet: Diabetic Diet  2. Activity: Activity as tolerated but no driving  3. Wound Care: None needed  Current Discharge Medication List      START taking these medications    Details   amLODIPine (NORVASC) 5 mg tablet Take 1 Tab by mouth daily. Qty: 30 Tab, Refills: 3    Associated Diagnoses: Essential hypertension      aspirin 81 mg chewable tablet Take 1 Tab by mouth daily. Qty: 30 Tab, Refills: 3    Associated Diagnoses: Type 2 diabetes mellitus without complication, without long-term current use of insulin ([facility] Seasonal allergic rhinitis, unspecified chronicity, unspecified trigger; Mixed hyperlipidemia; Benign prostatic hyperplasia with nocturia; Essential hypertension; Mild asthma without complication, unspecified whether persistent; Gastroesophageal reflux disease, esophagitis presence not specified; Neck pain; Lower abdominal pain; Fatty liver; Nausea; Abdominal bloating      atorvastatin (LIPITOR) 80 mg tablet Take 1 Tab by mouth daily. Qty: 30 Tab, Refills: 3    Associated Diagnoses: Type 2 diabetes mellitus without complication, without long-term current use of insulin ([facility] Seasonal allergic rhinitis, unspecified chronicity, unspecified trigger; Mixed hyperlipidemia;  Benign prostatic hyperplasia with nocturia; Essential hypertension; Mild asthma without complication, unspecified whether persistent; Gastroesophageal reflux disease, esophagitis presence not specified; Neck pain; Lower abdominal pain; Fatty liver; Nausea; Abdominal bloating; Cerebrovascular accident (CVA) due to thrombosis of precerebral artery (HCC)      escitalopram oxalate (LEXAPRO) 10 mg tablet Take 1 Tab by mouth daily. Qty: 30 Tab, Refills: 3    Associated Diagnoses: Depression, unspecified depression type      pantoprazole (PROTONIX) 40 mg tablet Take 1 Tab by mouth Before breakfast and dinner. Indications: gastroesophageal reflux disease  Qty: 30 Tab, Refills: 3    Associated Diagnoses: Type 2 diabetes mellitus without complication, without long-term current use of insulin ([address] Seasonal allergic rhinitis, unspecified chronicity, unspecified trigger; Mixed hyperlipidemia; Benign prostatic hyperplasia with nocturia; Essential hypertension; Mild asthma without complication, unspecified whether persistent; Gastroesophageal reflux disease, esophagitis presence not specified; Neck pain; Lower abdominal pain; Fatty liver; Nausea; Abdominal bloating      sucralfate (CARAFATE) 1 gram tablet Take 1 Tab by mouth Before breakfast, lunch, dinner and at bedtime. Qty: 120 Tab, Refills: 2    Associated Diagnoses: Type 2 diabetes mellitus without complication, without long-term current use of insulin ([address] Seasonal allergic rhinitis, unspecified chronicity, unspecified trigger; Mixed hyperlipidemia; Benign prostatic hyperplasia with nocturia; Essential hypertension; Mild asthma without complication, unspecified whether persistent; Gastroesophageal reflux disease, esophagitis presence not specified; Neck pain; Lower abdominal pain; Fatty liver; Nausea;  Abdominal bloating; Cerebrovascular accident (CVA) due to thrombosis of precerebral artery ([address]         CONTINUE these medications which have NOT CHANGED    Details lisinopril-hydroCHLOROthiazide (PRINZIDE, ZESTORETIC) 10-12.5 mg per tablet Take 1 Tab by mouth daily. TAKE ONE TABLET BY MOUTH ONCE DAILY  Qty: 90 Tab, Refills: 3      tamsulosin (FLOMAX) 0.4 mg capsule Take 2 Caps by mouth daily. Qty: 180 Cap, Refills: 3      finasteride (PROSCAR) 5 mg tablet TAKE ONE TABLET BY MOUTH ONCE DAILY  Qty: 90 Tab, Refills: 3      glipiZIDE SR (GLUCOTROL XL) 10 mg CR tablet TAKE TWO TABLETS BY MOUTH ONCE DAILY  Qty: 180 Tab, Refills: 3      metFORMIN (GLUCOPHAGE) 500 mg tablet TAKE ONE TABLET BY MOUTH THREE TIMES DAILY  Qty: 270 Tab, Refills: 3      triamcinolone (NASACORT AQ) 55 mcg nasal inhaler 2 Sprays daily. glucose blood VI test strips (FREESTYLE INSULINX) strip Check sugars 1 time daily or as directed. Qty: 100 Strip, Refills: 3      cetirizine (ZYRTEC) 10 mg tablet Take  by mouth daily. ADDED; klonopin 0.5 to 1mg po qhs for sleep   STOP taking these medications       pravastatin (PRAVACHOL) 40 mg tablet Comments:   Reason for Stopping:               Rehabilitation Management:   1. Devices: Walkers, Type: Rolling Walker  2. Consult: Rehab team including PT, OT, recreational therapy, and  and Speech therapy    Disposition: home with outpt PT and OT    Follow-up with Dr Travis Mroeland of neuro, Dr Jernigan Reach PCP and myself as scheduled.   >30min  Signed By: Kiel Reid MD     November 22, 2017

## 2017-11-21 NOTE — PROGRESS NOTES
OT Daily Note    Time In 1115   Time Out 1155     Subjective:\"I like working those pegs\" Agreeable to therapy. Pain:Denied during session. Interdisciplinary Communication: Collaborated with PT and patient is making progress towards goals. Precautions: Other (comment) (fall risk)    Cognition/FMC Daily Assessment   Snap board completed 1 design with step by step instruction, patient able to follow 1 step directions well, but not able to initiate steps without VC's, sounds similar to yesterdays session with possible slight improvements, RUE used well and improving. Small colored pegs used to replicate design matching colors with 100% accuracy and extra time all with RUE. Ended session: In recliner all needs within reach.      Gigi Garcia OTR/L

## 2017-11-21 NOTE — PROGRESS NOTES
Spouse in for family training; went well. Outpatient PT and OT to be ordered. Discussed with patient and spouse - prefers [facility]. Referral faxed. They will contact pt with appointment time. RW ordered from [employer]. Provided patient with application and RX for disabled placard. Continue to follow.

## 2017-11-21 NOTE — PROGRESS NOTES
11/21/17 1059   Time Spent With Patient   Time In 1036   Time Out 1100   Patient Seen For: AM;Oral-motor exercises; Family education   Mental Status   Neurologic State Alert   Orientation Level Oriented X4   Cognition Appropriate decision making   Perception Appears intact   Perseveration No perseveration noted   Safety/Judgement Fall prevention   Wife present. Questions answered. Pt completed oral motor exercises 2 x 10 with 90% accuracy. Pt has reached max rehab potential at this time and will be discharged to home tomorrow with wife. No further ST indicated at this time.    Catie Kirby MA/JAJA/SLP

## 2017-11-21 NOTE — PROGRESS NOTES
End Of Shift Functional Summary, Nursing      TOILETING:  Does patient need assist with clothing management and/or mena care? No    TOILET TRANSFER:  Pt requires standby assistance/setup. Pt uses walker. BLADDER:  Pt does not have a plunkett catheter that staff manages. Pt does take medication. Pt is continent. of bladder and voids in toilet    Pt has had 0 bladder accidents during this shift  (An accident is when the episode is not contained in a brief AND/OR the clothing/linen requires changing/cleaning up.)    BOWEL:  Pt does take medication. Pt is continent of bowel and uses toilet. Pt has had 0 bowel accidents during this shift . (An accident is when the episode is not contained in a brief AND/OR the clothing/linen requires changing/cleaning up.)    BED/CHAIR TRANSFER  Pt requires standby assistance/setup. Patient requires the assistance of 1 staff member(s).   Pt uses walker

## 2017-11-21 NOTE — PROGRESS NOTES
End Of Shift Functional Summary, Nursing      TOILETING:  Does patient need assist with clothing management and/or pericare? No    TOILET TRANSFER:  Pt requires minimal assistance. Pt uses walker. BLADDER:  Pt does not have a plunkett catheter that staff manages. Pt does not take medication. Pt is continent. of bladder and voids in toilet Pt has had 0 bladder accidents during this shift  (An accident is when the episode is not contained in a brief AND/OR the clothing/linen requires changing/cleaning up.)    BED/CHAIR TRANSFER  Pt requires standby assistance/setup. Patient requires the assistance of 1 staff member(s). Pt uses walker             EATING  Pt requires no assistance. Pt does not wear dentures. TUBE FEEDINGS:  Pt does not  receive nutrition through tube feedings. Documentation reviewed and plan of care discussed/reviewed with   patient assistant during the shift.

## 2017-11-21 NOTE — PROGRESS NOTES
Florida Schmidt MD,   Medical Director  [address] [facility], [address] [facility]  Tel: [phone]       [facility] PROGRESS NOTE    Lily Posey  Admit Date: 11/9/2017  Admit Diagnosis: Left CVA; Stroke (cerebrum) (HCC)    Subjective   Didn't sleep well again. This is very frustrating to pt. Per RN, he gets up to void every 2hrs, only used urinal once; wants to get up and use commode.  Hit or miss whether he can fall back to sleep or not    Objective:     Current Facility-Administered Medications   Medication Dose Route Frequency    diphenhydrAMINE (BENADRYL) capsule 25 mg  25 mg Oral QHS    escitalopram oxalate (LEXAPRO) tablet 10 mg  10 mg Oral DAILY    temazepam (RESTORIL) capsule 30 mg  30 mg Oral QHS    amLODIPine (NORVASC) tablet 5 mg  5 mg Oral DAILY    alum-mag hydroxide-simeth (MYLANTA) oral suspension 30 mL  30 mL Oral Q4H PRN    magnesium hydroxide (MILK OF MAGNESIA) 400 mg/5 mL oral suspension 30 mL  30 mL Oral DAILY PRN    acetaminophen (TYLENOL) tablet 650 mg  650 mg Oral Q6H PRN    aspirin chewable tablet 81 mg  81 mg Oral DAILY    hydrALAZINE (APRESOLINE) tablet 25 mg  25 mg Oral QID PRN    docusate sodium (COLACE) capsule 100 mg  100 mg Oral DAILY    bisacodyl (DULCOLAX) suppository 10 mg  10 mg Rectal DAILY PRN    pantoprazole (PROTONIX) tablet 40 mg  40 mg Oral ACB&D    atorvastatin (LIPITOR) tablet 80 mg  80 mg Oral DAILY    finasteride (PROSCAR) tablet 5 mg  5 mg Oral DAILY    glipiZIDE (GLUCOTROL) tablet 20 mg  20 mg Oral ACB    benazepril/hydroCHLOROthiazide (LOTENSIN HCT) 10/12.5 mg   Oral DAILY    fluticasone (FLONASE) 50 mcg/actuation nasal spray 2 Spray  2 Spray Both Nostrils DAILY    loratadine (CLARITIN) tablet 10 mg  10 mg Oral QPM    sucralfate (CARAFATE) tablet 1 g  1 g Oral AC&HS    tamsulosin (FLOMAX) capsule 0.4 mg  0.4 mg Oral DAILY    metFORMIN (GLUCOPHAGE) tablet 500 mg  500 mg Oral TID WITH MEALS    insulin lispro (HUMALOG) injection   SubCUTAneous AC&HS    ondansetron (ZOFRAN ODT) tablet 4 mg  4 mg Oral Q6H PRN     Review of Systems:Denies chest pain, shortness of breath, cough, headache, visual problems, abdominal pain, dysurea, calf pain. Pertinent positives are as noted in the medical records and unremarkable otherwise. Visit Vitals    /68    Pulse 69    Temp 98 °F (36.7 °C)    Resp 16    Ht 6' 2\" (1.88 m)    Wt 197 lb (89.4 kg)    SpO2 97%    BMI 25.29 kg/m2        Physical Exam:   General: Alert and age appropriately oriented. No acute cardio respiratory distress. Affect blunted   HEENT: Normocephalic,no scleral icterus  Oral mucosa moist without cyanosis; slt right facial weakness   Lungs: Clear to auscultation  bilaterally. Respiration even and unlabored   Heart: Regular rate and rhythm, S1, S2   No  murmurs, clicks, rub or gallops   Abdomen: Soft, non-tender, nondistended. Bowel sounds present. No organomegaly. Genitourinary: Benign . Neuromuscular:      Speech clearer, right sided strength at least 4+ 5-  sens intact, cogn clear. Right pronator drift   Skin/extremity: No rashes, no erythema.  No calf tenderness BLE  No edema                                                                            Functional Assessment:          Balance  Sitting - Static: Good (unsupported) (11/20/17 1400)  Sitting - Dynamic: Good (unsupported) (11/20/17 1400)  Standing - Static: Fair (11/20/17 1400)  Standing - Dynamic : Impaired (11/20/17 1400)           Toileting  Cues:  (SBA for balance and safety) (11/11/17 1231)  Adaptive Equipment: Elevated seat;Grab bars (11/15/17 1008)         Jaiden Fall Risk Assessment:  Maribel Lopez Fall Risk  Mobility: Ambulates or transfers with assist devices or assistance/unsteady gait (11/20/17 1916)  Mobility Interventions: Patient to call before getting OOB;Utilize walker, cane, or other assitive device (11/20/17 1916)  Mentation: Alert, oriented x 3 (11/20/17 1916)  Mentation Interventions: Adequate sleep, hydration, pain control;Door open when patient unattended;More frequent rounding; Toileting rounds;Update white board (11/20/17 1916)  Medication: Patient receiving anticonvulsants, sedatives(tranquilizers), psychotropics or hypnotics, hypoglycemics, narcotics, sleep aids, antihypertensives, laxatives, or diuretics (11/20/17 1916)  Medication Interventions: Patient to call before getting OOB; Teach patient to arise slowly (11/20/17 1916)  Elimination: Needs assistance with toileting (11/20/17 1916)  Elimination Interventions: Call light in reach; Patient to call for help with toileting needs; Toilet paper/wipes in reach; Toileting schedule/hourly rounds;Urinal in reach (11/20/17 1916)  Prior Fall History: No (11/20/17 1916)  History of Falls Interventions: Door open when patient unattended (11/20/17 1916)  Total Score: 3 (11/20/17 1916)  Standard Fall Precautions: Yes (11/09/17 1132)  High Fall Risk: Yes (11/20/17 1916)     Speech Assessment:  Aspiration Signs/Symptoms: Delayed cough/throat clear (11/10/17 0910)      Ambulation:  Gait  Distance (ft): 200 Feet (ft) (x2) (11/20/17 1400)  Assistive Device: Walker, rolling (11/20/17 1400)  Rail Use: Both (11/17/17 1200)     Labs/Studies:  Recent Results (from the past 72 hour(s))   GLUCOSE, POC    Collection Time: 11/18/17  7:10 AM   Result Value Ref Range    Glucose (POC) 128 (H) 65 - 100 mg/dL   GLUCOSE, POC    Collection Time: 11/18/17 11:16 AM   Result Value Ref Range    Glucose (POC) 173 (H) 65 - 100 mg/dL   GLUCOSE, POC    Collection Time: 11/18/17  3:57 PM   Result Value Ref Range    Glucose (POC) 125 (H) 65 - 100 mg/dL   GLUCOSE, POC    Collection Time: 11/18/17  8:51 PM   Result Value Ref Range    Glucose (POC) 174 (H) 65 - 100 mg/dL   GLUCOSE, POC    Collection Time: 11/19/17  7:14 AM   Result Value Ref Range    Glucose (POC) 107 (H) 65 - 100 mg/dL   GLUCOSE, POC    Collection Time: 11/19/17 11:12 AM   Result Value Ref Range    Glucose (POC) 216 (H) 65 - 100 mg/dL   GLUCOSE, POC    Collection Time: 11/19/17  4:05 PM   Result Value Ref Range    Glucose (POC) 104 (H) 65 - 100 mg/dL   GLUCOSE, POC    Collection Time: 11/19/17  8:35 PM   Result Value Ref Range    Glucose (POC) 156 (H) 65 - 100 mg/dL   GLUCOSE, POC    Collection Time: 11/20/17  7:36 AM   Result Value Ref Range    Glucose (POC) 192 (H) 65 - 100 mg/dL   GLUCOSE, POC    Collection Time: 11/20/17 11:18 AM   Result Value Ref Range    Glucose (POC) 173 (H) 65 - 100 mg/dL   GLUCOSE, POC    Collection Time: 11/20/17  4:18 PM   Result Value Ref Range    Glucose (POC) 113 (H) 65 - 100 mg/dL   GLUCOSE, POC    Collection Time: 11/20/17  8:44 PM   Result Value Ref Range    Glucose (POC) 114 (H) 65 - 100 mg/dL       Assessment:     Problem List as of 11/21/2017  Date Reviewed: 10/23/2017          Codes Class Noted - Resolved    Stroke (cerebrum) ([facility] ICD-10-CM: I63.9  ICD-9-CM: 434.91  11/9/2017 - Present        Abdominal bloating ICD-10-CM: R14.0  ICD-9-CM: 787.3  10/23/2017 - Present        Nausea ICD-10-CM: R11.0  ICD-9-CM: 787.02  8/15/2017 - Present        Fatty liver ICD-10-CM: K76.0  ICD-9-CM: 571.8  6/28/2017 - Present        Lower abdominal pain ICD-10-CM: R10.30  ICD-9-CM: 789.09  6/19/2017 - Present        Neck pain ICD-10-CM: M54.2  ICD-9-CM: 723.1  12/27/2016 - Present        GERD (gastroesophageal reflux disease) ICD-10-CM: K21.9  ICD-9-CM: 530.81  6/23/2016 - Present        Type 2 diabetes mellitus ([facility] ICD-10-CM: E11.9  ICD-9-CM: 250.00  2/25/2015 - Present        Allergic rhinitis ICD-10-CM: J30.9  ICD-9-CM: 477.9  2/25/2015 - Present        HLD (hyperlipidemia) ICD-10-CM: E78.5  ICD-9-CM: 272.4  2/25/2015 - Present        Enlarged prostate with lower urinary tract symptoms (LUTS) ICD-10-CM: N40.1  ICD-9-CM: 600.01  2/25/2015 - Present        Essential hypertension ICD-10-CM: I10  ICD-9-CM: 401.9  2/25/2015 - Present        Asthma ICD-10-CM: J45.909  ICD-9-CM: 493.90  2/25/2015 - Present        RESOLVED: Delusional disorder ([facility] ICD-10-CM: F22  ICD-9-CM: 297.1  2/25/2015 - 6/28/2017        RESOLVED: Backache, unspecified ICD-10-CM: M54.9  ICD-9-CM: 724.5  2/25/2015 - 10/23/2017        RESOLVED: Urinary tract infection, site not specified ICD-10-CM: N39.0  ICD-9-CM: 599.0  2/25/2015 - 10/23/2017               Pontine Infarct, ischemic          Continue daily physician medical management:  Pneumonia prophylaxis- Incentive spirometer every hour while awake      Ischemic CVA; cont asa and lipitor; permissive htn      DVT risk / DVT Prophylaxis- Will require daily physician exam to assess for signs and symptoms as patient is at increased risk for of thromboembolism. Mobilization as tolerated. Intermittent pneumatic compression devices when in bed Thigh-high or knee-high thromboembolic deterrent hose when out of bed. Add subcut heparin       Pain Control: no current joint or muscle symptoms, essentially pain-free. Will require regular pain assessment and comprenhensive pain management,       Hypertension - BP uncontrolled, fluctuating, managed medically. Continue Lotensin and add prn hydralazine. His SBP upon arrival was >220 and DBP >110   monitor closely, consider adding metoprolol; want permissive htn for cerebral perfusion but goal 120-140s  -11/10 174/86 add norvasc  - 11/11 SBP- 143 this am, continues to improve, on norvasc and lotensin  -11/16 146/74; permissive htn ok  -11/21 159/68 (139-166) DBP 90s; added norvasc 11/10; will increase to 7.5      Diabetes mellitus - Uncontrolled. poor glycemic control. Will require daily, close FSG monitoring and medication adjustment to optimize glycemic control in setting of acute illness and hospitalization. Cont metformin and glucotrol 20mg. Add SSI.  Cont carb restricted diet, accuchecks qac and qhs  -11/10 BS fine  - BG - 121 this am, yesterday 121- 165 range  -11/14 consider changing to bid bs  -11/15 bs of 44 yesterday; 117 this am.; consider dropping glucotrol dose  -11/16 bs 110 this a.m ; no hypoglycemia past 24 hrs  -11/17 -171 acceptable  -11/20 104- 216 fairly acceptable; 11/21 better control      Urinary retention/ neurogenic bladder - schedule voids q6-8 hrs. Check post-void residual as needed; In and Out catheterize if post-void residual is more than 400 cc. Pt with hx of prostate issues . Cont flomax and proscar  11/17 no issues  -11/21 due to freq urin at noc; check scan x 24hrs      bowel program - add colace and prn bowel program      GERD - resume PPI. At times may need additional antacids, Maalox prn. On omeprazole which is nonformulary, thus change to protonix 40mg bid.  -H pylori; cont Biaxin and amoxil and carafate. Will treat with dual abx for total of 2 weeks. Will f/u with GI post dc  - no gi complaints  -11/17 abx dc'd yesterday; cont GI prophlaxis  -11/20 no GERD symptoms      Asthma/allergic rhinitis; cont flonase and zyrtec.       Sleep mgmt-  will continue to increase prn trazodone to 100mg  -11/13 had difficult noc with restlessness, confusion; dc trazadone and try benadryl and restoril; monitor for confusion; could consider remeron  -11/14 a bit better noc; 11/15 cont current plan  -11/17 confirmed with staff that he IS sleeping and that he perseverates on thinking he did not. RN to continue to document (could consider a sleep log)  -11/20; still reports that he isnt sleeping. Will dose his restoril and benedryl together. I am going to make up a sleep log to document hourly on whether he is asleep or not.  -11/21 biggest deterrent to sleep is freq voiding; is on  flomax and proscar; lets scan after spont void and see if he is retaining, thus sensation to void freq. Dc benadryl and restoril. trazadone was not effective as well.   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      Signed By: Brooklyn Reed MD     November 21, 2017

## 2017-11-21 NOTE — PROGRESS NOTES
End Of Shift Functional Summary, Nursing      TOILETING:  Does patient need assist with clothing management and/or pericare? No    TOILET TRANSFER:  Pt requires minimal assistance. Pt uses walker. BLADDER:  Pt does not have a plunkett catheter that staff manages. Pt does take medication. Pt is continent of bladder and voids in toilet. Pt has had 0 bladder accidents during this shift. BOWEL:  Pt does take medication. Pt is continent of bowel and uses toilet. Pt has had 0 bowel accidents during this shift requiring minimal assistance from staff to clean up. BED/CHAIR TRANSFER  Pt requires minimal assistance. Patient requires the assistance of 1 staff member(s). Pt uses walker      EATING  Pt requires no assistance. Pt does not wear dentures. TUBE FEEDINGS:  Pt does not  receive nutrition through tube feedings. Documentation reviewed and plan of care discussed/reviewed with   patient and family/spouse during the shift.

## 2017-11-21 NOTE — PROGRESS NOTES
Problem: Falls - Risk of  Goal: *Absence of Falls  Document Jaiden Fall Risk and appropriate interventions in the flowsheet.    Outcome: Progressing Towards Goal  Fall Risk Interventions:  Mobility Interventions: Patient to call before getting OOB, Utilize walker, cane, or other assitive device    Mentation Interventions: Adequate sleep, hydration, pain control, Door open when patient unattended, More frequent rounding, Toileting rounds, Update white board    Medication Interventions: Patient to call before getting OOB, Teach patient to arise slowly    Elimination Interventions: Call light in reach, Patient to call for help with toileting needs, Toilet paper/wipes in reach, Toileting schedule/hourly rounds, Urinal in reach    History of Falls Interventions: Door open when patient unattended

## 2017-11-21 NOTE — PROGRESS NOTES
PHYSICAL THERAPY DISCHARGE SUMMARY    Time in: 1300  Time out: 1345     Precautions at discharge: Other (comment) (falls)    Problem List:    Decreased strength B LE  [x]     Decreased strength trunk/core  []     Decreased AROM   []     Decreased PROM  []     Decreased balance sitting  []     Decreased balance standing  [x]     Decreased endurance  [x]     Pain  []       Functional Limitations:   Decreased independence with bed mobility  []     Decreased independence with functional transfers  []     Decreased independence with ambulation  []     Decreased independence with stair negotiation  []            Outcome Measures: Vital Signs: No vitals taken this PM.        Pain level: No pain reported. Pain location: n/a  Pain interventions: n/a    Patient education: Educated patient on safety in home environment. Interdisciplinary Communication: Communicated with Debbie Vigil regarding patient's POC.        MMT Initial Asssessment   Right Lower Extremity Left Lower Extremity   Hip Flexion 4- 4+   Knee Extension 4 4+   Knee Flexion 4- 4+   Ankle Dorsiflexion 4- 4+      MMT Discharge Assessment   Right Lower Extremity Left Lower Extremity   Hip Flexion 4 4+   Knee Extension 4 4+   Knee Flexion 4 4+   Ankle Dorsiflexion 4 4+   0/5 No palpable muscle contraction  1/5 Palpable muscle contraction, no joint movement  2-/5 Less than full range of motion in gravity eliminated position  2/5 Able to complete full range of motion in gravity eliminated position  2+/5 Able to initiate movement against gravity  3-/5 More than half but not full range of motion against gravity  3/5 Able to complete full range of motion against gravity  3+/5 Completes full range of motion against gravity with minimal resistance  4-/5 Completes full range of motion against gravity with minimal-moderate resistance  4/5 Completes full range of motion against gravity with moderate resistance  4+/5 Completes full range of motion against gravity with moderate-maximum resistance  5/5 Completes full range of motion against gravity with maximum resistance     AROM: WFL    FIM SCORES Initial Assessment Discharge Assessment   Bed/Chair/Wheelchair Transfers 4 6   Wheelchair Mobility 5 0 (Patient's primary mode of locomotion is ambulation)   Walking [place] 1 6   Steps/Stairs 0 (Secondary to decreased dynamic standing balance.) 6   PRIMARY MODE OF LOCOMOTION: Ambulation  Please see [facility] Interdisciplinary Eval: Coordination/Balance Section for details regarding FIM score description.     BED/CHAIR/WHEELCHAIR TRANSFERS Initial Assessment Discharge Assessment   Rolling Right 0 (Not tested) 6 (Modified independent)   Rolling Left 0 (Not tested) 6 (Modified independent)   Supine to Sit 0 (Not tested) 6 (Modified independent)   Sit to Stand Minimal assistance Modified independent   Sit to Supine 0 (Not tested) 6 (Modified independent)   Transfer Assist Score 4 6   Transfer Type SPT with walker SPT with walker   Comments Lifting and steadying assist. Increased time   Car Transfer Not tested Not tested   Car Type           WHEELCHAIR MOBILITY/MANAGEMENT Initial Assessment Discharge Assessment   Able to Propel 165 feet     Functional Level 5 0 (Patient's primary mode of locomotion is ambulation)   Curbs/ramps assistance required 0 (Not tested)     Wheelchair set up assistance required 0 (Not tested)     Wheelchair management Manages left brake, Manages right brake         [employer] INDEPENDENCE Initial Assessment Discharge Assessment   Assistive device Walker, rolling Walker, rolling   Ambulation assistance - level surface 3 (Moderate assistance) 6 (Modified independent)   Distance 20 Feet (ft) 250 Feet (ft) (x1, 150' x1)   Functional Level 1 6   Comments Patient ambulated with decreased step length B LE, increased postural sway, altered center of gravity Intermittent decreased step clearance R LE and foot flat at IC of R LE   Ambulation assistance - unlevel surface 0 (Not tested) 6 (Modified independent)       STEPS/STAIRS Initial Assessment Discharge Assessment   Steps/Stairs ambulated 0 12   Rail Use Both Both   Functional Level 0 (Secondary to decreased dynamic standing balance.) 6   Comments Patient ascends/descends stairs using reciprocal pattern. Patient ascends/descends stairs using reciprocal pattern. Curbs/Ramps 6 (Modified independent) 6 (Modified independent)       QUALITY INDICATOR ASSIST COMMENTS   Walk 10 feet MOD I    Walk 50 feet with 2 turns MOD I    Walk 150 feet MOD I    Walk 10 feet on uneven  MOD I    1 step/curb MOD I    4 steps MOD I    12 steps MOD I     object MOD I    Wheel 50' w/2 turns NT Patient's primary mode of locomotion is ambulation   Wheel 150' NT Patient's primary mode of locomotion is ambulation     Patient returned to room sitting in recliner with all needs in reach. PHYSICAL THERAPY PLAN OF CARE    LTGs: For updated LTG's plase see Care Plan. Pt would benefit from continued skilled physical therapy in order to improve independent functional mobility within the home with use of least restrictive device. Interventions may include range of motion (AROM, PROM B LE/trunk), motor function (B LE/trunk strengthening/coordination), activity tolerance (vitals, oxygen saturation levels), bed mobility training, balance activities, gait training (progressive ambulation program), and functional transfer training. HEP handout: Given to patient 11/21/17         Therapy Recommendations upon discharge: Outpatient PT   Equipment needs at discharge:     Rolling Walker    Please see [id number] [facility]; Interdisciplinary Eval, Care Plan, and Patient Education for further information regarding physical therapy discharge summary and plan of care.      Emeterio Rojas  11/21/2017

## 2017-11-21 NOTE — PROGRESS NOTES
Subjective \"I am ready to get home so I can get some rest.\"   Activity Standing basketball toss with the use of BUE   Strength/Endurance Patient tolerated standing and tossing the bean bags with rest breaks needed after every round. He commented that his RUE was fatigued from the throwing movement. He was able to get the bean bags up to the goal with his RUE once it was moved closer to him. Balance Sit<->stand:  S with RW   Social Interaction Patient was quiet and appeared down during the session. It was apparent that he is ready to be home and to get rest.  He commented that he \"didn't feel well because of lack of sleep. \"   Cognitive A&O X4   Comments Patient was assisted to his room and into the recliner chair with all needs within reach. Pt D/C summary completed on 11/21/17. Please see for specifics in [address] Patient expected to be d/c'd to home on 11/22/17.   Kolby Foley, CTRS

## 2017-11-21 NOTE — PROGRESS NOTES
11/21/17 1015   Time Spent With Patient   Time In 0745   Time Out 0887   Patient Seen For: AM;ADLs   Grooming   Grooming Assistance  SBA   Comments stood at sink to brush teeth for ~3 minutes, good placement of RW. Upper Body Bathing   Bathing Assistance, Upper I   Upper Body  Training to use affected extremity as a fine motor assistance   Position Performed Seated in chair   Adaptive Equipment Tub bench   Lower Body Bathing   Bathing Assistance, Lower  Mod I   Adaptive Equipment Grab bar   Position Performed Seated in chair;Standing   Adaptive Equipment Tub bench   Comments good safety awareness during shower   Toileting   Toileting Assistance (FIM Score) S   Upper Body Dressing    Dressing Assistance  S   Comments s/u assist   Lower Body Dressing    Dressing Assistance  S   Leg Crossed Method Used No   Position Performed Seated in chair;Standing   Comments good balance in stance, good ROM to complete LB dressing. s/u assist   Functional Transfers   Tub or Shower Type Shower   Amount of Assistance Required SBA   Adaptive Equipment Grab bars; Tub transfer bench;Walker (comment)     S: \"I am doing good. \" Agreeable to therapy. Focus of session was on morning ADL routine. Patient was able to ambulate ~25 feet using a RW with supervision. Pain denied during session. Collaborated with PT and confirmed patient is on track to reach goals as documented in the care plan. Patient tolerated session well. Patient ended session in recliner with call remote and phone within reach.      Sarah Hernandes OTR

## 2017-11-21 NOTE — PROGRESS NOTES
PHYSICAL THERAPY DAILY NOTE  Time In: 0913  Time Out: 1000  Patient Seen For: AM;Equipment assessment;Gait training;Patient education; Therapeutic exercise;Transfer training    Subjective: \"I'm ready to go home so I can get some good sleep. \" Patient agreeable to therapy. Objective: Vital Signs:   Patient Vitals for the past 8 hrs:   Temp Pulse Resp BP SpO2   11/21/17 0720 98.3 °F (36.8 °C) 66 16 139/79 98 %          Pain level: No pain reported. Pain location: n/a  Pain interventions: n/a    Patient education: Educated patient and patient's spouse on safety in home environment and ascend/descend one step with use of RW. Interdisciplinary Communication: Communicated with Marialuisa Flor regarding patient's POC. Other (comment) (Fall risk)  GROSS ASSESSMENT Daily Assessment            BED/MAT MOBILITY Daily Assessment            TRANSFERS Daily Assessment   Required for safety. Transfer Type: SPT with walker  Transfer Assistance : 5 (Supervision/setup)  Sit to Stand Assistance: Supervision  Car Transfers: Not tested       GAIT Daily Assessment   Patient ambulated with upright posture, intermittent foot flat at IC of R LE- verbal cues for heel strike improved gait sequence. Amount of Assistance: 5 (Stand-by assistance)  Distance (ft): 200 Feet (ft) (x3)  Assistive Device: Walker, rolling       STEPS or STAIRS Daily Assessment   Patient ascended one step with use of RW leading with L LE, and descended one step with use of RW leading with R LE.  Steps/Stairs Ambulated (#): 1  Level of Assist : 5 (Stand-by assistance)  Rail Use: None       BALANCE Daily Assessment    Sitting - Static: Good (unsupported)  Sitting - Dynamic: Good (unsupported)  Standing - Static: Good  Standing - Dynamic : Impaired       WHEELCHAIR MOBILITY Daily Assessment            LOWER EXTREMITY EXERCISES Daily Assessment    Extremity: Both  Exercise Type #1: Supine lower extremity strengthening  Sets Performed: 1  Reps Performed: 15  Level of Assist: Supervision     Patent handed off to RT Emil in therapy gym. Assessment: Treatment session was used for family training with patient's spouse. Patient and spouse demonstrated good understanding of HEP and benefits of continued therapy upon discharge from [facility]. Plan of Care: Continue with POC and progress as tolerated.        Viet Estevez  11/21/2017

## 2017-11-22 VITALS
DIASTOLIC BLOOD PRESSURE: 80 MMHG | TEMPERATURE: 98.3 F | BODY MASS INDEX: 25.28 KG/M2 | SYSTOLIC BLOOD PRESSURE: 181 MMHG | HEART RATE: 71 BPM | HEIGHT: 74 IN | WEIGHT: 197 LBS | RESPIRATION RATE: 14 BRPM | OXYGEN SATURATION: 96 %

## 2017-11-22 LAB
GLUCOSE BLD STRIP.AUTO-MCNC: 116 MG/DL (ref 65–100)
GLUCOSE BLD STRIP.AUTO-MCNC: 178 MG/DL (ref 65–100)

## 2017-11-22 PROCEDURE — 97110 THERAPEUTIC EXERCISES: CPT

## 2017-11-22 PROCEDURE — 51798 US URINE CAPACITY MEASURE: CPT

## 2017-11-22 PROCEDURE — 97535 SELF CARE MNGMENT TRAINING: CPT

## 2017-11-22 PROCEDURE — 74011250637 HC RX REV CODE- 250/637: Performed by: PHYSICAL MEDICINE & REHABILITATION

## 2017-11-22 PROCEDURE — 82962 GLUCOSE BLOOD TEST: CPT

## 2017-11-22 PROCEDURE — 99239 HOSP IP/OBS DSCHRG MGMT >30: CPT | Performed by: PHYSICAL MEDICINE & REHABILITATION

## 2017-11-22 PROCEDURE — 97116 GAIT TRAINING THERAPY: CPT

## 2017-11-22 RX ADMIN — TAMSULOSIN HYDROCHLORIDE 0.4 MG: 0.4 CAPSULE ORAL at 08:27

## 2017-11-22 RX ADMIN — GLIPIZIDE 20 MG: 5 TABLET ORAL at 08:26

## 2017-11-22 RX ADMIN — METFORMIN HYDROCHLORIDE 500 MG: 500 TABLET, FILM COATED ORAL at 08:27

## 2017-11-22 RX ADMIN — PANTOPRAZOLE SODIUM 40 MG: 40 TABLET, DELAYED RELEASE ORAL at 05:07

## 2017-11-22 RX ADMIN — FINASTERIDE 5 MG: 5 TABLET, FILM COATED ORAL at 08:27

## 2017-11-22 RX ADMIN — ASPIRIN 81 MG CHEWABLE TABLET 81 MG: 81 TABLET CHEWABLE at 08:27

## 2017-11-22 RX ADMIN — DOCUSATE SODIUM 100 MG: 100 CAPSULE, LIQUID FILLED ORAL at 08:27

## 2017-11-22 RX ADMIN — AMLODIPINE BESYLATE 5 MG: 5 TABLET ORAL at 08:27

## 2017-11-22 RX ADMIN — FLUTICASONE PROPIONATE 2 SPRAY: 50 SPRAY, METERED NASAL at 08:28

## 2017-11-22 RX ADMIN — ESCITALOPRAM OXALATE 10 MG: 10 TABLET ORAL at 08:26

## 2017-11-22 RX ADMIN — ATORVASTATIN CALCIUM 80 MG: 40 TABLET, FILM COATED ORAL at 08:26

## 2017-11-22 RX ADMIN — SUCRALFATE 1 G: 1 TABLET ORAL at 05:07

## 2017-11-22 RX ADMIN — HYDROCHLOROTHIAZIDE: 12.5 CAPSULE ORAL at 08:26

## 2017-11-22 NOTE — PROGRESS NOTES
Care Management Interventions  PCP Verified by CM: Yes  Mode of Transport at Discharge: Other (see comment) (family car)  Transition of Care Consult (CM Consult):  (outpatieint PT and OT)  Discharge Durable Medical Equipment: Yes (Rolling walker from [employer])  Physical Therapy Consult: Yes  Occupational Therapy Consult: Yes  Speech Therapy Consult: Yes  Current Support Network: Lives with Spouse, Own Home, Family Lives Nearby  Confirm Follow Up Transport: Family  Plan discussed with Pt/Family/Caregiver: Yes  Freedom of Choice Offered: Yes  Discharge Location  Discharge Placement: Home with outpatient services (@ [facility])    Pt discharged to home with spouse and outpatient therapy.   RW delivered to patient in hospital.

## 2017-11-22 NOTE — PROGRESS NOTES
OT DISCHARGE REPORT    Time In: 2224  Time Out: 0815    COMPREHENSION MODE Initial Assessment Discharge Assessment 11/22/2017   Score 7 7     EXPRESSION Initial Assessment Discharge Assessment 11/22/2017   Primary Mode of Expression Verbal Verbal   Score 5 7   Comments some difficulty with articulation; had to repeat some words        SOCIAL INTERACTION/ PRAGMATICS Initial Assessment Discharge Assessment 11/22/2017   Score 7 7   Comments very pleasant        PROBLEM SOLVING Initial Assessment Discharge Assessment 11/22/2017   Score 6 7   Comments         MEMORY Initial Assessment Discharge Assessment 11/22/2017   Score 5 6   Comments difficulty recalling people frequently seen; recalled 3/3 words       EATING Initial Assessment Discharge Assessment 11/22/2017   Functional Level 5 7   Comments verbal cues to use RUE; setup assist to open packages        GROOMING Initial Assessment Discharge Assessment 11/22/2017   Functional Level 5 6   Tasks completed by patient Washed face, Washed hands Washed face; Washed hands;Brushed teeth   Comments setup        BATHING Initial Assessment Discharge Assessment 11/22/2017   Functional Level 4 6   Body parts patient bathed Abdomen, Arm, left, Arm, right, Buttocks, Chest, Lower leg and foot, left, Mena area, Lower leg and foot, right, Thigh, left, Thigh, right Arm, left; Abdomen;Arm, right;Buttocks; Chest;Lower leg and foot, left; Lower leg and foot, right;Mena area; Thigh, left; Thigh, right   Comments CGA when standing for mena care        TUB/SHOWER TRANSFER INDEPENDENCE Initial Assessment Discharge Assessment 11/22/2017   Score 4 6  Type of Shower: Shower  Adaptive  Equipment:Tub transfer bench, Grab bars and Walker   Comments CGA with RW        UPPER BODY DRESSING/UNDRESSING Initial Assessment Discharge Assessment 11/22/2017   Functional Level 5 6   Items applied/Steps completed Pullover (4 steps) Pullover (4 steps)   Comments setup assist; able to identify and correct problem of putting RUE through head hole        LOWER BODY DRESSING/UNDRESSING Initial Assessment Discharge Assessment 11/22/2017   Functional Level 4 6   Items applied/Steps completed Elastic waist pants (3 steps), Sock, left (1 step), Sock, right (1 step), Underpants (3 steps) Elastic waist pants (3 steps); Sock, left (1 step); Sock, right (1 step); Underpants (3 steps); Fasten shoe (1 step); Shoe, left (1 step); Shoe, right (1 step)   Comments CGA when standing for clothing management up        TOILETING Initial Assessment Discharge Assessment 11/22/2017   Functional Level 5 6   Comments SBA       TOILET TRANSFER INDEPENDENCE Initial Assessment Discharge Assessment 11/22/2017   Transfer score 4 6   Comments CGA with RW        Plan of Care: Please see Care Plan for progress towards goals during stay  Precautions at Discharge: Falls  Discharge Location: home with supervision/assistance from wife   Safety/Supervision Recommendations/Functional Level:    Family training completed 11/21/17 with wife. Recommend assistance with complex IADL tasks, completing shower seated, non-skid mat in shower, nightlights for safety, no driving until cleared by physician. Family Training: Completed 11/21/17 with wife. Recommended Continuing Therapy: Outpatient OT  Residual Deficits: impaired upright posture in standing, impaired dynamic balance   Progress over LOS: Patient made excellent progress with RUE fine and gross motor coordination, strength, activity tolerance, ADL skills, functional transfers and mobility, positioning, AD management, and safety awareness. Patient has met all goals established on initial evaluation. Summary of Session: Patient seated up in recliner on arrival to room. Agreeable to OT. Completed ADL; see above for FIMs. Patient was offered opportunity to ask questions and did not have any further questions as related to therapy, progress, or discharge.  Patient tolerated session well with no complaint of pain and was left seated up in recliner with call light/all needs in reach. Recommend follow-up OT at discharge to continue to address deficits in coordination, balance, safety awareness, and AD management.      French Mayo MS, OTR/L  11/22/2017

## 2017-11-22 NOTE — PROGRESS NOTES
Problem: Falls - Risk of  Goal: *Absence of Falls  Document Jaiden Fall Risk and appropriate interventions in the flowsheet.    Outcome: Progressing Towards Goal  Fall Risk Interventions:  Mobility Interventions: Patient to call before getting OOB    Mentation Interventions: Adequate sleep, hydration, pain control    Medication Interventions: Patient to call before getting OOB, Teach patient to arise slowly    Elimination Interventions: Call light in reach, Patient to call for help with toileting needs, Toilet paper/wipes in reach, Toileting schedule/hourly rounds, Urinal in reach    History of Falls Interventions: Door open when patient unattended

## 2017-11-22 NOTE — PROGRESS NOTES
RN reviewed discharge paperwork with patient and pt wife. RN answered any questions they had. Pt and wife acknowledged that they understood the discharge instructions and were aware of appointments that were made. Pt take downstairs by Francis High CNA and [employer] [employer]. Pt discharged to home.

## 2017-11-22 NOTE — PROGRESS NOTES
PHYSICAL THERAPY DAILY NOTE  Time In: 4930  Time Out: 0901  Patient Seen For: AM;Gait training;Patient education; Therapeutic exercise    Subjective: \"I'm looking forward to going home. \" Patient agreeable to therapy. Objective: Vital Signs:   Patient Vitals for the past 8 hrs:   Temp Pulse Resp BP SpO2   11/22/17 0735 98.3 °F (36.8 °C) 71 14 181/80 96 %         Pain level: No pain reported. Pain location: n/a  Pain interventions: n/a    Patient education: Educated patient on safety in home environment. Interdisciplinary Communication: Communicated with Lianna Dc regarding patient's POC. Other (comment) (Fall risk)  GROSS ASSESSMENT Daily Assessment            BED/MAT MOBILITY Daily Assessment   Assessed on 11/21/17. TRANSFERS Daily Assessment   Required for safety. Transfer Type: SPT with walker  Transfer Assistance : 6 (Modified independent)  Sit to Stand Assistance: Modified independent       GAIT Daily Assessment   Patient also ambulated 20 ft x 3 through blue bolsters in slalom style to practice turning with RW. Amount of Assistance: 6 (Modified independent)  Distance (ft): 200 Feet (ft) (x2)  Assistive Device: Walker, rolling       STEPS or STAIRS Daily Assessment   Assessed on 11/21/17. BALANCE Daily Assessment            WHEELCHAIR MOBILITY Daily Assessment            LOWER EXTREMITY EXERCISES Daily Assessment    Extremity: Both  Exercise Type #1: Other (comment) (Nustep x 10 minutes, Level 3)     Patient returned to room sitting in recliner with all needs in reach. Assessment: Patient ready for discharge from [facility]. Patient MOD I with HEP. Plan of Care: Patient to begin outpatient PT following discharge from [facility].     John Cobb  11/22/2017

## 2017-11-22 NOTE — DISCHARGE INSTRUCTIONS
DISCHARGE SUMMARY from Nurse    PATIENT INSTRUCTIONS:    What to do at Home:  Recommended activity: Follow activity recommendations of physical and occupational therapy. If you experience any of the following symptoms:   · Excessive pain, swelling, redness or odor of or around the surgical area  · Temperature over 100.5  · Nausea and vomiting lasting longer than 4 hours or if unable to take medications  · Any signs of decreased circulation or nerve impairment to extremity: change in color, persistent  numbness, tingling, coldness or increase pain  · Any questions   Please follow up with your primary care provider. *  Please give a list of your current medications to your Primary Care Provider. *  Please update this list whenever your medications are discontinued, doses are      changed, or new medications (including over-the-counter products) are added. *  Please carry medication information at all times in case of emergency situations. These are general instructions for a healthy lifestyle:    No smoking/ No tobacco products/ Avoid exposure to second hand smoke  Surgeon General's Warning:  Quitting smoking now greatly reduces serious risk to your health. Obesity, smoking, and sedentary lifestyle greatly increases your risk for illness    A healthy diet, regular physical exercise & weight monitoring are important for maintaining a healthy lifestyle    You may be retaining fluid if you have a history of heart failure or if you experience any of the following symptoms:  Weight gain of 3 pounds or more overnight or 5 pounds in a week, increased swelling in our hands or feet or shortness of breath while lying flat in bed. Please call your doctor as soon as you notice any of these symptoms; do not wait until your next office visit.     Recognize signs and symptoms of STROKE:    F-face looks uneven    A-arms unable to move or move unevenly    S-speech slurred or non-existent    T-time-call 911 as soon as signs and symptoms begin-DO NOT go       Back to bed or wait to see if you get better-TIME IS BRAIN. Warning Signs of HEART ATTACK     Call 911 if you have these symptoms:   Chest discomfort. Most heart attacks involve discomfort in the center of the chest that lasts more than a few minutes, or that goes away and comes back. It can feel like uncomfortable pressure, squeezing, fullness, or pain.  Discomfort in other areas of the upper body. Symptoms can include pain or discomfort in one or both arms, the back, neck, jaw, or stomach.  Shortness of breath with or without chest discomfort.  Other signs may include breaking out in a cold sweat, nausea, or lightheadedness. Don't wait more than five minutes to call 911 - MINUTES MATTER! Fast action can save your life. Calling 911 is almost always the fastest way to get lifesaving treatment. Emergency Medical Services staff can begin treatment when they arrive -- up to an hour sooner than if someone gets to the hospital by car. The discharge information has been reviewed with the patient. The patient verbalized understanding. Discharge medications reviewed with the patient and appropriate educational materials and side effects teaching were provided.   ___________________________________________________________________________________________________________________________________

## 2017-11-22 NOTE — PROGRESS NOTES
Problem: Falls - Risk of  Goal: *Absence of Falls  Document Jaiden Fall Risk and appropriate interventions in the flowsheet.    Fall Risk Interventions:  Mobility Interventions: Patient to call before getting OOB    Mentation Interventions: Adequate sleep, hydration, pain control    Medication Interventions: Patient to call before getting OOB    Elimination Interventions: Call light in reach    History of Falls Interventions: Door open when patient unattended

## 2017-12-21 PROBLEM — I63.9 STROKE (CEREBRUM) (HCC): Status: RESOLVED | Noted: 2017-11-09 | Resolved: 2017-12-21

## 2017-12-21 PROBLEM — I77.819 DILATION OF DESCENDING AORTA (HCC): Status: ACTIVE | Noted: 2017-12-21

## 2017-12-21 PROBLEM — I69.30 HISTORY OF STROKE WITH RESIDUAL DEFICIT: Status: ACTIVE | Noted: 2017-12-21

## 2017-12-21 PROBLEM — R60.9 DEPENDENT EDEMA: Status: ACTIVE | Noted: 2017-12-21

## 2017-12-21 PROBLEM — R10.30 LOWER ABDOMINAL PAIN: Status: RESOLVED | Noted: 2017-06-19 | Resolved: 2017-12-21

## 2017-12-21 PROBLEM — R14.0 ABDOMINAL BLOATING: Status: RESOLVED | Noted: 2017-10-23 | Resolved: 2017-12-21

## 2017-12-21 PROBLEM — R11.0 NAUSEA: Status: RESOLVED | Noted: 2017-08-15 | Resolved: 2017-12-21

## 2018-03-21 PROBLEM — R10.13 DYSPEPSIA: Status: ACTIVE | Noted: 2018-03-21

## 2018-06-21 PROBLEM — F41.9 ANXIETY DISORDER: Status: ACTIVE | Noted: 2018-06-21

## 2018-06-21 PROBLEM — I77.819 DILATION OF DESCENDING AORTA (HCC): Status: RESOLVED | Noted: 2017-12-21 | Resolved: 2018-06-21

## 2019-06-17 PROBLEM — L72.3 SEBACEOUS CYST: Status: ACTIVE | Noted: 2019-06-17

## 2019-06-17 PROBLEM — E66.3 OVERWEIGHT (BMI 25.0-29.9): Status: ACTIVE | Noted: 2019-06-17

## 2020-09-17 PROBLEM — E66.3 OVERWEIGHT (BMI 25.0-29.9): Status: RESOLVED | Noted: 2019-06-17 | Resolved: 2020-09-17

## 2021-06-25 PROBLEM — L72.3 SEBACEOUS CYST: Status: RESOLVED | Noted: 2019-06-17 | Resolved: 2021-06-25

## 2021-12-29 PROBLEM — I73.9 PERIPHERAL VASCULAR DISEASE (HCC): Status: ACTIVE | Noted: 2021-12-29

## 2022-03-19 PROBLEM — R10.13 DYSPEPSIA: Status: ACTIVE | Noted: 2018-03-21

## 2022-03-19 PROBLEM — R60.9 DEPENDENT EDEMA: Status: ACTIVE | Noted: 2017-12-21

## 2022-03-19 PROBLEM — F41.9 ANXIETY DISORDER: Status: ACTIVE | Noted: 2018-06-21

## 2022-03-19 PROBLEM — I73.9 PERIPHERAL VASCULAR DISEASE (HCC): Status: ACTIVE | Noted: 2021-12-29

## 2022-03-20 PROBLEM — I69.30 HISTORY OF STROKE WITH RESIDUAL DEFICIT: Status: ACTIVE | Noted: 2017-12-21

## 2022-03-20 PROBLEM — K76.0 FATTY LIVER: Status: ACTIVE | Noted: 2017-06-28

## 2022-06-07 RX ORDER — ESCITALOPRAM OXALATE 10 MG/1
TABLET ORAL
Qty: 90 TABLET | Refills: 3 | Status: SHIPPED | OUTPATIENT
Start: 2022-06-07

## 2022-06-23 ENCOUNTER — OFFICE VISIT (OUTPATIENT)
Dept: INTERNAL MEDICINE CLINIC | Facility: CLINIC | Age: 87
End: 2022-06-23
Payer: MEDICARE

## 2022-06-23 VITALS
BODY MASS INDEX: 23.08 KG/M2 | TEMPERATURE: 97.3 F | DIASTOLIC BLOOD PRESSURE: 62 MMHG | HEART RATE: 67 BPM | WEIGHT: 179.8 LBS | SYSTOLIC BLOOD PRESSURE: 126 MMHG | RESPIRATION RATE: 16 BRPM | HEIGHT: 74 IN

## 2022-06-23 DIAGNOSIS — E11.9 CONTROLLED TYPE 2 DIABETES MELLITUS WITHOUT COMPLICATION, WITHOUT LONG-TERM CURRENT USE OF INSULIN (HCC): Primary | ICD-10-CM

## 2022-06-23 DIAGNOSIS — I73.9 PERIPHERAL VASCULAR DISEASE, UNSPECIFIED (HCC): ICD-10-CM

## 2022-06-23 DIAGNOSIS — N40.1 BENIGN PROSTATIC HYPERPLASIA WITH LOWER URINARY TRACT SYMPTOMS, SYMPTOM DETAILS UNSPECIFIED: ICD-10-CM

## 2022-06-23 DIAGNOSIS — F32.1 MAJOR DEPRESSIVE DISORDER, SINGLE EPISODE, MODERATE (HCC): ICD-10-CM

## 2022-06-23 DIAGNOSIS — I10 ESSENTIAL HYPERTENSION: ICD-10-CM

## 2022-06-23 DIAGNOSIS — K21.9 GASTROESOPHAGEAL REFLUX DISEASE WITHOUT ESOPHAGITIS: ICD-10-CM

## 2022-06-23 DIAGNOSIS — E11.69 TYPE 2 DIABETES MELLITUS WITH OTHER SPECIFIED COMPLICATION, WITHOUT LONG-TERM CURRENT USE OF INSULIN (HCC): ICD-10-CM

## 2022-06-23 DIAGNOSIS — R53.82 CHRONIC FATIGUE: ICD-10-CM

## 2022-06-23 DIAGNOSIS — Z91.81 AT HIGH RISK FOR FALLS: ICD-10-CM

## 2022-06-23 LAB
ALBUMIN SERPL-MCNC: 3.7 G/DL (ref 3.2–4.6)
ALBUMIN/GLOB SERPL: 1.2 {RATIO} (ref 1.2–3.5)
ALP SERPL-CCNC: 53 U/L (ref 50–136)
ALT SERPL-CCNC: 25 U/L (ref 12–65)
ANION GAP SERPL CALC-SCNC: 6 MMOL/L (ref 7–16)
AST SERPL-CCNC: 12 U/L (ref 15–37)
BASOPHILS # BLD: 0 K/UL (ref 0–0.2)
BASOPHILS NFR BLD: 1 % (ref 0–2)
BILIRUB SERPL-MCNC: 0.6 MG/DL (ref 0.2–1.1)
BUN SERPL-MCNC: 13 MG/DL (ref 8–23)
CALCIUM SERPL-MCNC: 9.5 MG/DL (ref 8.3–10.4)
CHLORIDE SERPL-SCNC: 100 MMOL/L (ref 98–107)
CHOLEST SERPL-MCNC: 115 MG/DL
CO2 SERPL-SCNC: 30 MMOL/L (ref 21–32)
CREAT SERPL-MCNC: 1.1 MG/DL (ref 0.8–1.5)
DIFFERENTIAL METHOD BLD: ABNORMAL
EOSINOPHIL # BLD: 0.3 K/UL (ref 0–0.8)
EOSINOPHIL NFR BLD: 5 % (ref 0.5–7.8)
ERYTHROCYTE [DISTWIDTH] IN BLOOD BY AUTOMATED COUNT: 12.6 % (ref 11.9–14.6)
EST. AVERAGE GLUCOSE BLD GHB EST-MCNC: 140 MG/DL
GLOBULIN SER CALC-MCNC: 3.1 G/DL (ref 2.3–3.5)
GLUCOSE SERPL-MCNC: 107 MG/DL (ref 65–100)
HBA1C MFR BLD: 6.5 % (ref 4.2–6.3)
HCT VFR BLD AUTO: 38.7 % (ref 41.1–50.3)
HDLC SERPL-MCNC: 58 MG/DL (ref 40–60)
HDLC SERPL: 2 {RATIO}
HGB BLD-MCNC: 12.4 G/DL (ref 13.6–17.2)
IMM GRANULOCYTES # BLD AUTO: 0 K/UL (ref 0–0.5)
IMM GRANULOCYTES NFR BLD AUTO: 0 % (ref 0–5)
LDLC SERPL CALC-MCNC: 46.6 MG/DL
LYMPHOCYTES # BLD: 1.2 K/UL (ref 0.5–4.6)
LYMPHOCYTES NFR BLD: 22 % (ref 13–44)
MCH RBC QN AUTO: 26.7 PG (ref 26.1–32.9)
MCHC RBC AUTO-ENTMCNC: 32 G/DL (ref 31.4–35)
MCV RBC AUTO: 83.2 FL (ref 79.6–97.8)
MONOCYTES # BLD: 0.6 K/UL (ref 0.1–1.3)
MONOCYTES NFR BLD: 10 % (ref 4–12)
NEUTS SEG # BLD: 3.5 K/UL (ref 1.7–8.2)
NEUTS SEG NFR BLD: 62 % (ref 43–78)
NRBC # BLD: 0 K/UL (ref 0–0.2)
PLATELET # BLD AUTO: 198 K/UL (ref 150–450)
PMV BLD AUTO: 11.1 FL (ref 9.4–12.3)
POTASSIUM SERPL-SCNC: 4 MMOL/L (ref 3.5–5.1)
PROT SERPL-MCNC: 6.8 G/DL (ref 6.3–8.2)
RBC # BLD AUTO: 4.65 M/UL (ref 4.23–5.6)
SODIUM SERPL-SCNC: 136 MMOL/L (ref 136–145)
TRIGL SERPL-MCNC: 52 MG/DL (ref 35–150)
VLDLC SERPL CALC-MCNC: 10.4 MG/DL (ref 6–23)
WBC # BLD AUTO: 5.6 K/UL (ref 4.3–11.1)

## 2022-06-23 PROCEDURE — 99214 OFFICE O/P EST MOD 30 MIN: CPT | Performed by: INTERNAL MEDICINE

## 2022-06-23 PROCEDURE — 1036F TOBACCO NON-USER: CPT | Performed by: INTERNAL MEDICINE

## 2022-06-23 PROCEDURE — G8427 DOCREV CUR MEDS BY ELIG CLIN: HCPCS | Performed by: INTERNAL MEDICINE

## 2022-06-23 PROCEDURE — G8420 CALC BMI NORM PARAMETERS: HCPCS | Performed by: INTERNAL MEDICINE

## 2022-06-23 PROCEDURE — 1123F ACP DISCUSS/DSCN MKR DOCD: CPT | Performed by: INTERNAL MEDICINE

## 2022-06-23 SDOH — ECONOMIC STABILITY: FOOD INSECURITY: WITHIN THE PAST 12 MONTHS, YOU WORRIED THAT YOUR FOOD WOULD RUN OUT BEFORE YOU GOT MONEY TO BUY MORE.: NEVER TRUE

## 2022-06-23 SDOH — ECONOMIC STABILITY: FOOD INSECURITY: WITHIN THE PAST 12 MONTHS, THE FOOD YOU BOUGHT JUST DIDN'T LAST AND YOU DIDN'T HAVE MONEY TO GET MORE.: NEVER TRUE

## 2022-06-23 ASSESSMENT — PATIENT HEALTH QUESTIONNAIRE - PHQ9
SUM OF ALL RESPONSES TO PHQ QUESTIONS 1-9: 0
SUM OF ALL RESPONSES TO PHQ QUESTIONS 1-9: 0
SUM OF ALL RESPONSES TO PHQ9 QUESTIONS 1 & 2: 0
SUM OF ALL RESPONSES TO PHQ QUESTIONS 1-9: 0
SUM OF ALL RESPONSES TO PHQ QUESTIONS 1-9: 0
2. FEELING DOWN, DEPRESSED OR HOPELESS: 0
1. LITTLE INTEREST OR PLEASURE IN DOING THINGS: 0

## 2022-06-23 ASSESSMENT — SOCIAL DETERMINANTS OF HEALTH (SDOH): HOW HARD IS IT FOR YOU TO PAY FOR THE VERY BASICS LIKE FOOD, HOUSING, MEDICAL CARE, AND HEATING?: NOT HARD AT ALL

## 2022-06-23 NOTE — PROGRESS NOTES
6/23/2022 11:41 AM  Location:[facility] [address] [facility]  [place]  Patient #:  [id number]  YOB: 1935          YOUR LAST HEMOGLOBIN A1CS:   No results found for: HBA1C, [id number]    YOUR LAST LIPID PROFILE:   Lab Results   Component Value Date    CHOL 135 12/28/2020    HDL 57 12/28/2020    VLDL 12 12/28/2020         Lab Results   Component Value Date    GFRAA 83 12/29/2021    BUN 22 12/29/2021     12/29/2021    K 4.0 12/29/2021     12/29/2021    CO2 27 12/29/2021           History of Present Illness     Chief Complaint   Patient presents with    Follow-up Chronic Condition     6 month follow-up    Diabetes    Fall     lost his balance and fell; skinned up his knee and right hand; happened last sat     Patient states that he lost his balance when he was walking outdoors. He denied loss of consciousness. He was using his 4 post cane at the time but does have a walker at home which she has not been using.   Mr. Jenna Cornejo is a 81 y.o. male  who presents for office visit      No Known Allergies  Past Medical History:   Diagnosis Date    Abnormal weight gain     Acute bronchitis     Acute sinusitis, unspecified     Allergic rhinitis, cause unspecified 2/25/2015    Backache, unspecified 2/25/2015    Bladder neck obstruction     Carpal tunnel syndrome     Conjunctivitis unspecified     Cough     Delusional disorder(297.1) 2/25/2015    Dermatophytosis of nail     Dizziness and giddiness     Dyspepsia and other specified disorders of function of stomach     GERD (gastroesophageal reflux disease)     Hemorrhage of rectum and anus     Hypertrophy of prostate with urinary obstruction and other lower urinary tract symptoms (LUTS) 2/25/2015    Impotence of organic origin     Lipoma of unspecified site     Nausea alone     Nocturia     Nonspecific abnormal finding in stool contents     Other and unspecified hyperlipidemia 2/25/2015    Other malaise and Family: Not on file    Attends Cheondoism Services: Not on file    Active Member of Clubs or Organizations: Not on file    Attends Club or Organization Meetings: Not on file    Marital Status: Not on file   Intimate Partner Violence:     Fear of Current or Ex-Partner: Not on file    Emotionally Abused: Not on file    Physically Abused: Not on file    Sexually Abused: Not on file   Housing Stability:     Unable to Pay for Housing in the Last Year: Not on file    Number of [place] in the Last Year: Not on file    Unstable Housing in the Last Year: Not on file     Past Surgical History:   Procedure Laterality Date    HEMORRHOID SURGERY      OTHER SURGICAL HISTORY      Keloid Surgery     OTHER SURGICAL HISTORY      Hydrocele     Current Outpatient Medications   Medication Sig Dispense Refill    escitalopram (LEXAPRO) 10 MG tablet Take 1 tablet by mouth once daily 90 tablet 3    Lancets MISC Check once daily or as directed. E11.9      amLODIPine (NORVASC) 5 MG tablet Take 1 tablet by mouth once daily      ascorbic acid (VITAMIN C) 500 MG tablet Take 500 mg by mouth daily      aspirin 81 MG chewable tablet Take 81 mg by mouth daily      atorvastatin (LIPITOR) 80 MG tablet TAKE 1 TABLET BY MOUTH ONCE DAILY Indications: primary prevention of heart attack      cetirizine (ZYRTEC) 10 MG tablet Take by mouth daily      cyanocobalamin 1000 MCG tablet Take 1,000 mcg by mouth daily      lisinopril-hydroCHLOROthiazide (PRINZIDE;ZESTORETIC) 10-12.5 MG per tablet TAKE 1 TABLET BY MOUTH ONCE DAILY      metFORMIN (GLUCOPHAGE) 500 MG tablet TAKE ONE TABLET BY MOUTH TWO TIMES DAILY      tamsulosin (FLOMAX) 0.4 MG capsule TAKE TWO CAPSULES BY MOUTH ONCE DAILY      triamcinolone (NASACORT) 55 MCG/ACT nasal inhaler 2 sprays daily as needed       No current facility-administered medications for this visit.      Health Maintenance   Topic Date Due    Lipids  Never done    DTaP/Tdap/Td vaccine (1 - Tdap) Never done    Shingles vaccine (1 of 2) Never done    Depression Monitoring  06/25/2022    Flu vaccine (Season Ended) 09/01/2022    Annual Wellness Visit (AWV)  12/30/2022    Pneumococcal 65+ years Vaccine  Completed    COVID-19 Vaccine  Completed    Hepatitis A vaccine  Aged Out    Hib vaccine  Aged Out    Meningococcal (ACWY) vaccine  Aged Out     Family History   Problem Relation Age of Onset    Heart Disease Father         CHF    Cancer Sister         BREAST CANCER    Heart Disease Sister         heart trouble of some sort    Diabetes Mother         DM             Review of Systems  Review of Systems    /62 (Site: Left Upper Arm, Position: Sitting, Cuff Size: Large Adult)   Pulse 67   Temp 97.3 °F (36.3 °C) (Temporal)   Resp 16   Ht 6' 2\" (1.88 m)   Wt 179 lb 12.8 oz (81.6 kg)   BMI 23.08 kg/m²       Physical Exam    Physical Exam  Constitutional:       General: He is not in acute distress. Appearance: Normal appearance. He is not ill-appearing. Comments: Weak and elderly male walking with a 4 post cane   HENT:      Head: Normocephalic and atraumatic. Eyes:      Extraocular Movements: Extraocular movements intact. Pupils: Pupils are equal, round, and reactive to light. Cardiovascular:      Rate and Rhythm: Normal rate and regular rhythm. Pulmonary:      Effort: Pulmonary effort is normal.      Breath sounds: Normal breath sounds. Skin:     General: Skin is warm. Neurological:      Mental Status: He is alert. Motor: No weakness. Psychiatric:         Mood and Affect: Mood normal.         Behavior: Behavior normal.         Thought Content: Thought content normal.         Judgment: Judgment normal.           Assessment & Plan    Encounter Diagnoses   Name Primary?     Controlled type 2 diabetes mellitus without complication, without long-term current use of insulin (HCC) Yes    Essential hypertension     Type 2 diabetes mellitus with other specified type 2 diabetes mellitus without complication, without long-term current use of insulin ([address]  Hopefully continues to have good control  - Lipid Panel; Future  - Hemoglobin A1C; Future    2. Essential hypertension  Good blood pressure control noted    3. Type 2 diabetes mellitus with other specified complication, without long-term current use of insulin ([address]       4. Major depressive disorder, single episode, moderate (HCC)  Able on present meds    5. Peripheral vascular disease, unspecified ([address]  Symptoms noted    6. Chronic fatigue     - Comprehensive Metabolic Panel; Future  - CBC with Auto Differential; Future    7. Gastroesophageal reflux disease without esophagitis  No recurrence of symptoms off treatment    8. Benign prostatic hyperplasia with lower urinary tract symptoms, symptom details unspecified  Still has urinary frequency has seen urology in the past    9. Due to loss of balance he is encouraged to use his walker at all times  No follow-up provider specified. Kyung Wiley MD  On the basis of positive falls risk screening, assessment and plan is as follows: home safety tips provided.

## 2022-07-05 RX ORDER — LISINOPRIL AND HYDROCHLOROTHIAZIDE 12.5; 1 MG/1; MG/1
TABLET ORAL
Qty: 90 TABLET | Refills: 3 | Status: SHIPPED | OUTPATIENT
Start: 2022-07-05

## 2022-07-25 RX ORDER — TAMSULOSIN HYDROCHLORIDE 0.4 MG/1
CAPSULE ORAL
Qty: 180 CAPSULE | Refills: 3 | Status: SHIPPED | OUTPATIENT
Start: 2022-07-25

## 2022-09-12 RX ORDER — ATORVASTATIN CALCIUM 80 MG/1
TABLET, FILM COATED ORAL
Qty: 90 TABLET | Refills: 3 | Status: SHIPPED | OUTPATIENT
Start: 2022-09-12

## 2022-09-12 RX ORDER — AMLODIPINE BESYLATE 5 MG/1
TABLET ORAL
Qty: 90 TABLET | Refills: 3 | Status: SHIPPED | OUTPATIENT
Start: 2022-09-12

## 2022-12-28 ENCOUNTER — OFFICE VISIT (OUTPATIENT)
Dept: INTERNAL MEDICINE CLINIC | Facility: CLINIC | Age: 87
End: 2022-12-28
Payer: MEDICARE

## 2022-12-28 VITALS
TEMPERATURE: 97.1 F | HEIGHT: 74 IN | HEART RATE: 75 BPM | SYSTOLIC BLOOD PRESSURE: 133 MMHG | RESPIRATION RATE: 16 BRPM | DIASTOLIC BLOOD PRESSURE: 70 MMHG | WEIGHT: 176.2 LBS | BODY MASS INDEX: 22.61 KG/M2

## 2022-12-28 DIAGNOSIS — F32.1 MAJOR DEPRESSIVE DISORDER, SINGLE EPISODE, MODERATE (HCC): ICD-10-CM

## 2022-12-28 DIAGNOSIS — E11.9 CONTROLLED TYPE 2 DIABETES MELLITUS WITHOUT COMPLICATION, WITHOUT LONG-TERM CURRENT USE OF INSULIN (HCC): Primary | ICD-10-CM

## 2022-12-28 DIAGNOSIS — N40.1 BENIGN PROSTATIC HYPERPLASIA WITH LOWER URINARY TRACT SYMPTOMS, SYMPTOM DETAILS UNSPECIFIED: ICD-10-CM

## 2022-12-28 DIAGNOSIS — I10 ESSENTIAL HYPERTENSION: ICD-10-CM

## 2022-12-28 DIAGNOSIS — R53.82 CHRONIC FATIGUE: ICD-10-CM

## 2022-12-28 DIAGNOSIS — J30.1 ALLERGIC RHINITIS DUE TO POLLEN, UNSPECIFIED SEASONALITY: ICD-10-CM

## 2022-12-28 DIAGNOSIS — K21.9 GASTROESOPHAGEAL REFLUX DISEASE WITHOUT ESOPHAGITIS: ICD-10-CM

## 2022-12-28 LAB
ANION GAP SERPL CALC-SCNC: 4 MMOL/L (ref 2–11)
BUN SERPL-MCNC: 12 MG/DL (ref 8–23)
CALCIUM SERPL-MCNC: 9.6 MG/DL (ref 8.3–10.4)
CHLORIDE SERPL-SCNC: 100 MMOL/L (ref 101–110)
CO2 SERPL-SCNC: 34 MMOL/L (ref 21–32)
CREAT SERPL-MCNC: 1 MG/DL (ref 0.8–1.5)
EST. AVERAGE GLUCOSE BLD GHB EST-MCNC: 146 MG/DL
GLUCOSE SERPL-MCNC: 136 MG/DL (ref 65–100)
HBA1C MFR BLD: 6.7 % (ref 4.8–5.6)
POTASSIUM SERPL-SCNC: 4 MMOL/L (ref 3.5–5.1)
SODIUM SERPL-SCNC: 138 MMOL/L (ref 133–143)

## 2022-12-28 PROCEDURE — G8420 CALC BMI NORM PARAMETERS: HCPCS | Performed by: INTERNAL MEDICINE

## 2022-12-28 PROCEDURE — 99214 OFFICE O/P EST MOD 30 MIN: CPT | Performed by: INTERNAL MEDICINE

## 2022-12-28 PROCEDURE — G8484 FLU IMMUNIZE NO ADMIN: HCPCS | Performed by: INTERNAL MEDICINE

## 2022-12-28 PROCEDURE — 1036F TOBACCO NON-USER: CPT | Performed by: INTERNAL MEDICINE

## 2022-12-28 PROCEDURE — G8427 DOCREV CUR MEDS BY ELIG CLIN: HCPCS | Performed by: INTERNAL MEDICINE

## 2022-12-28 PROCEDURE — 3044F HG A1C LEVEL LT 7.0%: CPT | Performed by: INTERNAL MEDICINE

## 2022-12-28 PROCEDURE — 1123F ACP DISCUSS/DSCN MKR DOCD: CPT | Performed by: INTERNAL MEDICINE

## 2022-12-28 RX ORDER — TRIAMCINOLONE ACETONIDE 55 UG/1
2 SPRAY, METERED NASAL DAILY
Qty: 1 EACH | Refills: 11 | Status: SHIPPED | OUTPATIENT
Start: 2022-12-28

## 2022-12-28 NOTE — PROGRESS NOTES
12/28/2022 10:50 AM  Location:[facility] [address] [place]INE  [place]  Patient #:  [id number]  YOB: 1935          YOUR LAST HEMOGLOBIN A1CS:   No results found for: HBA1C, [id number]    YOUR LAST LIPID PROFILE:   Lab Results   Component Value Date/Time    CHOL 115 06/23/2022 11:59 AM    HDL 58 06/23/2022 11:59 AM    VLDL 12 12/28/2020 11:23 AM         Lab Results   Component Value Date/Time    GFRAA >60 06/23/2022 11:59 AM    BUN 13 06/23/2022 11:59 AM     06/23/2022 11:59 AM    K 4.0 06/23/2022 11:59 AM     06/23/2022 11:59 AM    CO2 30 06/23/2022 11:59 AM           History of Present Illness     Chief Complaint   Patient presents with    6 Month Follow-Up     Pt presents to the office today for a 6 month follow-up      Diabetes     This patient does states he is still very weak and overall he is using his fourposter cane at all times no falls have occurred however.     Mr. Mckenzie Ho is a 81 y.o. male  who presents for office visit      Allergies   Allergen Reactions    Seasonal Other (See Comments)     Past Medical History:   Diagnosis Date    Abnormal weight gain     Acute bronchitis     Acute sinusitis, unspecified     Allergic rhinitis, cause unspecified 2/25/2015    Backache, unspecified 2/25/2015    Bladder neck obstruction     Carpal tunnel syndrome     Conjunctivitis unspecified     Cough     Delusional disorder(297.1) 2/25/2015    Dermatophytosis of nail     Dizziness and giddiness     Dyspepsia and other specified disorders of function of stomach     GERD (gastroesophageal reflux disease)     Hemorrhage of rectum and anus     Hypertrophy of prostate with urinary obstruction and other lower urinary tract symptoms (LUTS) 2/25/2015    Impotence of organic origin     Lipoma of unspecified site     Nausea alone     Nocturia     Nonspecific abnormal finding in stool contents     Other and unspecified hyperlipidemia 2/25/2015    Other malaise and fatigue Other persistent mental disorders due to conditions classified elsewhere     Pain in joint, lower leg     Rash and other nonspecific skin eruption     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 or other cause, without mention of thyrotoxic crisis or storm     Type II or unspecified type diabetes mellitus without mention of complication, not stated as uncontrolled 2/25/2015    Unspecified asthma(493.90) 2/25/2015    Unspecified essential hypertension 2/25/2015    Unspecified hereditary and idiopathic peripheral neuropathy     Unspecified paranoid state     Unspecified vertiginous syndromes and labyrinthine disorders     Urinary frequency     Urinary tract infection, site not specified 2/25/2015     Social History     Socioeconomic History    Marital status:      Spouse name: None    Number of children: None    Years of education: None    Highest education level: None   Tobacco Use    Smoking status: Former    Smokeless tobacco: Never   Substance and Sexual Activity    Alcohol use: No     Alcohol/week: 0.0 standard drinks    Drug use: No     Social Determinants of Health     Financial Resource Strain: Low Risk     Difficulty of Paying Living Expenses: Not hard at all   Food Insecurity: No Food Insecurity    Worried About Running Out of Food in the Last Year: Never true    Ran Out of Food in the Last Year: Never true     Past Surgical History:   Procedure Laterality Date    HEMORRHOID SURGERY      OTHER SURGICAL HISTORY      Keloid Surgery     OTHER SURGICAL HISTORY      Hydrocele     Current Outpatient Medications   Medication Sig Dispense Refill    TURMERIC CURCUMIN PO Take by mouth daily      triamcinolone (NASACORT) 55 MCG/ACT nasal inhaler 2 sprays by Nasal route daily 1 each 11    amLODIPine (NORVASC) 5 MG tablet Take 1 tablet by mouth once daily 90 tablet 3    metFORMIN (GLUCOPHAGE) 500 MG tablet Take 1 tablet by mouth twice daily 180 tablet 3    atorvastatin (LIPITOR) 80 MG tablet Take 1 tablet by mouth once daily 90 tablet 3    tamsulosin (FLOMAX) 0.4 MG capsule Take 2 capsules by mouth once daily 180 capsule 3    lisinopril-hydroCHLOROthiazide (PRINZIDE;ZESTORETIC) 10-12.5 MG per tablet Take 1 tablet by mouth once daily 90 tablet 3    escitalopram (LEXAPRO) 10 MG tablet Take 1 tablet by mouth once daily 90 tablet 3    Lancets MISC Check once daily or as directed. E11.9      ascorbic acid (VITAMIN C) 500 MG tablet Take 500 mg by mouth daily      aspirin 81 MG chewable tablet Take 81 mg by mouth daily      cetirizine (ZYRTEC) 10 MG tablet Take by mouth daily      cyanocobalamin 1000 MCG tablet Take 1,000 mcg by mouth daily      vitamin D 25 MCG (1000 UT) CAPS Take 1,000 Units by mouth daily       No current facility-administered medications for this visit. Health Maintenance   Topic Date Due    DTaP/Tdap/Td vaccine (1 - Tdap) Never done    Shingles vaccine (1 of 2) Never done    Annual Wellness Visit (AWV)  12/30/2022    Lipids  06/23/2023    Depression Monitoring  06/23/2023    Flu vaccine  Completed    Pneumococcal 65+ years Vaccine  Completed    COVID-19 Vaccine  Completed    Hepatitis A vaccine  Aged Out    Hib vaccine  Aged Out    Meningococcal (ACWY) vaccine  Aged Out     Family History   Problem Relation Age of Onset    Heart Disease Father         CHF    Cancer Sister         BREAST CANCER    Heart Disease Sister         heart trouble of some sort    Diabetes Mother         DM             Review of Systems  Review of Systems    /70 (Site: Left Upper Arm, Position: Sitting, Cuff Size: Large Adult)   Pulse 75   Temp 97.1 °F (36.2 °C) (Temporal)   Resp 16   Ht 6' 2\" (1.88 m)   Wt 176 lb 3.2 oz (79.9 kg)   BMI 22.62 kg/m²       Physical Exam    Physical Exam  Constitutional:       General: He is not in acute distress. Appearance: Normal appearance. He is not ill-appearing. HENT:      Head: Normocephalic and atraumatic. Eyes:      Extraocular Movements: Extraocular movements intact. Pupils: Pupils are equal, round, and reactive to light. Cardiovascular:      Rate and Rhythm: Normal rate and regular rhythm. Pulmonary:      Effort: Pulmonary effort is normal.      Breath sounds: Normal breath sounds. Skin:     General: Skin is warm. Neurological:      Mental Status: He is alert. Motor: No weakness. Psychiatric:         Mood and Affect: Mood normal.         Behavior: Behavior normal.         Thought Content: Thought content normal.         Judgment: Judgment normal.       Assessment & Plan    Encounter Diagnoses   Name Primary? Controlled type 2 diabetes mellitus without complication, without long-term current use of insulin ([facility]) Yes    Essential hypertension     Gastroesophageal reflux disease without esophagitis     Major depressive disorder, single episode, moderate ([facility])     Benign prostatic hyperplasia with lower urinary tract symptoms, symptom details unspecified     Chronic fatigue     Allergic rhinitis due to pollen, unspecified seasonality        Current Outpatient Medications   Medication Sig Dispense Refill    TURMERIC CURCUMIN PO Take by mouth daily      triamcinolone (NASACORT) 55 MCG/ACT nasal inhaler 2 sprays by Nasal route daily 1 each 11    amLODIPine (NORVASC) 5 MG tablet Take 1 tablet by mouth once daily 90 tablet 3    metFORMIN (GLUCOPHAGE) 500 MG tablet Take 1 tablet by mouth twice daily 180 tablet 3    atorvastatin (LIPITOR) 80 MG tablet Take 1 tablet by mouth once daily 90 tablet 3    tamsulosin (FLOMAX) 0.4 MG capsule Take 2 capsules by mouth once daily 180 capsule 3    lisinopril-hydroCHLOROthiazide (PRINZIDE;ZESTORETIC) 10-12.5 MG per tablet Take 1 tablet by mouth once daily 90 tablet 3    escitalopram (LEXAPRO) 10 MG tablet Take 1 tablet by mouth once daily 90 tablet 3    Lancets MISC Check once daily or as directed.  E11.9      ascorbic acid (VITAMIN C) 500 MG tablet Take 500 mg by mouth daily      aspirin 81 MG chewable tablet Take 81 mg by mouth daily      cetirizine (ZYRTEC) 10 MG tablet Take by mouth daily      cyanocobalamin 1000 MCG tablet Take 1,000 mcg by mouth daily      vitamin D 25 MCG (1000 UT) CAPS Take 1,000 Units by mouth daily       No current facility-administered medications for this visit. Orders Placed This Encounter   Procedures    Basic Metabolic Panel     Standing Status:   Future     Standing Expiration Date:   12/28/2023    Hemoglobin A1C     Standing Status:   Future     Standing Expiration Date:   12/28/2023       Medications Discontinued During This Encounter   Medication Reason    triamcinolone (NASACORT) 55 MCG/ACT nasal inhaler REORDER       1. Controlled type 2 diabetes mellitus without complication, without long-term current use of insulin (HCC)  Previously controlled  - Basic Metabolic Panel; Future  - Hemoglobin A1C; Future    2. Essential hypertension  Controlled    3. Gastroesophageal reflux disease without esophagitis       4. Major depressive disorder, single episode, moderate (HCC)  Doing well on present medications    5. Benign prostatic hyperplasia with lower urinary tract symptoms, symptom details unspecified  Stable    6. Chronic fatigue       7. Allergic rhinitis due to pollen, unspecified seasonality     - triamcinolone (NASACORT) 55 MCG/ACT nasal inhaler; 2 sprays by Nasal route daily  Dispense: 1 each; Refill: 11      No follow-up provider specified.         Kyung Butler MD

## 2023-06-28 RX ORDER — ESCITALOPRAM OXALATE 10 MG/1
10 TABLET ORAL DAILY
Qty: 90 TABLET | Refills: 3 | Status: SHIPPED | OUTPATIENT
Start: 2023-06-28

## 2023-07-06 ENCOUNTER — OFFICE VISIT (OUTPATIENT)
Dept: INTERNAL MEDICINE CLINIC | Facility: CLINIC | Age: 88
End: 2023-07-06
Payer: MEDICARE

## 2023-07-06 VITALS
RESPIRATION RATE: 16 BRPM | BODY MASS INDEX: 22.43 KG/M2 | HEIGHT: 74 IN | TEMPERATURE: 97.3 F | WEIGHT: 174.8 LBS | HEART RATE: 65 BPM | SYSTOLIC BLOOD PRESSURE: 103 MMHG | DIASTOLIC BLOOD PRESSURE: 56 MMHG

## 2023-07-06 DIAGNOSIS — Z23 NEED FOR PROPHYLACTIC VACCINATION AGAINST STREPTOCOCCUS PNEUMONIAE (PNEUMOCOCCUS): ICD-10-CM

## 2023-07-06 DIAGNOSIS — E11.69 TYPE 2 DIABETES MELLITUS WITH OTHER SPECIFIED COMPLICATION, WITHOUT LONG-TERM CURRENT USE OF INSULIN (HCC): ICD-10-CM

## 2023-07-06 DIAGNOSIS — N40.1 BENIGN PROSTATIC HYPERPLASIA WITH LOWER URINARY TRACT SYMPTOMS, SYMPTOM DETAILS UNSPECIFIED: ICD-10-CM

## 2023-07-06 DIAGNOSIS — F32.1 MAJOR DEPRESSIVE DISORDER, SINGLE EPISODE, MODERATE (HCC): ICD-10-CM

## 2023-07-06 DIAGNOSIS — R53.82 CHRONIC FATIGUE: ICD-10-CM

## 2023-07-06 DIAGNOSIS — I73.9 PERIPHERAL VASCULAR DISEASE, UNSPECIFIED (HCC): ICD-10-CM

## 2023-07-06 DIAGNOSIS — E11.9 CONTROLLED TYPE 2 DIABETES MELLITUS WITHOUT COMPLICATION, WITHOUT LONG-TERM CURRENT USE OF INSULIN (HCC): Primary | ICD-10-CM

## 2023-07-06 DIAGNOSIS — I10 ESSENTIAL HYPERTENSION: ICD-10-CM

## 2023-07-06 LAB
ALBUMIN SERPL-MCNC: 3.5 G/DL (ref 3.2–4.6)
ALBUMIN/GLOB SERPL: 1.1 (ref 0.4–1.6)
ALP SERPL-CCNC: 45 U/L (ref 50–136)
ALT SERPL-CCNC: 29 U/L (ref 12–65)
ANION GAP SERPL CALC-SCNC: 5 MMOL/L (ref 2–11)
AST SERPL-CCNC: 16 U/L (ref 15–37)
BASOPHILS # BLD: 0.1 K/UL (ref 0–0.2)
BASOPHILS NFR BLD: 1 % (ref 0–2)
BILIRUB SERPL-MCNC: 0.6 MG/DL (ref 0.2–1.1)
BUN SERPL-MCNC: 13 MG/DL (ref 8–23)
CALCIUM SERPL-MCNC: 9.4 MG/DL (ref 8.3–10.4)
CHLORIDE SERPL-SCNC: 102 MMOL/L (ref 101–110)
CHOLEST SERPL-MCNC: 112 MG/DL
CO2 SERPL-SCNC: 31 MMOL/L (ref 21–32)
CREAT SERPL-MCNC: 1.1 MG/DL (ref 0.8–1.5)
DIFFERENTIAL METHOD BLD: ABNORMAL
EOSINOPHIL # BLD: 0.3 K/UL (ref 0–0.8)
EOSINOPHIL NFR BLD: 5 % (ref 0.5–7.8)
ERYTHROCYTE [DISTWIDTH] IN BLOOD BY AUTOMATED COUNT: 12.6 % (ref 11.9–14.6)
GLOBULIN SER CALC-MCNC: 3.2 G/DL (ref 2.8–4.5)
GLUCOSE SERPL-MCNC: 111 MG/DL (ref 65–100)
HCT VFR BLD AUTO: 37.3 % (ref 41.1–50.3)
HDLC SERPL-MCNC: 68 MG/DL (ref 40–60)
HDLC SERPL: 1.6
HGB BLD-MCNC: 12.4 G/DL (ref 13.6–17.2)
IMM GRANULOCYTES # BLD AUTO: 0 K/UL (ref 0–0.5)
IMM GRANULOCYTES NFR BLD AUTO: 0 % (ref 0–5)
LDLC SERPL CALC-MCNC: 35.2 MG/DL
LYMPHOCYTES # BLD: 1.3 K/UL (ref 0.5–4.6)
LYMPHOCYTES NFR BLD: 24 % (ref 13–44)
MCH RBC QN AUTO: 27.1 PG (ref 26.1–32.9)
MCHC RBC AUTO-ENTMCNC: 33.2 G/DL (ref 31.4–35)
MCV RBC AUTO: 81.6 FL (ref 82–102)
MONOCYTES # BLD: 0.6 K/UL (ref 0.1–1.3)
MONOCYTES NFR BLD: 11 % (ref 4–12)
NEUTS SEG # BLD: 3.1 K/UL (ref 1.7–8.2)
NEUTS SEG NFR BLD: 59 % (ref 43–78)
NRBC # BLD: 0 K/UL (ref 0–0.2)
PLATELET # BLD AUTO: 172 K/UL (ref 150–450)
PMV BLD AUTO: 10.8 FL (ref 9.4–12.3)
POTASSIUM SERPL-SCNC: 3.9 MMOL/L (ref 3.5–5.1)
PROT SERPL-MCNC: 6.7 G/DL (ref 6.3–8.2)
PSA SERPL-MCNC: 1.1 NG/ML
RBC # BLD AUTO: 4.57 M/UL (ref 4.23–5.6)
SODIUM SERPL-SCNC: 138 MMOL/L (ref 133–143)
TRIGL SERPL-MCNC: 44 MG/DL (ref 35–150)
VLDLC SERPL CALC-MCNC: 8.8 MG/DL (ref 6–23)
WBC # BLD AUTO: 5.3 K/UL (ref 4.3–11.1)

## 2023-07-06 PROCEDURE — G8420 CALC BMI NORM PARAMETERS: HCPCS | Performed by: INTERNAL MEDICINE

## 2023-07-06 PROCEDURE — 90677 PCV20 VACCINE IM: CPT | Performed by: INTERNAL MEDICINE

## 2023-07-06 PROCEDURE — 1123F ACP DISCUSS/DSCN MKR DOCD: CPT | Performed by: INTERNAL MEDICINE

## 2023-07-06 PROCEDURE — G0009 ADMIN PNEUMOCOCCAL VACCINE: HCPCS | Performed by: INTERNAL MEDICINE

## 2023-07-06 PROCEDURE — 1036F TOBACCO NON-USER: CPT | Performed by: INTERNAL MEDICINE

## 2023-07-06 PROCEDURE — G8427 DOCREV CUR MEDS BY ELIG CLIN: HCPCS | Performed by: INTERNAL MEDICINE

## 2023-07-06 PROCEDURE — 99214 OFFICE O/P EST MOD 30 MIN: CPT | Performed by: INTERNAL MEDICINE

## 2023-07-06 RX ORDER — TAMSULOSIN HYDROCHLORIDE 0.4 MG/1
0.8 CAPSULE ORAL DAILY
Qty: 180 CAPSULE | Refills: 3 | Status: SHIPPED | OUTPATIENT
Start: 2023-07-06

## 2023-07-06 RX ORDER — LISINOPRIL AND HYDROCHLOROTHIAZIDE 12.5; 1 MG/1; MG/1
1 TABLET ORAL DAILY
Qty: 90 TABLET | Refills: 3 | Status: SHIPPED | OUTPATIENT
Start: 2023-07-06

## 2023-07-06 RX ORDER — FINASTERIDE 5 MG/1
5 TABLET, FILM COATED ORAL DAILY
Qty: 30 TABLET | Refills: 3 | Status: SHIPPED | OUTPATIENT
Start: 2023-07-06

## 2023-07-06 SDOH — ECONOMIC STABILITY: FOOD INSECURITY: WITHIN THE PAST 12 MONTHS, THE FOOD YOU BOUGHT JUST DIDN'T LAST AND YOU DIDN'T HAVE MONEY TO GET MORE.: NEVER TRUE

## 2023-07-06 SDOH — ECONOMIC STABILITY: FOOD INSECURITY: WITHIN THE PAST 12 MONTHS, YOU WORRIED THAT YOUR FOOD WOULD RUN OUT BEFORE YOU GOT MONEY TO BUY MORE.: NEVER TRUE

## 2023-07-06 SDOH — ECONOMIC STABILITY: INCOME INSECURITY: HOW HARD IS IT FOR YOU TO PAY FOR THE VERY BASICS LIKE FOOD, HOUSING, MEDICAL CARE, AND HEATING?: NOT HARD AT ALL

## 2023-07-06 SDOH — ECONOMIC STABILITY: HOUSING INSECURITY
IN THE LAST 12 MONTHS, WAS THERE A TIME WHEN YOU DID NOT HAVE A STEADY PLACE TO SLEEP OR SLEPT IN A SHELTER (INCLUDING NOW)?: NO

## 2023-07-06 NOTE — PROGRESS NOTES
7/6/2023 10:51 AM  Location:[address] [place]INE  [place]  Patient #:  [id number]  YOB: 1935          YOUR LAST HEMOGLOBIN A1CS:   No results found for: HBA1C, [id number]    YOUR LAST LIPID PROFILE:   Lab Results   Component Value Date/Time    CHOL 115 06/23/2022 11:59 AM    HDL 58 06/23/2022 11:59 AM    VLDL 12 12/28/2020 11:23 AM         Lab Results   Component Value Date/Time    GFRAA >60 06/23/2022 11:59 AM    BUN 12 12/28/2022 11:02 AM     12/28/2022 11:02 AM    K 4.0 12/28/2022 11:02 AM     12/28/2022 11:02 AM    CO2 34 12/28/2022 11:02 AM           History of Present Illness     Chief Complaint   Patient presents with    6 Month Follow-Up     Pt presents to the office today for a 6 month follow-up       This patient states that he is having no new issues. He has had no falls but is having some problems with his equilibrium. He does not check blood sugars on a regular basis. He is having increasing urinary frequency nocturia 4-5 times a night.   He has been compliant with tamsulosin    Mr. Ester Alvarado is a 81 y.o. male  who presents for office visit      Allergies   Allergen Reactions    Seasonal Other (See Comments)     Past Medical History:   Diagnosis Date    Abnormal weight gain     Acute bronchitis     Acute sinusitis, unspecified     Allergic rhinitis, cause unspecified 2/25/2015    Backache, unspecified 2/25/2015    Bladder neck obstruction     Carpal tunnel syndrome     Conjunctivitis unspecified     Cough     Delusional disorder(297.1) 2/25/2015    Dermatophytosis of nail     Dizziness and giddiness     Dyspepsia and other specified disorders of function of stomach     GERD (gastroesophageal reflux disease)     Hemorrhage of rectum and anus     Hypertrophy of prostate with urinary obstruction and other lower urinary tract symptoms (LUTS) 2/25/2015    Impotence of organic origin     Lipoma of unspecified site     Nausea alone     Nocturia

## 2023-07-07 ENCOUNTER — TELEPHONE (OUTPATIENT)
Dept: INTERNAL MEDICINE CLINIC | Facility: CLINIC | Age: 88
End: 2023-07-07

## 2023-07-07 LAB
EST. AVERAGE GLUCOSE BLD GHB EST-MCNC: 143 MG/DL
HBA1C MFR BLD: 6.6 % (ref 4.8–5.6)

## 2023-07-07 NOTE — TELEPHONE ENCOUNTER
----- Message from Sana Hernandez MD sent at 7/7/2023  6:57 AM EDT -----  Please notify patient that their lab results are normal.

## 2023-08-02 RX ORDER — AMLODIPINE BESYLATE 5 MG/1
TABLET ORAL
Qty: 90 TABLET | Refills: 3 | Status: SHIPPED | OUTPATIENT
Start: 2023-08-02

## 2023-08-02 RX ORDER — ATORVASTATIN CALCIUM 80 MG/1
TABLET, FILM COATED ORAL
Qty: 90 TABLET | Refills: 3 | Status: SHIPPED | OUTPATIENT
Start: 2023-08-02

## 2023-10-04 DIAGNOSIS — N40.1 BENIGN PROSTATIC HYPERPLASIA WITH LOWER URINARY TRACT SYMPTOMS, SYMPTOM DETAILS UNSPECIFIED: ICD-10-CM

## 2023-10-04 RX ORDER — FINASTERIDE 5 MG/1
5 TABLET, FILM COATED ORAL DAILY
Qty: 90 TABLET | Refills: 3 | Status: SHIPPED | OUTPATIENT
Start: 2023-10-04

## 2024-01-12 ENCOUNTER — OFFICE VISIT (OUTPATIENT)
Dept: INTERNAL MEDICINE CLINIC | Facility: CLINIC | Age: 89
End: 2024-01-12
Payer: MEDICARE

## 2024-01-12 VITALS
HEART RATE: 62 BPM | RESPIRATION RATE: 16 BRPM | WEIGHT: 170.6 LBS | SYSTOLIC BLOOD PRESSURE: 130 MMHG | HEIGHT: 74 IN | DIASTOLIC BLOOD PRESSURE: 51 MMHG | BODY MASS INDEX: 21.89 KG/M2 | TEMPERATURE: 97.2 F

## 2024-01-12 DIAGNOSIS — J45.909 MILD ASTHMA WITHOUT COMPLICATION, UNSPECIFIED WHETHER PERSISTENT: ICD-10-CM

## 2024-01-12 DIAGNOSIS — N40.1 BENIGN PROSTATIC HYPERPLASIA WITH LOWER URINARY TRACT SYMPTOMS, SYMPTOM DETAILS UNSPECIFIED: ICD-10-CM

## 2024-01-12 DIAGNOSIS — Z00.00 MEDICARE ANNUAL WELLNESS VISIT, SUBSEQUENT: ICD-10-CM

## 2024-01-12 DIAGNOSIS — E11.69 TYPE 2 DIABETES MELLITUS WITH OTHER SPECIFIED COMPLICATION, WITHOUT LONG-TERM CURRENT USE OF INSULIN (HCC): ICD-10-CM

## 2024-01-12 DIAGNOSIS — E78.5 HYPERLIPIDEMIA, UNSPECIFIED HYPERLIPIDEMIA TYPE: ICD-10-CM

## 2024-01-12 DIAGNOSIS — K21.9 GASTROESOPHAGEAL REFLUX DISEASE WITHOUT ESOPHAGITIS: ICD-10-CM

## 2024-01-12 DIAGNOSIS — F32.1 MAJOR DEPRESSIVE DISORDER, SINGLE EPISODE, MODERATE (HCC): ICD-10-CM

## 2024-01-12 DIAGNOSIS — I10 ESSENTIAL HYPERTENSION: ICD-10-CM

## 2024-01-12 DIAGNOSIS — I73.9 PERIPHERAL VASCULAR DISEASE, UNSPECIFIED (HCC): ICD-10-CM

## 2024-01-12 DIAGNOSIS — E11.9 CONTROLLED TYPE 2 DIABETES MELLITUS WITHOUT COMPLICATION, WITHOUT LONG-TERM CURRENT USE OF INSULIN (HCC): Primary | ICD-10-CM

## 2024-01-12 PROBLEM — R60.9 DEPENDENT EDEMA: Status: RESOLVED | Noted: 2017-12-21 | Resolved: 2024-01-12

## 2024-01-12 PROBLEM — R10.13 DYSPEPSIA: Status: RESOLVED | Noted: 2018-03-21 | Resolved: 2024-01-12

## 2024-01-12 PROBLEM — F41.9 ANXIETY DISORDER: Status: RESOLVED | Noted: 2018-06-21 | Resolved: 2024-01-12

## 2024-01-12 LAB
ANION GAP SERPL CALC-SCNC: 3 MMOL/L (ref 2–11)
BUN SERPL-MCNC: 12 MG/DL (ref 8–23)
CALCIUM SERPL-MCNC: 9.3 MG/DL (ref 8.3–10.4)
CHLORIDE SERPL-SCNC: 102 MMOL/L (ref 103–113)
CO2 SERPL-SCNC: 33 MMOL/L (ref 21–32)
CREAT SERPL-MCNC: 0.9 MG/DL (ref 0.8–1.5)
EST. AVERAGE GLUCOSE BLD GHB EST-MCNC: 134 MG/DL
GLUCOSE SERPL-MCNC: 122 MG/DL (ref 65–100)
HBA1C MFR BLD: 6.3 % (ref 4.8–5.6)
POTASSIUM SERPL-SCNC: 4 MMOL/L (ref 3.5–5.1)
SODIUM SERPL-SCNC: 138 MMOL/L (ref 136–146)

## 2024-01-12 PROCEDURE — G0439 PPPS, SUBSEQ VISIT: HCPCS | Performed by: INTERNAL MEDICINE

## 2024-01-12 PROCEDURE — 1036F TOBACCO NON-USER: CPT | Performed by: INTERNAL MEDICINE

## 2024-01-12 PROCEDURE — G8420 CALC BMI NORM PARAMETERS: HCPCS | Performed by: INTERNAL MEDICINE

## 2024-01-12 PROCEDURE — G8427 DOCREV CUR MEDS BY ELIG CLIN: HCPCS | Performed by: INTERNAL MEDICINE

## 2024-01-12 PROCEDURE — 1123F ACP DISCUSS/DSCN MKR DOCD: CPT | Performed by: INTERNAL MEDICINE

## 2024-01-12 PROCEDURE — G8484 FLU IMMUNIZE NO ADMIN: HCPCS | Performed by: INTERNAL MEDICINE

## 2024-01-12 PROCEDURE — 99213 OFFICE O/P EST LOW 20 MIN: CPT | Performed by: INTERNAL MEDICINE

## 2024-01-12 ASSESSMENT — PATIENT HEALTH QUESTIONNAIRE - PHQ9
1. LITTLE INTEREST OR PLEASURE IN DOING THINGS: 0
SUM OF ALL RESPONSES TO PHQ9 QUESTIONS 1 & 2: 0
10. IF YOU CHECKED OFF ANY PROBLEMS, HOW DIFFICULT HAVE THESE PROBLEMS MADE IT FOR YOU TO DO YOUR WORK, TAKE CARE OF THINGS AT HOME, OR GET ALONG WITH OTHER PEOPLE: 0
9. THOUGHTS THAT YOU WOULD BE BETTER OFF DEAD, OR OF HURTING YOURSELF: 0
SUM OF ALL RESPONSES TO PHQ QUESTIONS 1-9: 0
5. POOR APPETITE OR OVEREATING: 0
7. TROUBLE CONCENTRATING ON THINGS, SUCH AS READING THE NEWSPAPER OR WATCHING TELEVISION: 0
8. MOVING OR SPEAKING SO SLOWLY THAT OTHER PEOPLE COULD HAVE NOTICED. OR THE OPPOSITE, BEING SO FIGETY OR RESTLESS THAT YOU HAVE BEEN MOVING AROUND A LOT MORE THAN USUAL: 0
3. TROUBLE FALLING OR STAYING ASLEEP: 0
2. FEELING DOWN, DEPRESSED OR HOPELESS: 0
4. FEELING TIRED OR HAVING LITTLE ENERGY: 0
SUM OF ALL RESPONSES TO PHQ QUESTIONS 1-9: 0
6. FEELING BAD ABOUT YOURSELF - OR THAT YOU ARE A FAILURE OR HAVE LET YOURSELF OR YOUR FAMILY DOWN: 0

## 2024-01-12 ASSESSMENT — LIFESTYLE VARIABLES
HOW OFTEN DO YOU HAVE A DRINK CONTAINING ALCOHOL: NEVER
HOW MANY STANDARD DRINKS CONTAINING ALCOHOL DO YOU HAVE ON A TYPICAL DAY: PATIENT DOES NOT DRINK

## 2024-01-12 NOTE — PROGRESS NOTES
1/12/2024 1:03 PM  Location:[facility] PHYSICIAN SERVICES  [facility] [place]  [place]  Patient #:  [id number]  YOB: 1935          YOUR LAST HEMOGLOBIN A1CS:   No results found for: \"HBA1C\", \"[id number]\"    YOUR LAST LIPID PROFILE:   Lab Results   Component Value Date/Time    CHOL 112 07/06/2023 10:57 AM    HDL 68 07/06/2023 10:57 AM    VLDL 12 12/28/2020 11:23 AM         Lab Results   Component Value Date/Time    GFRAA >60 06/23/2022 11:59 AM    BUN 13 07/06/2023 10:57 AM     07/06/2023 10:57 AM    K 3.9 07/06/2023 10:57 AM     07/06/2023 10:57 AM    CO2 31 07/06/2023 10:57 AM           History of Present Illness     Chief Complaint   Patient presents with    Medicare AWV     subsequent    6 Month Follow-Up     Pt presents to the office today for a 6 month follow-up     This patient says he is doing well overall.  He is not having any new issues    Mr. Vasquez is a 88 y.o. 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

## 2024-01-12 NOTE — PATIENT INSTRUCTIONS
High-fiber foods include whole-grain cereals and breads, oatmeal, beans, brown rice, citrus fruits, and apples.     Eat lean proteins. Heart-healthy proteins include seafood, lean meats and poultry, eggs, beans, peas, nuts, seeds, and soy products.     Limit drinks and foods with added sugar. These include candy, desserts, and soda pop.   Lifestyle changes    If your doctor recommends it, get more exercise. Walking is a good choice. Bit by bit, increase the amount you walk every day. Try for at least 30 minutes on most days of the week. You also may want to swim, bike, or do other activities.     Do not smoke. If you need help quitting, talk to your doctor about stop-smoking programs and medicines. These can increase your chances of quitting for good. Quitting smoking may be the most important step you can take to protect your heart. It is never too late to quit.     Limit alcohol to 2 drinks a day for men and 1 drink a day for women. Too much alcohol can cause health problems.     Manage other health problems such as diabetes, high blood pressure, and high cholesterol. If you think you may have a problem with alcohol or drug use, talk to your doctor.   Medicines    Take your medicines exactly as prescribed. Call your doctor if you think you are having a problem with your medicine.     If your doctor recommends aspirin, take the amount directed each day. Make sure you take aspirin and not another kind of pain reliever, such as acetaminophen (Tylenol).   When should you call for help?   Call 911 if you have symptoms of a heart attack. These may include:    Chest pain or pressure, or a strange feeling in the chest.     Sweating.     Shortness of breath.     Pain, pressure, or a strange feeling in the back, neck, jaw, or upper belly or in one or both shoulders or arms.     Lightheadedness or sudden weakness.     A fast or irregular heartbeat.   After you call 911, the  may tell you to chew 1 adult-strength or 2

## 2024-04-11 ENCOUNTER — OFFICE VISIT (OUTPATIENT)
Dept: INTERNAL MEDICINE CLINIC | Facility: CLINIC | Age: 89
End: 2024-04-11

## 2024-04-11 VITALS
WEIGHT: 176.4 LBS | DIASTOLIC BLOOD PRESSURE: 60 MMHG | BODY MASS INDEX: 22.64 KG/M2 | RESPIRATION RATE: 18 BRPM | SYSTOLIC BLOOD PRESSURE: 112 MMHG | TEMPERATURE: 98.6 F | HEART RATE: 60 BPM | OXYGEN SATURATION: 98 % | HEIGHT: 74 IN

## 2024-04-11 DIAGNOSIS — I67.1 ANEURYSM OF ANTERIOR CEREBRAL ARTERY: ICD-10-CM

## 2024-04-11 DIAGNOSIS — I10 ESSENTIAL HYPERTENSION: ICD-10-CM

## 2024-04-11 DIAGNOSIS — Z09 HOSPITAL DISCHARGE FOLLOW-UP: Primary | ICD-10-CM

## 2024-04-11 DIAGNOSIS — E11.9 CONTROLLED TYPE 2 DIABETES MELLITUS WITHOUT COMPLICATION, WITHOUT LONG-TERM CURRENT USE OF INSULIN (HCC): ICD-10-CM

## 2024-04-11 RX ORDER — AMLODIPINE AND BENAZEPRIL HYDROCHLORIDE 10; 40 MG/1; MG/1
1 CAPSULE ORAL DAILY
COMMUNITY
Start: 2024-03-30 | End: 2025-03-30

## 2024-04-11 RX ORDER — PANTOPRAZOLE SODIUM 40 MG/1
40 TABLET, DELAYED RELEASE ORAL DAILY
COMMUNITY

## 2024-04-11 ASSESSMENT — ENCOUNTER SYMPTOMS
CONSTIPATION: 0
NAUSEA: 0
VOMITING: 0
ABDOMINAL PAIN: 0
SHORTNESS OF BREATH: 0
DIARRHEA: 0

## 2024-04-11 NOTE — PROGRESS NOTES
Mini mental test was performed today by nursing staff on patient, status was found to be 24 out 30    
daily      aspirin 81 MG chewable tablet Take 1 tablet by mouth daily      cetirizine (ZYRTEC) 10 MG tablet Take by mouth daily      cyanocobalamin 1000 MCG tablet Take 1 tablet by mouth daily       No current facility-administered medications for this visit.     1. Hospital discharge follow-up  Discussed medications in detail, discussed presenting to the emergency department for any acute stroke symptoms, discussed watching blood pressure and blood sugars closely.  Has home health.  Memory is declined but patient is currently able to perform ADLs, no acute findings on neuroexam.  Medication list updated, son will go home and make sure he is on the medications that were prescribed on discharge.    2. Controlled type 2 diabetes mellitus without complication, without long-term current use of insulin ([facility])  A1c 6.9, on metformin 500 twice daily    3. Essential hypertension  Changed while in the hospital from lisinopril-HCTZ and amlodipine to amlodipine-benazepril only.  Blood pressures have been stable, continue monitoring.    4. Aneurysm of anterior cerebral artery  Needs follow-up for findings on CTA, referred to [facility].  Again, discussed stroke symptoms, continue aspirin and statin therapy.  Control blood pressure.  - External Referral to Vascular Surgery          Sara Campbell, APRN - CNP

## 2024-04-15 DIAGNOSIS — I67.1 ANEURYSM OF ANTERIOR CEREBRAL ARTERY: Primary | ICD-10-CM

## 2024-06-11 RX ORDER — ESCITALOPRAM OXALATE 10 MG/1
10 TABLET ORAL DAILY
Qty: 90 TABLET | Refills: 3 | Status: SHIPPED | OUTPATIENT
Start: 2024-06-11

## 2024-07-01 ENCOUNTER — TELEPHONE (OUTPATIENT)
Dept: INTERNAL MEDICINE CLINIC | Facility: CLINIC | Age: 89
End: 2024-07-01

## 2024-07-01 NOTE — TELEPHONE ENCOUNTER
[facility] clinical lab is needing someone to call him in regards to his UTI.  He has faxed over some paper to be signed. He states that this is two pages along with the cover sheet.  He wants this faxed back to him at [phone]

## 2024-07-01 NOTE — TELEPHONE ENCOUNTER
Called [facility]  Informed the representative that the pt will need to contact the office if he is having symptoms to schedule an appt.    We do our own UA/Culture labs inhouse - Request Denied  Called pt he states he is fine and isn't having any issues..

## 2024-07-12 ENCOUNTER — OFFICE VISIT (OUTPATIENT)
Dept: INTERNAL MEDICINE CLINIC | Facility: CLINIC | Age: 89
End: 2024-07-12
Payer: MEDICARE

## 2024-07-12 VITALS
HEART RATE: 68 BPM | RESPIRATION RATE: 16 BRPM | HEIGHT: 74 IN | WEIGHT: 162.2 LBS | OXYGEN SATURATION: 97 % | BODY MASS INDEX: 20.82 KG/M2 | TEMPERATURE: 97.3 F | DIASTOLIC BLOOD PRESSURE: 67 MMHG | SYSTOLIC BLOOD PRESSURE: 134 MMHG

## 2024-07-12 DIAGNOSIS — I10 ESSENTIAL HYPERTENSION: ICD-10-CM

## 2024-07-12 DIAGNOSIS — K21.9 GASTROESOPHAGEAL REFLUX DISEASE WITHOUT ESOPHAGITIS: ICD-10-CM

## 2024-07-12 DIAGNOSIS — I73.9 PERIPHERAL VASCULAR DISEASE (HCC): ICD-10-CM

## 2024-07-12 DIAGNOSIS — E11.69 TYPE 2 DIABETES MELLITUS WITH OTHER SPECIFIED COMPLICATION, WITHOUT LONG-TERM CURRENT USE OF INSULIN (HCC): Primary | ICD-10-CM

## 2024-07-12 DIAGNOSIS — F32.1 MAJOR DEPRESSIVE DISORDER, SINGLE EPISODE, MODERATE (HCC): ICD-10-CM

## 2024-07-12 DIAGNOSIS — R53.82 CHRONIC FATIGUE: ICD-10-CM

## 2024-07-12 DIAGNOSIS — N40.1 BENIGN PROSTATIC HYPERPLASIA WITH LOWER URINARY TRACT SYMPTOMS, SYMPTOM DETAILS UNSPECIFIED: ICD-10-CM

## 2024-07-12 PROBLEM — I69.30 HISTORY OF STROKE WITH RESIDUAL DEFICIT: Status: RESOLVED | Noted: 2017-12-21 | Resolved: 2024-07-12

## 2024-07-12 LAB
ALBUMIN SERPL-MCNC: 3.8 G/DL (ref 3.2–4.6)
ALBUMIN/GLOB SERPL: 1.2 (ref 1–1.9)
ALP SERPL-CCNC: 52 U/L (ref 40–129)
ALT SERPL-CCNC: 19 U/L (ref 12–65)
ANION GAP SERPL CALC-SCNC: 9 MMOL/L (ref 9–18)
AST SERPL-CCNC: 22 U/L (ref 15–37)
BASOPHILS # BLD: 0 K/UL (ref 0–0.2)
BASOPHILS NFR BLD: 1 % (ref 0–2)
BILIRUB SERPL-MCNC: 0.6 MG/DL (ref 0–1.2)
BUN SERPL-MCNC: 8 MG/DL (ref 8–23)
CALCIUM SERPL-MCNC: 9.3 MG/DL (ref 8.8–10.2)
CHLORIDE SERPL-SCNC: 100 MMOL/L (ref 98–107)
CHOLEST SERPL-MCNC: 137 MG/DL (ref 0–200)
CO2 SERPL-SCNC: 30 MMOL/L (ref 20–28)
CREAT SERPL-MCNC: 0.92 MG/DL (ref 0.8–1.3)
DIFFERENTIAL METHOD BLD: ABNORMAL
EOSINOPHIL # BLD: 0.3 K/UL (ref 0–0.8)
EOSINOPHIL NFR BLD: 6 % (ref 0.5–7.8)
ERYTHROCYTE [DISTWIDTH] IN BLOOD BY AUTOMATED COUNT: 12.9 % (ref 11.9–14.6)
EST. AVERAGE GLUCOSE BLD GHB EST-MCNC: 164 MG/DL
GLOBULIN SER CALC-MCNC: 3.1 G/DL (ref 2.3–3.5)
GLUCOSE SERPL-MCNC: 103 MG/DL (ref 70–99)
HBA1C MFR BLD: 7.3 % (ref 0–5.6)
HCT VFR BLD AUTO: 39.3 % (ref 41.1–50.3)
HDLC SERPL-MCNC: 60 MG/DL (ref 40–60)
HDLC SERPL: 2.3 (ref 0–5)
HGB BLD-MCNC: 12.7 G/DL (ref 13.6–17.2)
IMM GRANULOCYTES # BLD AUTO: 0 K/UL (ref 0–0.5)
IMM GRANULOCYTES NFR BLD AUTO: 0 % (ref 0–5)
LDLC SERPL CALC-MCNC: 68 MG/DL (ref 0–100)
LYMPHOCYTES # BLD: 1.3 K/UL (ref 0.5–4.6)
LYMPHOCYTES NFR BLD: 24 % (ref 13–44)
MCH RBC QN AUTO: 27.3 PG (ref 26.1–32.9)
MCHC RBC AUTO-ENTMCNC: 32.3 G/DL (ref 31.4–35)
MCV RBC AUTO: 84.5 FL (ref 82–102)
MONOCYTES # BLD: 0.5 K/UL (ref 0.1–1.3)
MONOCYTES NFR BLD: 10 % (ref 4–12)
NEUTS SEG # BLD: 3.3 K/UL (ref 1.7–8.2)
NEUTS SEG NFR BLD: 59 % (ref 43–78)
NRBC # BLD: 0 K/UL (ref 0–0.2)
PLATELET # BLD AUTO: 191 K/UL (ref 150–450)
PMV BLD AUTO: 11 FL (ref 9.4–12.3)
POTASSIUM SERPL-SCNC: 3.9 MMOL/L (ref 3.5–5.1)
PROT SERPL-MCNC: 6.8 G/DL (ref 6.3–8.2)
RBC # BLD AUTO: 4.65 M/UL (ref 4.23–5.6)
SODIUM SERPL-SCNC: 139 MMOL/L (ref 136–145)
TRIGL SERPL-MCNC: 47 MG/DL (ref 0–150)
TSH, 3RD GENERATION: 3.2 UIU/ML (ref 0.27–4.2)
VLDLC SERPL CALC-MCNC: 9 MG/DL (ref 6–23)
WBC # BLD AUTO: 5.5 K/UL (ref 4.3–11.1)

## 2024-07-12 PROCEDURE — 1036F TOBACCO NON-USER: CPT | Performed by: INTERNAL MEDICINE

## 2024-07-12 PROCEDURE — 99214 OFFICE O/P EST MOD 30 MIN: CPT | Performed by: INTERNAL MEDICINE

## 2024-07-12 PROCEDURE — G8420 CALC BMI NORM PARAMETERS: HCPCS | Performed by: INTERNAL MEDICINE

## 2024-07-12 PROCEDURE — 3044F HG A1C LEVEL LT 7.0%: CPT | Performed by: INTERNAL MEDICINE

## 2024-07-12 PROCEDURE — G8427 DOCREV CUR MEDS BY ELIG CLIN: HCPCS | Performed by: INTERNAL MEDICINE

## 2024-07-12 PROCEDURE — 1123F ACP DISCUSS/DSCN MKR DOCD: CPT | Performed by: INTERNAL MEDICINE

## 2024-07-12 RX ORDER — FINASTERIDE 5 MG/1
5 TABLET, FILM COATED ORAL DAILY
Qty: 90 TABLET | Refills: 3 | Status: SHIPPED | OUTPATIENT
Start: 2024-07-12

## 2024-07-12 RX ORDER — TAMSULOSIN HYDROCHLORIDE 0.4 MG/1
0.8 CAPSULE ORAL DAILY
Qty: 180 CAPSULE | Refills: 3 | Status: SHIPPED | OUTPATIENT
Start: 2024-07-12

## 2024-07-12 RX ORDER — ATORVASTATIN CALCIUM 80 MG/1
80 TABLET, FILM COATED ORAL DAILY
Qty: 90 TABLET | Refills: 3 | Status: SHIPPED | OUTPATIENT
Start: 2024-07-12

## 2024-07-12 SDOH — ECONOMIC STABILITY: INCOME INSECURITY: HOW HARD IS IT FOR YOU TO PAY FOR THE VERY BASICS LIKE FOOD, HOUSING, MEDICAL CARE, AND HEATING?: NOT VERY HARD

## 2024-07-12 SDOH — ECONOMIC STABILITY: FOOD INSECURITY: WITHIN THE PAST 12 MONTHS, YOU WORRIED THAT YOUR FOOD WOULD RUN OUT BEFORE YOU GOT MONEY TO BUY MORE.: NEVER TRUE

## 2024-07-12 SDOH — ECONOMIC STABILITY: FOOD INSECURITY: WITHIN THE PAST 12 MONTHS, THE FOOD YOU BOUGHT JUST DIDN'T LAST AND YOU DIDN'T HAVE MONEY TO GET MORE.: NEVER TRUE

## 2024-07-12 NOTE — PROGRESS NOTES
7/12/2024 9:18 AM  Location:[facility] PHYSICIAN SERVICES  [facility] [place]  [place]  Patient #:  [id number]  YOB: 1935          YOUR LAST HEMOGLOBIN A1CS:   No results found for: \"HBA1C\", \"[id number]\"    YOUR LAST LIPID PROFILE:   Lab Results   Component Value Date/Time    CHOL 112 07/06/2023 10:57 AM    HDL 68 07/06/2023 10:57 AM    LDL 35.2 07/06/2023 10:57 AM    VLDL 8.8 07/06/2023 10:57 AM    VLDL 12 12/28/2020 11:23 AM         Lab Results   Component Value Date/Time    GFRAA >60 06/23/2022 11:59 AM    BUN 12 01/12/2024 10:05 AM     01/12/2024 10:05 AM    K 4.0 01/12/2024 10:05 AM     01/12/2024 10:05 AM    CO2 33 01/12/2024 10:05 AM           History of Present Illness     Chief Complaint   Patient presents with    6 Month Follow-Up     Pt presents to the office today for a 6 month follow-up     This patient says he is doing well overall.  He states his appetite is good but he is lost weight.  He denies any changes in his urinary flow.    Mr. Vasquez is a 89 y.o. 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

## 2024-07-19 RX ORDER — AMLODIPINE BESYLATE 5 MG/1
TABLET ORAL
Qty: 90 TABLET | Refills: 0 | OUTPATIENT
Start: 2024-07-19

## 2024-10-09 ENCOUNTER — TELEPHONE (OUTPATIENT)
Dept: INTERNAL MEDICINE CLINIC | Facility: CLINIC | Age: 89
End: 2024-10-09

## 2024-10-09 NOTE — TELEPHONE ENCOUNTER
Pts son called. Pt had found a full bottle of tamsulosin (FLOMAX) 0.4 MG capsule   He did not know if was to be taking it or not. Let him know it was on his current list of meds but would let him know if he should start now to take them.    Call back #[phone]

## 2024-10-10 NOTE — TELEPHONE ENCOUNTER
Called and spoke with the pt, he suppose to be taking the tamsulosin. Looks like he been filling the rx every 3 months.  Per pt he been taking the medication, son just didn't believe him when he told him he suppose to take the medication.

## 2025-01-15 ENCOUNTER — OFFICE VISIT (OUTPATIENT)
Dept: INTERNAL MEDICINE CLINIC | Facility: CLINIC | Age: 89
End: 2025-01-15

## 2025-01-15 VITALS
RESPIRATION RATE: 16 BRPM | SYSTOLIC BLOOD PRESSURE: 142 MMHG | TEMPERATURE: 97.2 F | HEIGHT: 74 IN | BODY MASS INDEX: 19.89 KG/M2 | DIASTOLIC BLOOD PRESSURE: 60 MMHG | WEIGHT: 155 LBS | HEART RATE: 61 BPM

## 2025-01-15 DIAGNOSIS — E11.69 TYPE 2 DIABETES MELLITUS WITH OTHER SPECIFIED COMPLICATION, WITHOUT LONG-TERM CURRENT USE OF INSULIN (HCC): Primary | ICD-10-CM

## 2025-01-15 DIAGNOSIS — R63.4 WEIGHT LOSS: ICD-10-CM

## 2025-01-15 DIAGNOSIS — F32.1 MAJOR DEPRESSIVE DISORDER, SINGLE EPISODE, MODERATE (HCC): ICD-10-CM

## 2025-01-15 DIAGNOSIS — Z00.00 MEDICARE ANNUAL WELLNESS VISIT, SUBSEQUENT: ICD-10-CM

## 2025-01-15 DIAGNOSIS — R68.89 OTHER GENERAL SYMPTOMS AND SIGNS: ICD-10-CM

## 2025-01-15 DIAGNOSIS — R53.82 CHRONIC FATIGUE: ICD-10-CM

## 2025-01-15 LAB
ALBUMIN SERPL-MCNC: 3.6 G/DL (ref 3.2–4.6)
ALBUMIN/GLOB SERPL: 1.3 (ref 1–1.9)
ALP SERPL-CCNC: 50 U/L (ref 40–129)
ALT SERPL-CCNC: 24 U/L (ref 8–55)
ANION GAP SERPL CALC-SCNC: 8 MMOL/L (ref 7–16)
AST SERPL-CCNC: 24 U/L (ref 15–37)
BASOPHILS # BLD: 0.03 K/UL (ref 0–0.2)
BASOPHILS NFR BLD: 0.6 % (ref 0–2)
BILIRUB SERPL-MCNC: 0.4 MG/DL (ref 0–1.2)
BUN SERPL-MCNC: 17 MG/DL (ref 8–23)
CALCIUM SERPL-MCNC: 9.3 MG/DL (ref 8.8–10.2)
CHLORIDE SERPL-SCNC: 103 MMOL/L (ref 98–107)
CO2 SERPL-SCNC: 31 MMOL/L (ref 20–29)
CREAT SERPL-MCNC: 0.76 MG/DL (ref 0.8–1.3)
DIFFERENTIAL METHOD BLD: ABNORMAL
EOSINOPHIL # BLD: 0.25 K/UL (ref 0–0.8)
EOSINOPHIL NFR BLD: 5.1 % (ref 0.5–7.8)
ERYTHROCYTE [DISTWIDTH] IN BLOOD BY AUTOMATED COUNT: 13.9 % (ref 11.9–14.6)
EST. AVERAGE GLUCOSE BLD GHB EST-MCNC: 128 MG/DL
GLOBULIN SER CALC-MCNC: 2.8 G/DL (ref 2.3–3.5)
GLUCOSE SERPL-MCNC: 97 MG/DL (ref 70–99)
HBA1C MFR BLD: 6.1 % (ref 0–5.6)
HCT VFR BLD AUTO: 35 % (ref 41.1–50.3)
HGB BLD-MCNC: 11.4 G/DL (ref 13.6–17.2)
IMM GRANULOCYTES # BLD AUTO: 0.01 K/UL (ref 0–0.5)
IMM GRANULOCYTES NFR BLD AUTO: 0.2 % (ref 0–5)
LYMPHOCYTES # BLD: 1.21 K/UL (ref 0.5–4.6)
LYMPHOCYTES NFR BLD: 24.6 % (ref 13–44)
MCH RBC QN AUTO: 27.7 PG (ref 26.1–32.9)
MCHC RBC AUTO-ENTMCNC: 32.6 G/DL (ref 31.4–35)
MCV RBC AUTO: 85 FL (ref 82–102)
MONOCYTES # BLD: 0.5 K/UL (ref 0.1–1.3)
MONOCYTES NFR BLD: 10.2 % (ref 4–12)
NEUTS SEG # BLD: 2.91 K/UL (ref 1.7–8.2)
NEUTS SEG NFR BLD: 59.3 % (ref 43–78)
NRBC # BLD: 0 K/UL (ref 0–0.2)
PLATELET # BLD AUTO: 164 K/UL (ref 150–450)
PMV BLD AUTO: 11.3 FL (ref 9.4–12.3)
POTASSIUM SERPL-SCNC: 3.8 MMOL/L (ref 3.5–5.1)
PROT SERPL-MCNC: 6.4 G/DL (ref 6.3–8.2)
RBC # BLD AUTO: 4.12 M/UL (ref 4.23–5.6)
SODIUM SERPL-SCNC: 142 MMOL/L (ref 136–145)
TSH, 3RD GENERATION: 2.6 UIU/ML (ref 0.27–4.2)
VIT B12 SERPL-MCNC: 711 PG/ML (ref 193–986)
WBC # BLD AUTO: 4.9 K/UL (ref 4.3–11.1)

## 2025-01-15 RX ORDER — PANTOPRAZOLE SODIUM 40 MG/1
40 TABLET, DELAYED RELEASE ORAL 2 TIMES DAILY
COMMUNITY

## 2025-01-15 RX ORDER — PYRIDOXINE HCL (VITAMIN B6) 50 MG
TABLET ORAL DAILY
COMMUNITY

## 2025-01-15 RX ORDER — VITAMIN B COMPLEX
1 CAPSULE ORAL DAILY
COMMUNITY

## 2025-01-15 RX ORDER — ASCORBIC ACID 500 MG
500 TABLET ORAL DAILY
COMMUNITY

## 2025-01-15 SDOH — ECONOMIC STABILITY: FOOD INSECURITY: WITHIN THE PAST 12 MONTHS, THE FOOD YOU BOUGHT JUST DIDN'T LAST AND YOU DIDN'T HAVE MONEY TO GET MORE.: NEVER TRUE

## 2025-01-15 SDOH — ECONOMIC STABILITY: FOOD INSECURITY: WITHIN THE PAST 12 MONTHS, YOU WORRIED THAT YOUR FOOD WOULD RUN OUT BEFORE YOU GOT MONEY TO BUY MORE.: NEVER TRUE

## 2025-01-15 ASSESSMENT — PATIENT HEALTH QUESTIONNAIRE - PHQ9
SUM OF ALL RESPONSES TO PHQ QUESTIONS 1-9: 0
10. IF YOU CHECKED OFF ANY PROBLEMS, HOW DIFFICULT HAVE THESE PROBLEMS MADE IT FOR YOU TO DO YOUR WORK, TAKE CARE OF THINGS AT HOME, OR GET ALONG WITH OTHER PEOPLE: NOT DIFFICULT AT ALL
SUM OF ALL RESPONSES TO PHQ QUESTIONS 1-9: 0
7. TROUBLE CONCENTRATING ON THINGS, SUCH AS READING THE NEWSPAPER OR WATCHING TELEVISION: NOT AT ALL
SUM OF ALL RESPONSES TO PHQ QUESTIONS 1-9: 0
1. LITTLE INTEREST OR PLEASURE IN DOING THINGS: NOT AT ALL
4. FEELING TIRED OR HAVING LITTLE ENERGY: NOT AT ALL
2. FEELING DOWN, DEPRESSED OR HOPELESS: NOT AT ALL
3. TROUBLE FALLING OR STAYING ASLEEP: NOT AT ALL
9. THOUGHTS THAT YOU WOULD BE BETTER OFF DEAD, OR OF HURTING YOURSELF: NOT AT ALL
SUM OF ALL RESPONSES TO PHQ9 QUESTIONS 1 & 2: 0
8. MOVING OR SPEAKING SO SLOWLY THAT OTHER PEOPLE COULD HAVE NOTICED. OR THE OPPOSITE, BEING SO FIGETY OR RESTLESS THAT YOU HAVE BEEN MOVING AROUND A LOT MORE THAN USUAL: NOT AT ALL
6. FEELING BAD ABOUT YOURSELF - OR THAT YOU ARE A FAILURE OR HAVE LET YOURSELF OR YOUR FAMILY DOWN: NOT AT ALL
5. POOR APPETITE OR OVEREATING: NOT AT ALL
SUM OF ALL RESPONSES TO PHQ QUESTIONS 1-9: 0

## 2025-01-15 NOTE — PROGRESS NOTES
1/15/2025 10:36 AM  Location:[facility] PHYSICIAN SERVICES  [facility] [place]  [place]  Patient #:  [id number]  YOB: 1935          YOUR LAST HEMOGLOBIN A1CS:   No results found for: \"HBA1C\", \"[id number]\"    YOUR LAST LIPID PROFILE:   Lab Results   Component Value Date/Time    CHOL 137 07/12/2024 09:39 AM    HDL 60 07/12/2024 09:39 AM    LDL 68 07/12/2024 09:39 AM    LDL 35.2 07/06/2023 10:57 AM    VLDL 9 07/12/2024 09:39 AM    VLDL 12 12/28/2020 11:23 AM         Lab Results   Component Value Date/Time    GFRAA >60 06/23/2022 11:59 AM    BUN 8 07/12/2024 09:39 AM     07/12/2024 09:39 AM    K 3.9 07/12/2024 09:39 AM     07/12/2024 09:39 AM    CO2 30 07/12/2024 09:39 AM           History of Present Illness     Chief Complaint   Patient presents with    Medicare AW     Subsequent    6 Month Follow-Up     Pt presents to the office today for a 6 month follow-up     Weight Loss     Been losing weight     This patient is brought in by his son today.  His son Kimberly helps him with.  BUN is which she is not eating well.  He is continue to lose weight since his last visit.  Supplements have been obtained including protein shakes which the patient does not drink on a regular basis.  No falls have occurred..  Mr. Vasquez is a 89 y.o. 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

## 2025-01-15 NOTE — PATIENT INSTRUCTIONS
able to do ADLs. Let your doctor know if there are any tasks that you are having trouble doing. This is an important first step to getting help. And when you have the help you need, you can stay as independent as possible.  How will a doctor assess your ADLs?  Asking about ADLs is part of a routine health checkup your doctor will likely do as you age. Your health check might be done in a doctor's office, in your home, or at a hospital. The goal is to find out if you are having any problems that could make it hard to care for yourself or that make it unsafe for you to be on your own.  To measure your ADLs, your doctor will ask how hard it is for you to do routine tasks. Your doctor may also want to know if you have changed the way you do a task because of a health problem. Your doctor may watch how you:  Walk back and forth.  Keep your balance while you stand or walk.  Move from sitting to standing or from a bed to a chair.  Button or unbutton a shirt or sweater.  Remove and put on your shoes.  It's common to feel a little worried or anxious if you find you can't do all the things you used to be able to do. Talking with your doctor about ADLs is a way to make sure you're as safe as possible and able to care for yourself as well as you can. You may want to bring a caregiver, friend, or family member to your checkup. They can help you talk to your doctor.  Follow-up care is a key part of your treatment and safety. Be sure to make and go to all appointments, and call your doctor if you are having problems. It's also a good idea to know your test results and keep a list of the medicines you take.  Current as of: October 24, 2023  Content Version: 14.3  © 2024 [employer].   Care instructions adapted under license by [employer]. If you have questions about a medical condition or this instruction, always ask your healthcare professional. [employer] disclaims any warranty or liability for your use

## 2025-01-16 ENCOUNTER — TELEPHONE (OUTPATIENT)
Dept: INTERNAL MEDICINE CLINIC | Facility: CLINIC | Age: 89
End: 2025-01-16

## 2025-01-16 NOTE — TELEPHONE ENCOUNTER
----- Message from Dr. Matt Ovalle MD sent at 1/16/2025  7:10 AM EST -----  Your labs performed in the office were normal cms, continue same medications and diet

## 2025-04-11 ENCOUNTER — TELEPHONE (OUTPATIENT)
Dept: INTERNAL MEDICINE CLINIC | Facility: CLINIC | Age: 89
End: 2025-04-11

## 2025-06-25 DIAGNOSIS — N40.1 BENIGN PROSTATIC HYPERPLASIA WITH LOWER URINARY TRACT SYMPTOMS, SYMPTOM DETAILS UNSPECIFIED: ICD-10-CM

## 2025-06-25 RX ORDER — FINASTERIDE 5 MG/1
5 TABLET, FILM COATED ORAL DAILY
Qty: 90 TABLET | Refills: 3 | Status: SHIPPED | OUTPATIENT
Start: 2025-06-25

## 2025-06-25 RX ORDER — TAMSULOSIN HYDROCHLORIDE 0.4 MG/1
0.8 CAPSULE ORAL DAILY
Qty: 180 CAPSULE | Refills: 3 | Status: SHIPPED | OUTPATIENT
Start: 2025-06-25

## 2025-07-09 DIAGNOSIS — E11.69 TYPE 2 DIABETES MELLITUS WITH OTHER SPECIFIED COMPLICATION, WITHOUT LONG-TERM CURRENT USE OF INSULIN (HCC): ICD-10-CM

## 2025-07-09 RX ORDER — ATORVASTATIN CALCIUM 80 MG/1
80 TABLET, FILM COATED ORAL DAILY
Qty: 90 TABLET | Refills: 3 | Status: SHIPPED | OUTPATIENT
Start: 2025-07-09

## 2025-07-11 RX ORDER — ESCITALOPRAM OXALATE 10 MG/1
10 TABLET ORAL DAILY
Qty: 90 TABLET | Refills: 3 | Status: SHIPPED | OUTPATIENT
Start: 2025-07-11

## 2025-07-18 DIAGNOSIS — E11.69 TYPE 2 DIABETES MELLITUS WITH OTHER SPECIFIED COMPLICATION, WITHOUT LONG-TERM CURRENT USE OF INSULIN (HCC): ICD-10-CM

## 2025-08-01 ENCOUNTER — OFFICE VISIT (OUTPATIENT)
Dept: INTERNAL MEDICINE CLINIC | Facility: CLINIC | Age: 89
End: 2025-08-01
Payer: MEDICARE

## 2025-08-01 ENCOUNTER — TELEPHONE (OUTPATIENT)
Dept: INTERNAL MEDICINE CLINIC | Facility: CLINIC | Age: 89
End: 2025-08-01

## 2025-08-01 VITALS
WEIGHT: 161.8 LBS | HEIGHT: 74 IN | RESPIRATION RATE: 16 BRPM | SYSTOLIC BLOOD PRESSURE: 154 MMHG | BODY MASS INDEX: 20.76 KG/M2 | TEMPERATURE: 97.2 F | DIASTOLIC BLOOD PRESSURE: 84 MMHG | HEART RATE: 78 BPM

## 2025-08-01 DIAGNOSIS — E11.69 TYPE 2 DIABETES MELLITUS WITH OTHER SPECIFIED COMPLICATION, WITHOUT LONG-TERM CURRENT USE OF INSULIN (HCC): Primary | ICD-10-CM

## 2025-08-01 DIAGNOSIS — R68.89 OTHER GENERAL SYMPTOMS AND SIGNS: ICD-10-CM

## 2025-08-01 DIAGNOSIS — R53.82 CHRONIC FATIGUE: ICD-10-CM

## 2025-08-01 DIAGNOSIS — I10 ESSENTIAL HYPERTENSION: ICD-10-CM

## 2025-08-01 DIAGNOSIS — F32.1 MAJOR DEPRESSIVE DISORDER, SINGLE EPISODE, MODERATE (HCC): ICD-10-CM

## 2025-08-01 DIAGNOSIS — E11.69 TYPE 2 DIABETES MELLITUS WITH OTHER SPECIFIED COMPLICATION, WITHOUT LONG-TERM CURRENT USE OF INSULIN (HCC): ICD-10-CM

## 2025-08-01 LAB
ALBUMIN SERPL-MCNC: 3.6 G/DL (ref 3.2–4.6)
ALBUMIN/GLOB SERPL: 1 (ref 1–1.9)
ALP SERPL-CCNC: 53 U/L (ref 40–129)
ALT SERPL-CCNC: 53 U/L (ref 8–55)
ANION GAP SERPL CALC-SCNC: 10 MMOL/L (ref 7–16)
AST SERPL-CCNC: 40 U/L (ref 15–37)
BASOPHILS # BLD: 0.04 K/UL (ref 0–0.2)
BASOPHILS NFR BLD: 0.7 % (ref 0–2)
BILIRUB SERPL-MCNC: 0.2 MG/DL (ref 0–1.2)
BUN SERPL-MCNC: 33 MG/DL (ref 8–23)
CALCIUM SERPL-MCNC: 9.6 MG/DL (ref 8.8–10.2)
CHLORIDE SERPL-SCNC: 100 MMOL/L (ref 98–107)
CO2 SERPL-SCNC: 30 MMOL/L (ref 20–29)
CREAT SERPL-MCNC: 1 MG/DL (ref 0.8–1.3)
DIFFERENTIAL METHOD BLD: ABNORMAL
EOSINOPHIL # BLD: 0.27 K/UL (ref 0–0.8)
EOSINOPHIL NFR BLD: 4.5 % (ref 0.5–7.8)
ERYTHROCYTE [DISTWIDTH] IN BLOOD BY AUTOMATED COUNT: 12.9 % (ref 11.9–14.6)
EST. AVERAGE GLUCOSE BLD GHB EST-MCNC: 147 MG/DL
GLOBULIN SER CALC-MCNC: 3.5 G/DL (ref 2.3–3.5)
GLUCOSE SERPL-MCNC: 161 MG/DL (ref 70–99)
HBA1C MFR BLD: 6.7 % (ref 0–5.6)
HCT VFR BLD AUTO: 39 % (ref 41.1–50.3)
HGB BLD-MCNC: 12.8 G/DL (ref 13.6–17.2)
IMM GRANULOCYTES # BLD AUTO: 0.02 K/UL (ref 0–0.5)
IMM GRANULOCYTES NFR BLD AUTO: 0.3 % (ref 0–5)
LYMPHOCYTES # BLD: 1.23 K/UL (ref 0.5–4.6)
LYMPHOCYTES NFR BLD: 20.5 % (ref 13–44)
MCH RBC QN AUTO: 28.7 PG (ref 26.1–32.9)
MCHC RBC AUTO-ENTMCNC: 32.8 G/DL (ref 31.4–35)
MCV RBC AUTO: 87.4 FL (ref 82–102)
MONOCYTES # BLD: 0.45 K/UL (ref 0.1–1.3)
MONOCYTES NFR BLD: 7.5 % (ref 4–12)
NEUTS SEG # BLD: 3.99 K/UL (ref 1.7–8.2)
NEUTS SEG NFR BLD: 66.5 % (ref 43–78)
NRBC # BLD: 0 K/UL (ref 0–0.2)
PLATELET # BLD AUTO: 224 K/UL (ref 150–450)
PMV BLD AUTO: 10.9 FL (ref 9.4–12.3)
POTASSIUM SERPL-SCNC: 4.2 MMOL/L (ref 3.5–5.1)
PROT SERPL-MCNC: 7.1 G/DL (ref 6.3–8.2)
RBC # BLD AUTO: 4.46 M/UL (ref 4.23–5.6)
SODIUM SERPL-SCNC: 141 MMOL/L (ref 136–145)
TSH, 3RD GENERATION: 4.44 UIU/ML (ref 0.27–4.2)
VIT B12 SERPL-MCNC: 1472 PG/ML (ref 193–986)
WBC # BLD AUTO: 6 K/UL (ref 4.3–11.1)

## 2025-08-01 PROCEDURE — 1123F ACP DISCUSS/DSCN MKR DOCD: CPT | Performed by: INTERNAL MEDICINE

## 2025-08-01 PROCEDURE — G2211 COMPLEX E/M VISIT ADD ON: HCPCS | Performed by: INTERNAL MEDICINE

## 2025-08-01 PROCEDURE — 1159F MED LIST DOCD IN RCRD: CPT | Performed by: INTERNAL MEDICINE

## 2025-08-01 PROCEDURE — G8420 CALC BMI NORM PARAMETERS: HCPCS | Performed by: INTERNAL MEDICINE

## 2025-08-01 PROCEDURE — 1126F AMNT PAIN NOTED NONE PRSNT: CPT | Performed by: INTERNAL MEDICINE

## 2025-08-01 PROCEDURE — G8427 DOCREV CUR MEDS BY ELIG CLIN: HCPCS | Performed by: INTERNAL MEDICINE

## 2025-08-01 PROCEDURE — 99215 OFFICE O/P EST HI 40 MIN: CPT | Performed by: INTERNAL MEDICINE

## 2025-08-01 PROCEDURE — 3044F HG A1C LEVEL LT 7.0%: CPT | Performed by: INTERNAL MEDICINE

## 2025-08-01 PROCEDURE — 1036F TOBACCO NON-USER: CPT | Performed by: INTERNAL MEDICINE

## 2025-08-01 PROCEDURE — 1160F RVW MEDS BY RX/DR IN RCRD: CPT | Performed by: INTERNAL MEDICINE

## 2025-08-01 RX ORDER — AMOXICILLIN 500 MG
CAPSULE ORAL DAILY
COMMUNITY
End: 2025-08-01 | Stop reason: ALTCHOICE

## 2025-08-01 RX ORDER — AMLODIPINE AND BENAZEPRIL HYDROCHLORIDE 5; 10 MG/1; MG/1
1 CAPSULE ORAL DAILY
Qty: 90 CAPSULE | Refills: 3 | Status: SHIPPED | OUTPATIENT
Start: 2025-08-01

## 2025-08-01 RX ORDER — AMLODIPINE AND BENAZEPRIL HYDROCHLORIDE 10; 40 MG/1; MG/1
1 CAPSULE ORAL DAILY
Qty: 90 CAPSULE | Refills: 3 | Status: SHIPPED | OUTPATIENT
Start: 2025-08-01 | End: 2025-08-01

## 2025-08-01 RX ORDER — FAMOTIDINE 20 MG/1
20 TABLET, FILM COATED ORAL PRN
COMMUNITY
End: 2025-08-01 | Stop reason: ALTCHOICE

## 2025-08-01 RX ORDER — ESCITALOPRAM OXALATE 10 MG/1
10 TABLET ORAL DAILY
Qty: 90 TABLET | Refills: 3 | Status: SHIPPED | OUTPATIENT
Start: 2025-08-01 | End: 2025-08-01

## 2025-08-01 RX ORDER — FEXOFENADINE HCL 180 MG/1
180 TABLET ORAL DAILY
COMMUNITY

## 2025-08-01 RX ORDER — ESCITALOPRAM OXALATE 20 MG/1
20 TABLET ORAL DAILY
Qty: 90 TABLET | Refills: 3 | Status: SHIPPED | OUTPATIENT
Start: 2025-08-01

## 2025-08-01 NOTE — PROGRESS NOTES
8/1/2025 10:33 AM  Location:[facility] PHYSICIAN SERVICES  [facility] MEDICINE  [place]  Patient #:  [id number]  YOB: 1935      History of Present Illness  The patient presents for medication management, weight loss, cough, and confusion. He is accompanied by his son.  He has had more difficulty taking his medications properly.  Numerous medications have been building up in his medicine cabinet not taking.  He has however been taking his metformin according to his son.  He has numerous supplements of vitamins as well.    Medication Management  The patient's son assists with medication management, ensuring correct prescriptions from his pill box. He has been inconsistent with medication intake, often missing doses or refills. Current regimen: metformin, tamsulosin, finasteride, allergy pill, atorvastatin, low-dose aspirin daily. Lexapro needs refill. No stomach issues, takes famotidine.  - Character: Inconsistent medication intake, often missing doses or refills.  - Alleviating Factors: son assists with medication management.    Cough  Coughing for 3 weeks, no blood in phlegm, no fevers. Uses Mucinex, cough syrup, and cough drops. Takes allergy medication regularly.  - Onset: Coughing for 3 weeks.  - Character: No blood in phlegm, no fevers.  - Alleviating Factors: Uses Mucinex, cough syrup, and cough drops; takes allergy medication regularly.    Confusion  Experiencing confusion, particularly with the day of the week and medication schedule.  eats only if food is placed in front of him. son stays from Tuesday to Saturday, prepares meals due to diabetes, provides two protein drinks daily (50 mg protein each). Another family member visits Sundays and Mondays to ensure he eats.  - Character: Confusion with the day of the week and medication schedule;  ; eats only if food is placed in front of him.  - Alleviating Factors: son prepares meals and provides protein drinks; another family member

## 2025-08-01 NOTE — TELEPHONE ENCOUNTER
Pts son states the Lexapro was to be increased from 10 mg to 20 mg. The pharmacy received 10 mg.     Would like a call back when this is taken care of.

## 2025-08-05 ENCOUNTER — TELEPHONE (OUTPATIENT)
Dept: INTERNAL MEDICINE CLINIC | Facility: CLINIC | Age: 89
End: 2025-08-05